# Patient Record
Sex: MALE | Race: WHITE | Employment: OTHER | URBAN - METROPOLITAN AREA
[De-identification: names, ages, dates, MRNs, and addresses within clinical notes are randomized per-mention and may not be internally consistent; named-entity substitution may affect disease eponyms.]

---

## 2019-01-01 ENCOUNTER — HOSPITAL ENCOUNTER (OUTPATIENT)
Dept: PHYSICAL THERAPY | Age: 74
Discharge: HOME | End: 2019-12-17
Attending: PHYSICAL MEDICINE & REHABILITATION
Payer: MEDICARE

## 2019-01-01 ENCOUNTER — APPOINTMENT (OUTPATIENT)
Dept: GENERAL RADIOLOGY | Age: 74
DRG: 057 | End: 2019-01-01
Attending: EMERGENCY MEDICINE
Payer: MEDICARE

## 2019-01-01 ENCOUNTER — HOME CARE VISIT (OUTPATIENT)
Dept: SCHEDULING | Facility: HOME HEALTH | Age: 74
End: 2019-01-01
Payer: MEDICARE

## 2019-01-01 ENCOUNTER — HOME HEALTH ADMISSION (OUTPATIENT)
Dept: HOME HEALTH SERVICES | Facility: HOME HEALTH | Age: 74
End: 2019-01-01
Payer: MEDICARE

## 2019-01-01 ENCOUNTER — APPOINTMENT (OUTPATIENT)
Dept: CT IMAGING | Age: 74
DRG: 057 | End: 2019-01-01
Attending: FAMILY MEDICINE
Payer: MEDICARE

## 2019-01-01 ENCOUNTER — APPOINTMENT (OUTPATIENT)
Dept: GENERAL RADIOLOGY | Age: 74
DRG: 057 | End: 2019-01-01
Attending: FAMILY MEDICINE
Payer: MEDICARE

## 2019-01-01 ENCOUNTER — HOME CARE VISIT (OUTPATIENT)
Dept: HOME HEALTH SERVICES | Facility: HOME HEALTH | Age: 74
End: 2019-01-01
Payer: MEDICARE

## 2019-01-01 ENCOUNTER — HOSPITAL ENCOUNTER (OUTPATIENT)
Dept: PHYSICAL THERAPY | Age: 74
Discharge: HOME | End: 2019-12-06
Attending: PHYSICAL MEDICINE & REHABILITATION
Payer: MEDICARE

## 2019-01-01 ENCOUNTER — HOSPITAL ENCOUNTER (OUTPATIENT)
Dept: PHYSICAL THERAPY | Age: 74
Discharge: HOME | End: 2019-12-12
Attending: PHYSICAL MEDICINE & REHABILITATION
Payer: MEDICARE

## 2019-01-01 ENCOUNTER — HOSPITAL ENCOUNTER (OUTPATIENT)
Dept: LAB | Age: 74
Discharge: HOME | End: 2019-10-24
Payer: MEDICARE

## 2019-01-01 ENCOUNTER — APPOINTMENT (OUTPATIENT)
Dept: CT IMAGING | Age: 74
DRG: 057 | End: 2019-01-01
Attending: EMERGENCY MEDICINE
Payer: MEDICARE

## 2019-01-01 ENCOUNTER — HOSPITAL ENCOUNTER (OUTPATIENT)
Dept: PHYSICAL THERAPY | Age: 74
Discharge: HOME | End: 2019-12-10
Attending: PHYSICAL MEDICINE & REHABILITATION
Payer: MEDICARE

## 2019-01-01 ENCOUNTER — HOSPITAL ENCOUNTER (INPATIENT)
Age: 74
LOS: 2 days | Discharge: NURSING, REHAB OR LONG-TERM CARE | DRG: 057 | End: 2019-09-30
Attending: EMERGENCY MEDICINE | Admitting: FAMILY MEDICINE
Payer: MEDICARE

## 2019-01-01 ENCOUNTER — HOSPITAL ENCOUNTER (OUTPATIENT)
Dept: PHYSICAL THERAPY | Age: 74
Discharge: HOME | End: 2019-12-23
Attending: PHYSICAL MEDICINE & REHABILITATION
Payer: MEDICARE

## 2019-01-01 ENCOUNTER — HOSPITAL ENCOUNTER (OUTPATIENT)
Dept: PHYSICAL THERAPY | Age: 74
Discharge: HOME | End: 2019-12-20
Attending: PHYSICAL MEDICINE & REHABILITATION
Payer: MEDICARE

## 2019-01-01 ENCOUNTER — HOSPITAL ENCOUNTER (INPATIENT)
Age: 74
LOS: 15 days | Discharge: HOME | DRG: 948 | End: 2019-10-15
Attending: PHYSICAL MEDICINE & REHABILITATION | Admitting: PHYSICAL MEDICINE & REHABILITATION
Payer: MEDICARE

## 2019-01-01 ENCOUNTER — APPOINTMENT (OUTPATIENT)
Dept: MRI IMAGING | Age: 74
DRG: 057 | End: 2019-01-01
Attending: FAMILY MEDICINE
Payer: MEDICARE

## 2019-01-01 ENCOUNTER — HOSPITAL ENCOUNTER (OUTPATIENT)
Dept: LAB | Age: 74
Discharge: HOME | End: 2019-11-21
Payer: MEDICARE

## 2019-01-01 VITALS
HEART RATE: 78 BPM | TEMPERATURE: 98.6 F | DIASTOLIC BLOOD PRESSURE: 78 MMHG | RESPIRATION RATE: 16 BRPM | SYSTOLIC BLOOD PRESSURE: 150 MMHG

## 2019-01-01 VITALS
SYSTOLIC BLOOD PRESSURE: 138 MMHG | TEMPERATURE: 98.9 F | RESPIRATION RATE: 18 BRPM | DIASTOLIC BLOOD PRESSURE: 70 MMHG | HEART RATE: 60 BPM | OXYGEN SATURATION: 94 %

## 2019-01-01 VITALS
DIASTOLIC BLOOD PRESSURE: 72 MMHG | RESPIRATION RATE: 18 BRPM | SYSTOLIC BLOOD PRESSURE: 142 MMHG | HEART RATE: 78 BPM | TEMPERATURE: 97.3 F

## 2019-01-01 VITALS
BODY MASS INDEX: 26.3 KG/M2 | RESPIRATION RATE: 18 BRPM | OXYGEN SATURATION: 93 % | HEART RATE: 70 BPM | DIASTOLIC BLOOD PRESSURE: 80 MMHG | HEIGHT: 70 IN | SYSTOLIC BLOOD PRESSURE: 160 MMHG | WEIGHT: 183.7 LBS | TEMPERATURE: 97.7 F

## 2019-01-01 VITALS
RESPIRATION RATE: 14 BRPM | DIASTOLIC BLOOD PRESSURE: 70 MMHG | HEART RATE: 61 BPM | TEMPERATURE: 98.2 F | SYSTOLIC BLOOD PRESSURE: 121 MMHG | OXYGEN SATURATION: 98 %

## 2019-01-01 VITALS
DIASTOLIC BLOOD PRESSURE: 80 MMHG | SYSTOLIC BLOOD PRESSURE: 144 MMHG | RESPIRATION RATE: 17 BRPM | TEMPERATURE: 99 F | HEART RATE: 66 BPM

## 2019-01-01 VITALS
DIASTOLIC BLOOD PRESSURE: 80 MMHG | HEART RATE: 78 BPM | TEMPERATURE: 97.8 F | SYSTOLIC BLOOD PRESSURE: 142 MMHG | RESPIRATION RATE: 18 BRPM

## 2019-01-01 VITALS
DIASTOLIC BLOOD PRESSURE: 85 MMHG | HEART RATE: 76 BPM | SYSTOLIC BLOOD PRESSURE: 140 MMHG | TEMPERATURE: 99 F | OXYGEN SATURATION: 97 %

## 2019-01-01 VITALS
SYSTOLIC BLOOD PRESSURE: 132 MMHG | RESPIRATION RATE: 16 BRPM | TEMPERATURE: 98.3 F | HEART RATE: 78 BPM | DIASTOLIC BLOOD PRESSURE: 74 MMHG

## 2019-01-01 VITALS
TEMPERATURE: 98.5 F | HEART RATE: 60 BPM | RESPIRATION RATE: 16 BRPM | SYSTOLIC BLOOD PRESSURE: 114 MMHG | DIASTOLIC BLOOD PRESSURE: 72 MMHG

## 2019-01-01 VITALS
OXYGEN SATURATION: 96 % | RESPIRATION RATE: 16 BRPM | DIASTOLIC BLOOD PRESSURE: 82 MMHG | TEMPERATURE: 98.4 F | HEART RATE: 82 BPM | SYSTOLIC BLOOD PRESSURE: 113 MMHG

## 2019-01-01 VITALS
SYSTOLIC BLOOD PRESSURE: 146 MMHG | OXYGEN SATURATION: 99 % | RESPIRATION RATE: 20 BRPM | HEART RATE: 74 BPM | DIASTOLIC BLOOD PRESSURE: 64 MMHG

## 2019-01-01 VITALS
RESPIRATION RATE: 16 BRPM | TEMPERATURE: 97.8 F | DIASTOLIC BLOOD PRESSURE: 72 MMHG | SYSTOLIC BLOOD PRESSURE: 128 MMHG | HEART RATE: 78 BPM

## 2019-01-01 VITALS
TEMPERATURE: 98.2 F | HEART RATE: 78 BPM | OXYGEN SATURATION: 96 % | SYSTOLIC BLOOD PRESSURE: 158 MMHG | DIASTOLIC BLOOD PRESSURE: 82 MMHG | RESPIRATION RATE: 18 BRPM

## 2019-01-01 VITALS
HEART RATE: 60 BPM | SYSTOLIC BLOOD PRESSURE: 125 MMHG | TEMPERATURE: 98.6 F | RESPIRATION RATE: 16 BRPM | DIASTOLIC BLOOD PRESSURE: 70 MMHG | OXYGEN SATURATION: 99 %

## 2019-01-01 VITALS
DIASTOLIC BLOOD PRESSURE: 72 MMHG | RESPIRATION RATE: 18 BRPM | HEART RATE: 62 BPM | SYSTOLIC BLOOD PRESSURE: 122 MMHG | TEMPERATURE: 98.2 F

## 2019-01-01 VITALS
HEART RATE: 63 BPM | SYSTOLIC BLOOD PRESSURE: 167 MMHG | RESPIRATION RATE: 16 BRPM | TEMPERATURE: 98.2 F | DIASTOLIC BLOOD PRESSURE: 75 MMHG

## 2019-01-01 VITALS
HEART RATE: 64 BPM | RESPIRATION RATE: 14 BRPM | SYSTOLIC BLOOD PRESSURE: 142 MMHG | TEMPERATURE: 98.1 F | DIASTOLIC BLOOD PRESSURE: 64 MMHG

## 2019-01-01 VITALS
DIASTOLIC BLOOD PRESSURE: 80 MMHG | DIASTOLIC BLOOD PRESSURE: 72 MMHG | TEMPERATURE: 98.2 F | TEMPERATURE: 98.2 F | SYSTOLIC BLOOD PRESSURE: 158 MMHG | RESPIRATION RATE: 18 BRPM | HEART RATE: 78 BPM | RESPIRATION RATE: 18 BRPM | SYSTOLIC BLOOD PRESSURE: 124 MMHG | HEART RATE: 76 BPM

## 2019-01-01 VITALS
TEMPERATURE: 97.8 F | SYSTOLIC BLOOD PRESSURE: 134 MMHG | HEART RATE: 72 BPM | DIASTOLIC BLOOD PRESSURE: 72 MMHG | RESPIRATION RATE: 18 BRPM

## 2019-01-01 VITALS
RESPIRATION RATE: 18 BRPM | HEART RATE: 80 BPM | TEMPERATURE: 97.7 F | DIASTOLIC BLOOD PRESSURE: 80 MMHG | SYSTOLIC BLOOD PRESSURE: 140 MMHG

## 2019-01-01 VITALS
HEART RATE: 77 BPM | SYSTOLIC BLOOD PRESSURE: 110 MMHG | RESPIRATION RATE: 19 BRPM | DIASTOLIC BLOOD PRESSURE: 66 MMHG | TEMPERATURE: 97.9 F

## 2019-01-01 VITALS
TEMPERATURE: 98 F | DIASTOLIC BLOOD PRESSURE: 68 MMHG | SYSTOLIC BLOOD PRESSURE: 156 MMHG | HEART RATE: 64 BPM | RESPIRATION RATE: 16 BRPM

## 2019-01-01 VITALS
HEART RATE: 78 BPM | DIASTOLIC BLOOD PRESSURE: 76 MMHG | RESPIRATION RATE: 18 BRPM | TEMPERATURE: 97.1 F | SYSTOLIC BLOOD PRESSURE: 142 MMHG

## 2019-01-01 VITALS
RESPIRATION RATE: 18 BRPM | DIASTOLIC BLOOD PRESSURE: 72 MMHG | SYSTOLIC BLOOD PRESSURE: 134 MMHG | TEMPERATURE: 97.8 F | HEART RATE: 78 BPM

## 2019-01-01 VITALS
RESPIRATION RATE: 16 BRPM | OXYGEN SATURATION: 98 % | TEMPERATURE: 97.8 F | DIASTOLIC BLOOD PRESSURE: 70 MMHG | SYSTOLIC BLOOD PRESSURE: 150 MMHG | HEART RATE: 62 BPM

## 2019-01-01 VITALS
RESPIRATION RATE: 16 BRPM | TEMPERATURE: 97.9 F | HEART RATE: 80 BPM | DIASTOLIC BLOOD PRESSURE: 84 MMHG | SYSTOLIC BLOOD PRESSURE: 148 MMHG | OXYGEN SATURATION: 98 %

## 2019-01-01 VITALS
HEART RATE: 74 BPM | SYSTOLIC BLOOD PRESSURE: 120 MMHG | RESPIRATION RATE: 18 BRPM | DIASTOLIC BLOOD PRESSURE: 70 MMHG | TEMPERATURE: 98.7 F

## 2019-01-01 VITALS
DIASTOLIC BLOOD PRESSURE: 61 MMHG | HEART RATE: 60 BPM | RESPIRATION RATE: 18 BRPM | TEMPERATURE: 98.6 F | SYSTOLIC BLOOD PRESSURE: 135 MMHG

## 2019-01-01 VITALS
OXYGEN SATURATION: 98 % | SYSTOLIC BLOOD PRESSURE: 150 MMHG | HEART RATE: 64 BPM | TEMPERATURE: 98.7 F | DIASTOLIC BLOOD PRESSURE: 70 MMHG

## 2019-01-01 VITALS
HEART RATE: 62 BPM | TEMPERATURE: 97.8 F | DIASTOLIC BLOOD PRESSURE: 70 MMHG | SYSTOLIC BLOOD PRESSURE: 148 MMHG | RESPIRATION RATE: 16 BRPM

## 2019-01-01 VITALS
RESPIRATION RATE: 18 BRPM | TEMPERATURE: 98.9 F | DIASTOLIC BLOOD PRESSURE: 78 MMHG | HEART RATE: 56 BPM | SYSTOLIC BLOOD PRESSURE: 150 MMHG

## 2019-01-01 VITALS
TEMPERATURE: 97.4 F | RESPIRATION RATE: 18 BRPM | DIASTOLIC BLOOD PRESSURE: 78 MMHG | OXYGEN SATURATION: 98 % | HEART RATE: 78 BPM | SYSTOLIC BLOOD PRESSURE: 138 MMHG

## 2019-01-01 VITALS
RESPIRATION RATE: 18 BRPM | DIASTOLIC BLOOD PRESSURE: 82 MMHG | TEMPERATURE: 98.7 F | HEART RATE: 60 BPM | OXYGEN SATURATION: 98 % | SYSTOLIC BLOOD PRESSURE: 158 MMHG

## 2019-01-01 VITALS
DIASTOLIC BLOOD PRESSURE: 78 MMHG | SYSTOLIC BLOOD PRESSURE: 182 MMHG | TEMPERATURE: 98.2 F | HEART RATE: 78 BPM | RESPIRATION RATE: 18 BRPM

## 2019-01-01 VITALS — TEMPERATURE: 98.2 F | RESPIRATION RATE: 18 BRPM | HEART RATE: 78 BPM

## 2019-01-01 DIAGNOSIS — G93.40 ENCEPHALOPATHY: ICD-10-CM

## 2019-01-01 DIAGNOSIS — R56.9 SEIZURES (HCC): ICD-10-CM

## 2019-01-01 DIAGNOSIS — R53.1 WEAKNESS: Primary | ICD-10-CM

## 2019-01-01 DIAGNOSIS — Z85.46 HISTORY OF PROSTATE CANCER: ICD-10-CM

## 2019-01-01 DIAGNOSIS — R56.9 SEIZURE (HCC): ICD-10-CM

## 2019-01-01 DIAGNOSIS — G45.9 TIA (TRANSIENT ISCHEMIC ATTACK): ICD-10-CM

## 2019-01-01 DIAGNOSIS — I25.10 CORONARY ARTERY DISEASE INVOLVING NATIVE CORONARY ARTERY OF NATIVE HEART, ANGINA PRESENCE UNSPECIFIED: ICD-10-CM

## 2019-01-01 DIAGNOSIS — G40.901 STATUS EPILEPTICUS (HCC): ICD-10-CM

## 2019-01-01 DIAGNOSIS — G40.909 SEIZURE DISORDER (HCC): ICD-10-CM

## 2019-01-01 DIAGNOSIS — Z86.73 HISTORY OF CVA (CEREBROVASCULAR ACCIDENT): ICD-10-CM

## 2019-01-01 DIAGNOSIS — Z90.79 HISTORY OF PROSTATECTOMY: ICD-10-CM

## 2019-01-01 LAB
ALBUMIN SERPL-MCNC: 3.6 G/DL (ref 3.2–4.6)
ALBUMIN/GLOB SERPL: 0.9 {RATIO} (ref 1.2–3.5)
ALP SERPL-CCNC: 126 U/L (ref 50–136)
ALT SERPL-CCNC: 16 U/L (ref 12–65)
ANION GAP SERPL CALC-SCNC: 1 MMOL/L (ref 7–16)
ANION GAP SERPL CALC-SCNC: 4 MMOL/L (ref 7–16)
ANION GAP SERPL CALC-SCNC: 6 MMOL/L (ref 7–16)
ANION GAP SERPL CALC-SCNC: 7 MMOL/L (ref 7–16)
ANION GAP SERPL CALC-SCNC: 8 MMOL/L (ref 7–16)
APPEARANCE UR: ABNORMAL
AST SERPL-CCNC: 19 U/L (ref 15–37)
BACTERIA SPEC CULT: ABNORMAL
BACTERIA SPEC CULT: ABNORMAL
BACTERIA SPEC CULT: NORMAL
BACTERIA SPEC CULT: NORMAL
BACTERIA URNS QL MICRO: 0 /HPF
BASOPHILS # BLD: 0 K/UL (ref 0–0.2)
BASOPHILS # BLD: 0 K/UL (ref 0–0.2)
BASOPHILS NFR BLD: 0 % (ref 0–2)
BASOPHILS NFR BLD: 0 % (ref 0–2)
BILIRUB SERPL-MCNC: 0.5 MG/DL (ref 0.2–1.1)
BILIRUB UR QL: NEGATIVE
BUN SERPL-MCNC: 16 MG/DL (ref 8–23)
BUN SERPL-MCNC: 17 MG/DL (ref 8–23)
BUN SERPL-MCNC: 19 MG/DL (ref 8–23)
BUN SERPL-MCNC: 20 MG/DL (ref 8–23)
BUN SERPL-MCNC: 23 MG/DL (ref 8–23)
CALCIUM SERPL-MCNC: 9 MG/DL (ref 8.3–10.4)
CALCIUM SERPL-MCNC: 9.1 MG/DL (ref 8.3–10.4)
CALCIUM SERPL-MCNC: 9.2 MG/DL (ref 8.3–10.4)
CALCIUM SERPL-MCNC: 9.2 MG/DL (ref 8.3–10.4)
CALCIUM SERPL-MCNC: 9.3 MG/DL (ref 8.3–10.4)
CALCIUM SERPL-MCNC: 9.5 MG/DL (ref 8.3–10.4)
CALCIUM SERPL-MCNC: 9.5 MG/DL (ref 8.3–10.4)
CASTS URNS QL MICRO: 0 /LPF
CHLORIDE SERPL-SCNC: 103 MMOL/L (ref 98–107)
CHLORIDE SERPL-SCNC: 103 MMOL/L (ref 98–107)
CHLORIDE SERPL-SCNC: 104 MMOL/L (ref 98–107)
CHLORIDE SERPL-SCNC: 106 MMOL/L (ref 98–107)
CHLORIDE SERPL-SCNC: 107 MMOL/L (ref 98–107)
CHLORIDE SERPL-SCNC: 108 MMOL/L (ref 98–107)
CHLORIDE SERPL-SCNC: 111 MMOL/L (ref 98–107)
CHOLEST SERPL-MCNC: 152 MG/DL
CO2 SERPL-SCNC: 30 MMOL/L (ref 21–32)
CO2 SERPL-SCNC: 30 MMOL/L (ref 21–32)
CO2 SERPL-SCNC: 31 MMOL/L (ref 21–32)
CO2 SERPL-SCNC: 31 MMOL/L (ref 21–32)
CO2 SERPL-SCNC: 32 MMOL/L (ref 21–32)
CO2 SERPL-SCNC: 33 MMOL/L (ref 21–32)
CO2 SERPL-SCNC: 33 MMOL/L (ref 21–32)
COLOR UR: YELLOW
CREAT SERPL-MCNC: 0.9 MG/DL (ref 0.8–1.5)
CREAT SERPL-MCNC: 0.91 MG/DL (ref 0.8–1.5)
CREAT SERPL-MCNC: 0.92 MG/DL (ref 0.8–1.5)
CREAT SERPL-MCNC: 0.92 MG/DL (ref 0.8–1.5)
CREAT SERPL-MCNC: 0.93 MG/DL (ref 0.8–1.5)
CREAT SERPL-MCNC: 0.99 MG/DL (ref 0.8–1.5)
CREAT SERPL-MCNC: 1.11 MG/DL (ref 0.8–1.5)
DIFFERENTIAL METHOD BLD: ABNORMAL
DIFFERENTIAL METHOD BLD: ABNORMAL
EOSINOPHIL # BLD: 0 K/UL (ref 0–0.8)
EOSINOPHIL # BLD: 0.1 K/UL (ref 0–0.8)
EOSINOPHIL NFR BLD: 0 % (ref 0.5–7.8)
EOSINOPHIL NFR BLD: 1 % (ref 0.5–7.8)
EPI CELLS #/AREA URNS HPF: ABNORMAL /HPF
ERYTHROCYTE [DISTWIDTH] IN BLOOD BY AUTOMATED COUNT: 14.6 % (ref 11.9–14.6)
ERYTHROCYTE [DISTWIDTH] IN BLOOD BY AUTOMATED COUNT: 14.7 % (ref 11.9–14.6)
ERYTHROCYTE [DISTWIDTH] IN BLOOD BY AUTOMATED COUNT: 14.9 % (ref 11.9–14.6)
ERYTHROCYTE [DISTWIDTH] IN BLOOD BY AUTOMATED COUNT: 15.1 % (ref 11.9–14.6)
ERYTHROCYTE [DISTWIDTH] IN BLOOD BY AUTOMATED COUNT: 15.1 % (ref 11.9–14.6)
EST. AVERAGE GLUCOSE BLD GHB EST-MCNC: 111 MG/DL
GLOBULIN SER CALC-MCNC: 4.1 G/DL (ref 2.3–3.5)
GLUCOSE BLD STRIP.AUTO-MCNC: 88 MG/DL (ref 65–100)
GLUCOSE SERPL-MCNC: 101 MG/DL (ref 65–100)
GLUCOSE SERPL-MCNC: 106 MG/DL (ref 65–100)
GLUCOSE SERPL-MCNC: 111 MG/DL (ref 65–100)
GLUCOSE SERPL-MCNC: 90 MG/DL (ref 65–100)
GLUCOSE SERPL-MCNC: 92 MG/DL (ref 65–100)
GLUCOSE SERPL-MCNC: 93 MG/DL (ref 65–100)
GLUCOSE SERPL-MCNC: 95 MG/DL (ref 65–100)
GLUCOSE UR STRIP.AUTO-MCNC: NEGATIVE MG/DL
HBA1C MFR BLD: 5.5 %
HCT VFR BLD AUTO: 37.1 % (ref 41.1–50.3)
HCT VFR BLD AUTO: 37.2 % (ref 41.1–50.3)
HCT VFR BLD AUTO: 37.7 % (ref 41.1–50.3)
HCT VFR BLD AUTO: 38.8 % (ref 41.1–50.3)
HCT VFR BLD AUTO: 39.4 % (ref 41.1–50.3)
HCT VFR BLD AUTO: 40.3 % (ref 41.1–50.3)
HCT VFR BLD AUTO: 40.7 % (ref 41.1–50.3)
HCT VFR BLD AUTO: 45.5 % (ref 41.1–50.3)
HDLC SERPL-MCNC: 44 MG/DL (ref 40–60)
HDLC SERPL: 3.5 {RATIO}
HGB BLD-MCNC: 11.3 G/DL (ref 13.6–17.2)
HGB BLD-MCNC: 11.7 G/DL (ref 13.6–17.2)
HGB BLD-MCNC: 11.8 G/DL (ref 13.6–17.2)
HGB BLD-MCNC: 12.3 G/DL (ref 13.6–17.2)
HGB BLD-MCNC: 12.4 G/DL (ref 13.6–17.2)
HGB BLD-MCNC: 12.6 G/DL (ref 13.6–17.2)
HGB BLD-MCNC: 12.7 G/DL (ref 13.6–17.2)
HGB BLD-MCNC: 14.2 G/DL (ref 13.6–17.2)
HGB UR QL STRIP: NEGATIVE
IMM GRANULOCYTES # BLD AUTO: 0 K/UL (ref 0–0.5)
IMM GRANULOCYTES # BLD AUTO: 0 K/UL (ref 0–0.5)
IMM GRANULOCYTES NFR BLD AUTO: 0 % (ref 0–5)
IMM GRANULOCYTES NFR BLD AUTO: 1 % (ref 0–5)
INR BLD: 1.2 (ref 0.9–1.2)
KETONES UR QL STRIP.AUTO: NEGATIVE MG/DL
LACTATE BLD-SCNC: 0.72 MMOL/L (ref 0.5–1.9)
LDLC SERPL CALC-MCNC: 91.2 MG/DL
LEUKOCYTE ESTERASE UR QL STRIP.AUTO: ABNORMAL
LIPID PROFILE,FLP: NORMAL
LYMPHOCYTES # BLD: 0.5 K/UL (ref 0.5–4.6)
LYMPHOCYTES # BLD: 0.6 K/UL (ref 0.5–4.6)
LYMPHOCYTES NFR BLD: 10 % (ref 13–44)
LYMPHOCYTES NFR BLD: 7 % (ref 13–44)
MAGNESIUM SERPL-MCNC: 2.1 MG/DL (ref 1.8–2.4)
MAGNESIUM SERPL-MCNC: 2.3 MG/DL (ref 1.8–2.4)
MAGNESIUM SERPL-MCNC: 2.4 MG/DL (ref 1.8–2.4)
MCH RBC QN AUTO: 26 PG (ref 26.1–32.9)
MCH RBC QN AUTO: 26.4 PG (ref 26.1–32.9)
MCH RBC QN AUTO: 26.5 PG (ref 26.1–32.9)
MCH RBC QN AUTO: 26.7 PG (ref 26.1–32.9)
MCH RBC QN AUTO: 26.8 PG (ref 26.1–32.9)
MCH RBC QN AUTO: 26.9 PG (ref 26.1–32.9)
MCH RBC QN AUTO: 27.3 PG (ref 26.1–32.9)
MCH RBC QN AUTO: 27.8 PG (ref 26.1–32.9)
MCHC RBC AUTO-ENTMCNC: 30.5 G/DL (ref 31.4–35)
MCHC RBC AUTO-ENTMCNC: 31.2 G/DL (ref 31.4–35)
MCHC RBC AUTO-ENTMCNC: 31.2 G/DL (ref 31.4–35)
MCHC RBC AUTO-ENTMCNC: 31.3 G/DL (ref 31.4–35)
MCHC RBC AUTO-ENTMCNC: 31.3 G/DL (ref 31.4–35)
MCHC RBC AUTO-ENTMCNC: 31.5 G/DL (ref 31.4–35)
MCHC RBC AUTO-ENTMCNC: 31.5 G/DL (ref 31.4–35)
MCHC RBC AUTO-ENTMCNC: 31.7 G/DL (ref 31.4–35)
MCV RBC AUTO: 83.4 FL (ref 79.6–97.8)
MCV RBC AUTO: 83.8 FL (ref 79.6–97.8)
MCV RBC AUTO: 84.7 FL (ref 79.6–97.8)
MCV RBC AUTO: 85.5 FL (ref 79.6–97.8)
MCV RBC AUTO: 85.6 FL (ref 79.6–97.8)
MCV RBC AUTO: 85.9 FL (ref 79.6–97.8)
MCV RBC AUTO: 87.5 FL (ref 79.6–97.8)
MCV RBC AUTO: 89 FL (ref 79.6–97.8)
MM INDURATION POC: 0 MM (ref 0–5)
MONOCYTES # BLD: 0.3 K/UL (ref 0.1–1.3)
MONOCYTES # BLD: 0.5 K/UL (ref 0.1–1.3)
MONOCYTES NFR BLD: 5 % (ref 4–12)
MONOCYTES NFR BLD: 7 % (ref 4–12)
NEUTS SEG # BLD: 5.2 K/UL (ref 1.7–8.2)
NEUTS SEG # BLD: 5.5 K/UL (ref 1.7–8.2)
NEUTS SEG NFR BLD: 81 % (ref 43–78)
NEUTS SEG NFR BLD: 88 % (ref 43–78)
NITRITE UR QL STRIP.AUTO: NEGATIVE
NRBC # BLD: 0 K/UL (ref 0–0.2)
PH UR STRIP: 6 [PH] (ref 5–9)
PHENYTOIN FREE SERPL-MCNC: ABNORMAL UG/ML (ref 1–2)
PHENYTOIN SERPL-MCNC: 5.3 UG/ML (ref 10–20)
PHENYTOIN SERPL-MCNC: 5.9 UG/ML (ref 10–20)
PHENYTOIN SERPL-MCNC: 6.9 UG/ML (ref 10–20)
PLATELET # BLD AUTO: 153 K/UL (ref 150–450)
PLATELET # BLD AUTO: 156 K/UL (ref 150–450)
PLATELET # BLD AUTO: 168 K/UL (ref 150–450)
PLATELET # BLD AUTO: 177 K/UL (ref 150–450)
PLATELET # BLD AUTO: 185 K/UL (ref 150–450)
PLATELET # BLD AUTO: 190 K/UL (ref 150–450)
PLATELET # BLD AUTO: 193 K/UL (ref 150–450)
PLATELET # BLD AUTO: 308 K/UL (ref 150–450)
PMV BLD AUTO: 9 FL (ref 9.4–12.3)
PMV BLD AUTO: 9.3 FL (ref 9.4–12.3)
PMV BLD AUTO: 9.5 FL (ref 9.4–12.3)
POTASSIUM SERPL-SCNC: 3.3 MMOL/L (ref 3.5–5.1)
POTASSIUM SERPL-SCNC: 3.4 MMOL/L (ref 3.5–5.1)
POTASSIUM SERPL-SCNC: 3.6 MMOL/L (ref 3.5–5.1)
POTASSIUM SERPL-SCNC: 3.6 MMOL/L (ref 3.5–5.1)
POTASSIUM SERPL-SCNC: 3.7 MMOL/L (ref 3.5–5.1)
POTASSIUM SERPL-SCNC: 3.8 MMOL/L (ref 3.5–5.1)
POTASSIUM SERPL-SCNC: 4 MMOL/L (ref 3.5–5.1)
PPD POC: NEGATIVE NEGATIVE
PROT SERPL-MCNC: 7.7 G/DL (ref 6.3–8.2)
PROT UR STRIP-MCNC: NEGATIVE MG/DL
PT BLD: 13.9 SECS (ref 9.6–11.6)
RBC # BLD AUTO: 4.34 M/UL (ref 4.23–5.6)
RBC # BLD AUTO: 4.39 M/UL (ref 4.23–5.6)
RBC # BLD AUTO: 4.39 M/UL (ref 4.23–5.6)
RBC # BLD AUTO: 4.65 M/UL (ref 4.23–5.6)
RBC # BLD AUTO: 4.65 M/UL (ref 4.23–5.6)
RBC # BLD AUTO: 4.7 M/UL (ref 4.23–5.6)
RBC # BLD AUTO: 4.71 M/UL (ref 4.23–5.6)
RBC # BLD AUTO: 5.11 M/UL (ref 4.23–5.6)
RBC #/AREA URNS HPF: ABNORMAL /HPF
SERVICE CMNT-IMP: ABNORMAL
SERVICE CMNT-IMP: NORMAL
SERVICE CMNT-IMP: NORMAL
SODIUM SERPL-SCNC: 140 MMOL/L (ref 136–145)
SODIUM SERPL-SCNC: 140 MMOL/L (ref 136–145)
SODIUM SERPL-SCNC: 141 MMOL/L (ref 136–145)
SODIUM SERPL-SCNC: 142 MMOL/L (ref 136–145)
SODIUM SERPL-SCNC: 143 MMOL/L (ref 136–145)
SODIUM SERPL-SCNC: 146 MMOL/L (ref 136–145)
SODIUM SERPL-SCNC: 148 MMOL/L (ref 136–145)
SP GR UR REFRACTOMETRY: 1.01 (ref 1–1.02)
TRIGL SERPL-MCNC: 84 MG/DL (ref 35–150)
TSH SERPL DL<=0.005 MIU/L-ACNC: 0.41 UIU/ML
UROBILINOGEN UR QL STRIP.AUTO: 0.2 EU/DL (ref 0.2–1)
VLDLC SERPL CALC-MCNC: 16.8 MG/DL (ref 6–23)
WBC # BLD AUTO: 4.4 K/UL (ref 4.3–11.1)
WBC # BLD AUTO: 5.2 K/UL (ref 4.3–11.1)
WBC # BLD AUTO: 5.4 K/UL (ref 4.3–11.1)
WBC # BLD AUTO: 5.8 K/UL (ref 4.3–11.1)
WBC # BLD AUTO: 6.2 K/UL (ref 4.3–11.1)
WBC # BLD AUTO: 6.3 K/UL (ref 4.3–11.1)
WBC # BLD AUTO: 6.4 K/UL (ref 4.3–11.1)
WBC # BLD AUTO: 7.2 K/UL (ref 4.3–11.1)
WBC URNS QL MICRO: ABNORMAL /HPF

## 2019-01-01 PROCEDURE — 3331090002 HH PPS REVENUE DEBIT

## 2019-01-01 PROCEDURE — 97535 SELF CARE MNGMENT TRAINING: CPT

## 2019-01-01 PROCEDURE — 3331090001 HH PPS REVENUE CREDIT

## 2019-01-01 PROCEDURE — 92507 TX SP LANG VOICE COMM INDIV: CPT

## 2019-01-01 PROCEDURE — 65310000000 HC RM PRIVATE REHAB

## 2019-01-01 PROCEDURE — 97530 THERAPEUTIC ACTIVITIES: CPT

## 2019-01-01 PROCEDURE — 97116 GAIT TRAINING THERAPY: CPT

## 2019-01-01 PROCEDURE — 80048 BASIC METABOLIC PNL TOTAL CA: CPT

## 2019-01-01 PROCEDURE — G0158 HHC OT ASSISTANT EA 15: HCPCS

## 2019-01-01 PROCEDURE — 74011250637 HC RX REV CODE- 250/637: Performed by: FAMILY MEDICINE

## 2019-01-01 PROCEDURE — 74011000258 HC RX REV CODE- 258: Performed by: FAMILY MEDICINE

## 2019-01-01 PROCEDURE — 97166 OT EVAL MOD COMPLEX 45 MIN: CPT

## 2019-01-01 PROCEDURE — 74011250636 HC RX REV CODE- 250/636: Performed by: PHYSICAL MEDICINE & REHABILITATION

## 2019-01-01 PROCEDURE — 97110 THERAPEUTIC EXERCISES: CPT

## 2019-01-01 PROCEDURE — 74011250637 HC RX REV CODE- 250/637: Performed by: PHYSICAL MEDICINE & REHABILITATION

## 2019-01-01 PROCEDURE — 99232 SBSQ HOSP IP/OBS MODERATE 35: CPT | Performed by: PHYSICAL MEDICINE & REHABILITATION

## 2019-01-01 PROCEDURE — 85610 PROTHROMBIN TIME: CPT

## 2019-01-01 PROCEDURE — G0153 HHCP-SVS OF S/L PATH,EA 15MN: HCPCS

## 2019-01-01 PROCEDURE — 74011250636 HC RX REV CODE- 250/636

## 2019-01-01 PROCEDURE — G0151 HHCP-SERV OF PT,EA 15 MIN: HCPCS

## 2019-01-01 PROCEDURE — 97150 GROUP THERAPEUTIC PROCEDURES: CPT

## 2019-01-01 PROCEDURE — 70450 CT HEAD/BRAIN W/O DYE: CPT

## 2019-01-01 PROCEDURE — 99218 HC RM OBSERVATION: CPT

## 2019-01-01 PROCEDURE — 85025 COMPLETE CBC W/AUTO DIFF WBC: CPT

## 2019-01-01 PROCEDURE — G0299 HHS/HOSPICE OF RN EA 15 MIN: HCPCS

## 2019-01-01 PROCEDURE — 85027 COMPLETE CBC AUTOMATED: CPT

## 2019-01-01 PROCEDURE — G0157 HHC PT ASSISTANT EA 15: HCPCS

## 2019-01-01 PROCEDURE — 87040 BLOOD CULTURE FOR BACTERIA: CPT

## 2019-01-01 PROCEDURE — 80053 COMPREHEN METABOLIC PANEL: CPT

## 2019-01-01 PROCEDURE — 73502 X-RAY EXAM HIP UNI 2-3 VIEWS: CPT

## 2019-01-01 PROCEDURE — 74011250636 HC RX REV CODE- 250/636: Performed by: FAMILY MEDICINE

## 2019-01-01 PROCEDURE — 74011000258 HC RX REV CODE- 258: Performed by: PSYCHIATRY & NEUROLOGY

## 2019-01-01 PROCEDURE — 96152 PR HEAL & BEHAV INTERVENT,EA 15 MIN,INDIV: CPT | Performed by: PSYCHOLOGIST

## 2019-01-01 PROCEDURE — 36415 COLL VENOUS BLD VENIPUNCTURE: CPT

## 2019-01-01 PROCEDURE — 97162 PT EVAL MOD COMPLEX 30 MIN: CPT

## 2019-01-01 PROCEDURE — 82962 GLUCOSE BLOOD TEST: CPT

## 2019-01-01 PROCEDURE — A9270 NON-COVERED ITEM OR SERVICE: HCPCS

## 2019-01-01 PROCEDURE — 83735 ASSAY OF MAGNESIUM: CPT

## 2019-01-01 PROCEDURE — 99285 EMERGENCY DEPT VISIT HI MDM: CPT | Performed by: EMERGENCY MEDICINE

## 2019-01-01 PROCEDURE — 84443 ASSAY THYROID STIM HORMONE: CPT

## 2019-01-01 PROCEDURE — 77030020263 HC SOL INJ SOD CL0.9% LFCR 1000ML

## 2019-01-01 PROCEDURE — 74011636320 HC RX REV CODE- 636/320: Performed by: FAMILY MEDICINE

## 2019-01-01 PROCEDURE — 87186 SC STD MICRODIL/AGAR DIL: CPT

## 2019-01-01 PROCEDURE — 80185 ASSAY OF PHENYTOIN TOTAL: CPT

## 2019-01-01 PROCEDURE — 96374 THER/PROPH/DIAG INJ IV PUSH: CPT | Performed by: EMERGENCY MEDICINE

## 2019-01-01 PROCEDURE — C8929 TTE W OR WO FOL WCON,DOPPLER: HCPCS

## 2019-01-01 PROCEDURE — 99223 1ST HOSP IP/OBS HIGH 75: CPT | Performed by: PHYSICAL MEDICINE & REHABILITATION

## 2019-01-01 PROCEDURE — 99239 HOSP IP/OBS DSCHRG MGMT >30: CPT | Performed by: PHYSICAL MEDICINE & REHABILITATION

## 2019-01-01 PROCEDURE — 86580 TB INTRADERMAL TEST: CPT | Performed by: FAMILY MEDICINE

## 2019-01-01 PROCEDURE — 83605 ASSAY OF LACTIC ACID: CPT

## 2019-01-01 PROCEDURE — 99203 OFFICE O/P NEW LOW 30 MIN: CPT | Performed by: PSYCHIATRY & NEUROLOGY

## 2019-01-01 PROCEDURE — 83036 HEMOGLOBIN GLYCOSYLATED A1C: CPT

## 2019-01-01 PROCEDURE — 74011250637 HC RX REV CODE- 250/637: Performed by: PSYCHIATRY & NEUROLOGY

## 2019-01-01 PROCEDURE — 87086 URINE CULTURE/COLONY COUNT: CPT

## 2019-01-01 PROCEDURE — 74011000258 HC RX REV CODE- 258: Performed by: EMERGENCY MEDICINE

## 2019-01-01 PROCEDURE — 71045 X-RAY EXAM CHEST 1 VIEW: CPT

## 2019-01-01 PROCEDURE — 95812 EEG 41-60 MINUTES: CPT | Performed by: PSYCHIATRY & NEUROLOGY

## 2019-01-01 PROCEDURE — 99215 OFFICE O/P EST HI 40 MIN: CPT | Performed by: PHYSICAL MEDICINE & REHABILITATION

## 2019-01-01 PROCEDURE — 80061 LIPID PANEL: CPT

## 2019-01-01 PROCEDURE — 65270000029 HC RM PRIVATE

## 2019-01-01 PROCEDURE — 92610 EVALUATE SWALLOWING FUNCTION: CPT

## 2019-01-01 PROCEDURE — 70496 CT ANGIOGRAPHY HEAD: CPT

## 2019-01-01 PROCEDURE — 74011000302 HC RX REV CODE- 302: Performed by: FAMILY MEDICINE

## 2019-01-01 PROCEDURE — 94760 N-INVAS EAR/PLS OXIMETRY 1: CPT

## 2019-01-01 PROCEDURE — 97165 OT EVAL LOW COMPLEX 30 MIN: CPT

## 2019-01-01 PROCEDURE — 90837 PSYTX W PT 60 MINUTES: CPT | Performed by: PSYCHOLOGIST

## 2019-01-01 PROCEDURE — 74011250636 HC RX REV CODE- 250/636: Performed by: EMERGENCY MEDICINE

## 2019-01-01 PROCEDURE — 77030019605

## 2019-01-01 PROCEDURE — G0152 HHCP-SERV OF OT,EA 15 MIN: HCPCS

## 2019-01-01 PROCEDURE — E0325 URINAL MALE JUG-TYPE: HCPCS

## 2019-01-01 PROCEDURE — 70551 MRI BRAIN STEM W/O DYE: CPT

## 2019-01-01 PROCEDURE — G0155 HHCP-SVS OF CSW,EA 15 MIN: HCPCS

## 2019-01-01 PROCEDURE — 74011250636 HC RX REV CODE- 250/636: Performed by: PSYCHIATRY & NEUROLOGY

## 2019-01-01 PROCEDURE — 87088 URINE BACTERIA CULTURE: CPT

## 2019-01-01 PROCEDURE — 97161 PT EVAL LOW COMPLEX 20 MIN: CPT

## 2019-01-01 PROCEDURE — 74011000250 HC RX REV CODE- 250: Performed by: FAMILY MEDICINE

## 2019-01-01 PROCEDURE — 81001 URINALYSIS AUTO W/SCOPE: CPT

## 2019-01-01 PROCEDURE — 77010033678 HC OXYGEN DAILY

## 2019-01-01 PROCEDURE — 400013 HH SOC

## 2019-01-01 PROCEDURE — 92523 SPEECH SOUND LANG COMPREHEN: CPT

## 2019-01-01 RX ORDER — PHENYTOIN SODIUM 100 MG/1
200 CAPSULE, EXTENDED RELEASE ORAL 2 TIMES DAILY
Status: DISCONTINUED | OUTPATIENT
Start: 2019-01-01 | End: 2019-01-01 | Stop reason: HOSPADM

## 2019-01-01 RX ORDER — NIFEDIPINE 30 MG/1
60 TABLET, EXTENDED RELEASE ORAL 2 TIMES DAILY
Status: DISCONTINUED | OUTPATIENT
Start: 2019-01-01 | End: 2019-01-01 | Stop reason: HOSPADM

## 2019-01-01 RX ORDER — LEVETIRACETAM 1000 MG/1
1000 TABLET ORAL 2 TIMES DAILY
Qty: 60 TAB | Refills: 2 | Status: SHIPPED | OUTPATIENT
Start: 2019-01-01 | End: 2019-01-01 | Stop reason: DRUGHIGH

## 2019-01-01 RX ORDER — NIFEDIPINE 60 MG/1
60 TABLET, EXTENDED RELEASE ORAL 2 TIMES DAILY
COMMUNITY
End: 2020-01-01 | Stop reason: SDUPTHER

## 2019-01-01 RX ORDER — ATORVASTATIN CALCIUM 40 MG/1
40 TABLET, FILM COATED ORAL
Qty: 30 TAB | Refills: 2 | Status: SHIPPED | OUTPATIENT
Start: 2019-01-01 | End: 2020-01-01 | Stop reason: SDUPTHER

## 2019-01-01 RX ORDER — SODIUM CHLORIDE 0.9 % (FLUSH) 0.9 %
5-40 SYRINGE (ML) INJECTION AS NEEDED
Status: DISCONTINUED | OUTPATIENT
Start: 2019-01-01 | End: 2019-01-01 | Stop reason: HOSPADM

## 2019-01-01 RX ORDER — GUAIFENESIN 100 MG/5ML
81 LIQUID (ML) ORAL DAILY
Status: DISCONTINUED | OUTPATIENT
Start: 2019-01-01 | End: 2019-01-01 | Stop reason: HOSPADM

## 2019-01-01 RX ORDER — LORAZEPAM 0.5 MG/1
0.5 TABLET ORAL
Status: DISCONTINUED | OUTPATIENT
Start: 2019-01-01 | End: 2019-01-01 | Stop reason: HOSPADM

## 2019-01-01 RX ORDER — DONEPEZIL HYDROCHLORIDE 5 MG/1
5 TABLET, FILM COATED ORAL DAILY
Status: DISCONTINUED | OUTPATIENT
Start: 2019-01-01 | End: 2019-01-01 | Stop reason: HOSPADM

## 2019-01-01 RX ORDER — ACETAMINOPHEN 325 MG/1
650 TABLET ORAL
Status: DISCONTINUED | OUTPATIENT
Start: 2019-01-01 | End: 2019-01-01 | Stop reason: HOSPADM

## 2019-01-01 RX ORDER — ATORVASTATIN CALCIUM 40 MG/1
40 TABLET, FILM COATED ORAL
Status: DISCONTINUED | OUTPATIENT
Start: 2019-01-01 | End: 2019-01-01 | Stop reason: HOSPADM

## 2019-01-01 RX ORDER — HYDROCHLOROTHIAZIDE 25 MG/1
25 TABLET ORAL DAILY
Status: DISCONTINUED | OUTPATIENT
Start: 2019-01-01 | End: 2019-01-01 | Stop reason: HOSPADM

## 2019-01-01 RX ORDER — BISACODYL 5 MG
5 TABLET, DELAYED RELEASE (ENTERIC COATED) ORAL DAILY PRN
Status: CANCELLED | OUTPATIENT
Start: 2019-01-01

## 2019-01-01 RX ORDER — ATORVASTATIN CALCIUM 20 MG/1
20 TABLET, FILM COATED ORAL DAILY
COMMUNITY
End: 2019-01-01 | Stop reason: SDUPTHER

## 2019-01-01 RX ORDER — TRAZODONE HYDROCHLORIDE 50 MG/1
25 TABLET ORAL
Status: DISCONTINUED | OUTPATIENT
Start: 2019-01-01 | End: 2019-01-01 | Stop reason: HOSPADM

## 2019-01-01 RX ORDER — LABETALOL HYDROCHLORIDE 5 MG/ML
5 INJECTION, SOLUTION INTRAVENOUS
Status: DISCONTINUED | OUTPATIENT
Start: 2019-01-01 | End: 2019-01-01 | Stop reason: HOSPADM

## 2019-01-01 RX ORDER — LABETALOL HYDROCHLORIDE 5 MG/ML
5 INJECTION, SOLUTION INTRAVENOUS
Status: CANCELLED | OUTPATIENT
Start: 2019-01-01

## 2019-01-01 RX ORDER — PHENYTOIN SODIUM 100 MG/1
200 CAPSULE, EXTENDED RELEASE ORAL 2 TIMES DAILY
Status: CANCELLED | OUTPATIENT
Start: 2019-01-01

## 2019-01-01 RX ORDER — ACETAMINOPHEN 325 MG/1
650 TABLET ORAL
Status: CANCELLED | OUTPATIENT
Start: 2019-01-01

## 2019-01-01 RX ORDER — LEVETIRACETAM 500 MG/1
1000 TABLET ORAL 2 TIMES DAILY
Status: DISCONTINUED | OUTPATIENT
Start: 2019-01-01 | End: 2019-01-01 | Stop reason: HOSPADM

## 2019-01-01 RX ORDER — PAROXETINE HYDROCHLORIDE HEMIHYDRATE 25 MG/1
25 TABLET, FILM COATED, EXTENDED RELEASE ORAL DAILY
COMMUNITY
End: 2019-01-01 | Stop reason: ALTCHOICE

## 2019-01-01 RX ORDER — HEPARIN SODIUM 5000 [USP'U]/ML
5000 INJECTION, SOLUTION INTRAVENOUS; SUBCUTANEOUS EVERY 8 HOURS
Status: CANCELLED | OUTPATIENT
Start: 2019-01-01

## 2019-01-01 RX ORDER — POTASSIUM CHLORIDE 20 MEQ/1
20 TABLET, EXTENDED RELEASE ORAL DAILY
Status: DISCONTINUED | OUTPATIENT
Start: 2019-01-01 | End: 2019-01-01

## 2019-01-01 RX ORDER — ATORVASTATIN CALCIUM 20 MG/1
40 TABLET, FILM COATED ORAL
Status: DISCONTINUED | OUTPATIENT
Start: 2019-01-01 | End: 2019-01-01 | Stop reason: HOSPADM

## 2019-01-01 RX ORDER — POTASSIUM CHLORIDE 20 MEQ/1
40 TABLET, EXTENDED RELEASE ORAL
Status: COMPLETED | OUTPATIENT
Start: 2019-01-01 | End: 2019-01-01

## 2019-01-01 RX ORDER — ESOMEPRAZOLE MAGNESIUM 40 MG/1
40 CAPSULE, DELAYED RELEASE ORAL AS NEEDED
COMMUNITY
End: 2020-01-01

## 2019-01-01 RX ORDER — LANOLIN ALCOHOL/MO/W.PET/CERES
400 CREAM (GRAM) TOPICAL DAILY
Status: DISCONTINUED | OUTPATIENT
Start: 2019-01-01 | End: 2019-01-01 | Stop reason: HOSPADM

## 2019-01-01 RX ORDER — LANOLIN ALCOHOL/MO/W.PET/CERES
400 CREAM (GRAM) TOPICAL 3 TIMES DAILY
COMMUNITY
End: 2019-01-01

## 2019-01-01 RX ORDER — POTASSIUM CHLORIDE 750 MG/1
10 TABLET, FILM COATED, EXTENDED RELEASE ORAL DAILY
Qty: 30 TAB | Refills: 1 | Status: SHIPPED | OUTPATIENT
Start: 2019-01-01 | End: 2020-01-01

## 2019-01-01 RX ORDER — LORAZEPAM 2 MG/ML
1 INJECTION INTRAMUSCULAR AS NEEDED
Status: CANCELLED | OUTPATIENT
Start: 2019-01-01

## 2019-01-01 RX ORDER — PAROXETINE HYDROCHLORIDE HEMIHYDRATE 25 MG/1
25 TABLET, FILM COATED, EXTENDED RELEASE ORAL DAILY
Status: DISCONTINUED | OUTPATIENT
Start: 2019-01-01 | End: 2019-01-01 | Stop reason: HOSPADM

## 2019-01-01 RX ORDER — LORAZEPAM 2 MG/ML
1 INJECTION INTRAMUSCULAR AS NEEDED
Status: DISCONTINUED | OUTPATIENT
Start: 2019-01-01 | End: 2019-01-01

## 2019-01-01 RX ORDER — DONEPEZIL HYDROCHLORIDE 5 MG/1
5 TABLET, FILM COATED ORAL DAILY
Status: CANCELLED | OUTPATIENT
Start: 2019-01-01

## 2019-01-01 RX ORDER — LORAZEPAM 2 MG/ML
INJECTION INTRAMUSCULAR
Status: COMPLETED
Start: 2019-01-01 | End: 2019-01-01

## 2019-01-01 RX ORDER — POTASSIUM CHLORIDE 750 MG/1
20 TABLET, FILM COATED, EXTENDED RELEASE ORAL DAILY
Status: ON HOLD | COMMUNITY
End: 2019-01-01 | Stop reason: SDUPTHER

## 2019-01-01 RX ORDER — DONEPEZIL HYDROCHLORIDE 5 MG/1
5 TABLET, FILM COATED ORAL
Status: DISCONTINUED | OUTPATIENT
Start: 2019-01-01 | End: 2019-01-01 | Stop reason: SDUPTHER

## 2019-01-01 RX ORDER — PHENYTOIN SODIUM 200 MG/1
200 CAPSULE, EXTENDED RELEASE ORAL 2 TIMES DAILY
Qty: 60 CAP | Refills: 2 | Status: SHIPPED | OUTPATIENT
Start: 2019-01-01 | End: 2020-01-01 | Stop reason: SDUPTHER

## 2019-01-01 RX ORDER — GUAIFENESIN 100 MG/5ML
81 LIQUID (ML) ORAL DAILY
Qty: 30 TAB | Refills: 1 | Status: SHIPPED | OUTPATIENT
Start: 2019-01-01 | End: 2020-01-01

## 2019-01-01 RX ORDER — HEPARIN SODIUM 5000 [USP'U]/ML
5000 INJECTION, SOLUTION INTRAVENOUS; SUBCUTANEOUS EVERY 8 HOURS
Status: DISCONTINUED | OUTPATIENT
Start: 2019-01-01 | End: 2019-01-01 | Stop reason: HOSPADM

## 2019-01-01 RX ORDER — SODIUM CHLORIDE 0.9 % (FLUSH) 0.9 %
10 SYRINGE (ML) INJECTION
Status: COMPLETED | OUTPATIENT
Start: 2019-01-01 | End: 2019-01-01

## 2019-01-01 RX ORDER — AMLODIPINE BESYLATE 5 MG/1
2.5 TABLET ORAL DAILY
Status: DISCONTINUED | OUTPATIENT
Start: 2019-01-01 | End: 2019-01-01

## 2019-01-01 RX ORDER — LANOLIN ALCOHOL/MO/W.PET/CERES
400 CREAM (GRAM) TOPICAL 3 TIMES DAILY
Status: DISCONTINUED | OUTPATIENT
Start: 2019-01-01 | End: 2019-01-01

## 2019-01-01 RX ORDER — SODIUM CHLORIDE 0.9 % (FLUSH) 0.9 %
5-40 SYRINGE (ML) INJECTION AS NEEDED
Status: CANCELLED | OUTPATIENT
Start: 2019-01-01

## 2019-01-01 RX ORDER — LORAZEPAM 2 MG/ML
1 INJECTION INTRAMUSCULAR ONCE
Status: COMPLETED | OUTPATIENT
Start: 2019-01-01 | End: 2019-01-01

## 2019-01-01 RX ORDER — GUAIFENESIN 100 MG/5ML
81 LIQUID (ML) ORAL DAILY
Status: CANCELLED | OUTPATIENT
Start: 2019-01-01

## 2019-01-01 RX ORDER — ESOMEPRAZOLE MAGNESIUM 40 MG/1
40 CAPSULE, DELAYED RELEASE ORAL
Status: DISCONTINUED | OUTPATIENT
Start: 2019-01-01 | End: 2019-01-01 | Stop reason: HOSPADM

## 2019-01-01 RX ORDER — LISINOPRIL 20 MG/1
20 TABLET ORAL 2 TIMES DAILY
Status: DISCONTINUED | OUTPATIENT
Start: 2019-01-01 | End: 2019-01-01

## 2019-01-01 RX ORDER — LORAZEPAM 2 MG/ML
1 INJECTION INTRAMUSCULAR AS NEEDED
Status: DISCONTINUED | OUTPATIENT
Start: 2019-01-01 | End: 2019-01-01 | Stop reason: HOSPADM

## 2019-01-01 RX ORDER — NIFEDIPINE 30 MG/1
60 TABLET, EXTENDED RELEASE ORAL 2 TIMES DAILY
Status: DISCONTINUED | OUTPATIENT
Start: 2019-01-01 | End: 2019-01-01

## 2019-01-01 RX ORDER — PANTOPRAZOLE SODIUM 40 MG/1
40 TABLET, DELAYED RELEASE ORAL
Status: DISCONTINUED | OUTPATIENT
Start: 2019-01-01 | End: 2019-01-01 | Stop reason: SDUPTHER

## 2019-01-01 RX ORDER — POTASSIUM CHLORIDE 20 MEQ/1
20 TABLET, EXTENDED RELEASE ORAL DAILY
Status: DISCONTINUED | OUTPATIENT
Start: 2019-01-01 | End: 2019-01-01 | Stop reason: HOSPADM

## 2019-01-01 RX ORDER — PAROXETINE HYDROCHLORIDE 20 MG/1
20 TABLET, FILM COATED ORAL DAILY
Status: DISCONTINUED | OUTPATIENT
Start: 2019-01-01 | End: 2019-01-01 | Stop reason: SDUPTHER

## 2019-01-01 RX ORDER — LABETALOL HYDROCHLORIDE 5 MG/ML
5 INJECTION, SOLUTION INTRAVENOUS
Status: DISCONTINUED | OUTPATIENT
Start: 2019-01-01 | End: 2019-01-01

## 2019-01-01 RX ORDER — HYDROCHLOROTHIAZIDE 25 MG/1
25 TABLET ORAL DAILY
Qty: 30 TAB | Refills: 1 | Status: SHIPPED | OUTPATIENT
Start: 2019-01-01 | End: 2019-01-01 | Stop reason: SDUPTHER

## 2019-01-01 RX ORDER — BISACODYL 5 MG
5 TABLET, DELAYED RELEASE (ENTERIC COATED) ORAL DAILY PRN
Status: DISCONTINUED | OUTPATIENT
Start: 2019-01-01 | End: 2019-01-01 | Stop reason: HOSPADM

## 2019-01-01 RX ORDER — PANTOPRAZOLE SODIUM 40 MG/1
40 TABLET, DELAYED RELEASE ORAL DAILY
Status: DISCONTINUED | OUTPATIENT
Start: 2019-01-01 | End: 2019-01-01 | Stop reason: ALTCHOICE

## 2019-01-01 RX ORDER — DONEPEZIL HYDROCHLORIDE 10 MG/1
10 TABLET, FILM COATED ORAL DAILY
COMMUNITY
End: 2020-01-01

## 2019-01-01 RX ORDER — LANOLIN ALCOHOL/MO/W.PET/CERES
400 CREAM (GRAM) TOPICAL 3 TIMES DAILY
Status: CANCELLED | OUTPATIENT
Start: 2019-01-01

## 2019-01-01 RX ORDER — BISMUTH SUBSALICYLATE 262 MG
1 TABLET,CHEWABLE ORAL DAILY
COMMUNITY
End: 2020-01-01

## 2019-01-01 RX ORDER — PAROXETINE HYDROCHLORIDE 20 MG/1
25 TABLET, FILM COATED ORAL DAILY
Status: ON HOLD | COMMUNITY
End: 2019-01-01

## 2019-01-01 RX ORDER — GUAIFENESIN 100 MG/5ML
100 SOLUTION ORAL
Status: DISCONTINUED | OUTPATIENT
Start: 2019-01-01 | End: 2019-01-01 | Stop reason: HOSPADM

## 2019-01-01 RX ORDER — FOSPHENYTOIN SODIUM 50 MG/ML
10 INJECTION, SOLUTION INTRAMUSCULAR; INTRAVENOUS ONCE
Status: DISCONTINUED | OUTPATIENT
Start: 2019-01-01 | End: 2019-01-01 | Stop reason: SDUPTHER

## 2019-01-01 RX ORDER — NIFEDIPINE 30 MG/1
90 TABLET, EXTENDED RELEASE ORAL 2 TIMES DAILY
Status: DISCONTINUED | OUTPATIENT
Start: 2019-01-01 | End: 2019-01-01 | Stop reason: ALTCHOICE

## 2019-01-01 RX ORDER — LEVETIRACETAM 500 MG/1
1000 TABLET ORAL 2 TIMES DAILY
COMMUNITY
End: 2019-01-01

## 2019-01-01 RX ORDER — LISINOPRIL 20 MG/1
20 TABLET ORAL 2 TIMES DAILY
Status: DISCONTINUED | OUTPATIENT
Start: 2019-01-01 | End: 2019-01-01 | Stop reason: HOSPADM

## 2019-01-01 RX ORDER — NIFEDIPINE 30 MG/1
90 TABLET, EXTENDED RELEASE ORAL 2 TIMES DAILY
Status: DISCONTINUED | OUTPATIENT
Start: 2019-01-01 | End: 2019-01-01

## 2019-01-01 RX ORDER — LISINOPRIL 20 MG/1
40 TABLET ORAL DAILY
Status: DISCONTINUED | OUTPATIENT
Start: 2019-01-01 | End: 2019-01-01 | Stop reason: HOSPADM

## 2019-01-01 RX ORDER — ATORVASTATIN CALCIUM 40 MG/1
40 TABLET, FILM COATED ORAL
Status: CANCELLED | OUTPATIENT
Start: 2019-01-01

## 2019-01-01 RX ORDER — LANOLIN ALCOHOL/MO/W.PET/CERES
400 CREAM (GRAM) TOPICAL DAILY
Qty: 30 TAB | Refills: 1 | Status: SHIPPED | OUTPATIENT
Start: 2019-01-01 | End: 2020-01-01

## 2019-01-01 RX ORDER — SODIUM CHLORIDE 0.9 % (FLUSH) 0.9 %
5-40 SYRINGE (ML) INJECTION EVERY 8 HOURS
Status: CANCELLED | OUTPATIENT
Start: 2019-01-01

## 2019-01-01 RX ORDER — LISINOPRIL 20 MG/1
20 TABLET ORAL 2 TIMES DAILY
Status: CANCELLED | OUTPATIENT
Start: 2019-01-01

## 2019-01-01 RX ORDER — POTASSIUM CHLORIDE 20 MEQ/1
20 TABLET, EXTENDED RELEASE ORAL DAILY
Status: CANCELLED | OUTPATIENT
Start: 2019-01-01

## 2019-01-01 RX ORDER — NIFEDIPINE 30 MG/1
60 TABLET, EXTENDED RELEASE ORAL 2 TIMES DAILY
Status: CANCELLED | OUTPATIENT
Start: 2019-01-01

## 2019-01-01 RX ORDER — SODIUM CHLORIDE 0.9 % (FLUSH) 0.9 %
5-40 SYRINGE (ML) INJECTION EVERY 8 HOURS
Status: DISCONTINUED | OUTPATIENT
Start: 2019-01-01 | End: 2019-01-01 | Stop reason: HOSPADM

## 2019-01-01 RX ORDER — LISINOPRIL 20 MG/1
20 TABLET ORAL 2 TIMES DAILY
COMMUNITY
End: 2020-01-01 | Stop reason: SDUPTHER

## 2019-01-01 RX ORDER — PAROXETINE HYDROCHLORIDE HEMIHYDRATE 25 MG/1
25 TABLET, FILM COATED, EXTENDED RELEASE ORAL DAILY
Status: CANCELLED | OUTPATIENT
Start: 2019-01-01

## 2019-01-01 RX ORDER — LISINOPRIL 20 MG/1
40 TABLET ORAL 2 TIMES DAILY
Status: DISCONTINUED | OUTPATIENT
Start: 2019-01-01 | End: 2019-01-01

## 2019-01-01 RX ORDER — CIPROFLOXACIN 500 MG/1
250 TABLET ORAL EVERY 12 HOURS
Status: COMPLETED | OUTPATIENT
Start: 2019-01-01 | End: 2019-01-01

## 2019-01-01 RX ORDER — DONEPEZIL HYDROCHLORIDE 10 MG/1
10 TABLET, FILM COATED ORAL DAILY
Status: DISCONTINUED | OUTPATIENT
Start: 2019-01-01 | End: 2019-01-01

## 2019-01-01 RX ORDER — LORAZEPAM 0.5 MG/1
0.5 TABLET ORAL
Qty: 25 TAB | Refills: 0 | Status: SHIPPED | OUTPATIENT
Start: 2019-01-01 | End: 2019-01-01 | Stop reason: SDUPTHER

## 2019-01-01 RX ORDER — LANOLIN ALCOHOL/MO/W.PET/CERES
400 CREAM (GRAM) TOPICAL 3 TIMES DAILY
Status: DISCONTINUED | OUTPATIENT
Start: 2019-01-01 | End: 2019-01-01 | Stop reason: HOSPADM

## 2019-01-01 RX ORDER — ATORVASTATIN CALCIUM 40 MG/1
40 TABLET, FILM COATED ORAL
Status: DISCONTINUED | OUTPATIENT
Start: 2019-01-01 | End: 2019-01-01 | Stop reason: SDUPTHER

## 2019-01-01 RX ORDER — NIFEDIPINE 30 MG/1
60 TABLET, EXTENDED RELEASE ORAL DAILY
Status: DISCONTINUED | OUTPATIENT
Start: 2019-01-01 | End: 2019-01-01

## 2019-01-01 RX ORDER — SODIUM CHLORIDE 0.9 % (FLUSH) 0.9 %
5-40 SYRINGE (ML) INJECTION EVERY 8 HOURS
Status: DISCONTINUED | OUTPATIENT
Start: 2019-01-01 | End: 2019-01-01

## 2019-01-01 RX ORDER — LEVETIRACETAM 500 MG/1
1000 TABLET ORAL 2 TIMES DAILY
Status: CANCELLED | OUTPATIENT
Start: 2019-01-01

## 2019-01-01 RX ADMIN — MAGNESIUM GLUCONATE 500 MG ORAL TABLET 400 MG: 500 TABLET ORAL at 08:31

## 2019-01-01 RX ADMIN — HYDROCHLOROTHIAZIDE 25 MG: 25 TABLET ORAL at 08:27

## 2019-01-01 RX ADMIN — ASPIRIN 81 MG 81 MG: 81 TABLET ORAL at 08:30

## 2019-01-01 RX ADMIN — MAGNESIUM GLUCONATE 500 MG ORAL TABLET 400 MG: 500 TABLET ORAL at 15:53

## 2019-01-01 RX ADMIN — LISINOPRIL 20 MG: 20 TABLET ORAL at 17:13

## 2019-01-01 RX ADMIN — PHENYTOIN SODIUM 200 MG: 100 CAPSULE ORAL at 08:04

## 2019-01-01 RX ADMIN — ESOMEPRAZOLE MAGNESIUM 40 MG: 40 CAPSULE, DELAYED RELEASE ORAL at 10:36

## 2019-01-01 RX ADMIN — NIFEDIPINE 60 MG: 30 TABLET, EXTENDED RELEASE ORAL at 23:53

## 2019-01-01 RX ADMIN — ATORVASTATIN CALCIUM 40 MG: 20 TABLET, FILM COATED ORAL at 23:53

## 2019-01-01 RX ADMIN — LISINOPRIL 20 MG: 20 TABLET ORAL at 08:16

## 2019-01-01 RX ADMIN — HEPARIN SODIUM 5000 UNITS: 5000 INJECTION INTRAVENOUS; SUBCUTANEOUS at 21:58

## 2019-01-01 RX ADMIN — MAGNESIUM GLUCONATE 500 MG ORAL TABLET 400 MG: 500 TABLET ORAL at 17:43

## 2019-01-01 RX ADMIN — GUAIFENESIN 100 MG: 100 SOLUTION ORAL at 21:53

## 2019-01-01 RX ADMIN — PHENYTOIN SODIUM 200 MG: 100 CAPSULE ORAL at 17:38

## 2019-01-01 RX ADMIN — Medication 400 MG: at 16:54

## 2019-01-01 RX ADMIN — HEPARIN SODIUM 5000 UNITS: 5000 INJECTION INTRAVENOUS; SUBCUTANEOUS at 13:59

## 2019-01-01 RX ADMIN — TUBERCULIN PURIFIED PROTEIN DERIVATIVE 5 UNITS: 5 INJECTION, SOLUTION INTRADERMAL at 17:18

## 2019-01-01 RX ADMIN — LEVETIRACETAM 1000 MG: 500 TABLET, FILM COATED ORAL at 06:30

## 2019-01-01 RX ADMIN — PHENYTOIN SODIUM 200 MG: 100 CAPSULE ORAL at 17:37

## 2019-01-01 RX ADMIN — GUAIFENESIN 100 MG: 100 SOLUTION ORAL at 22:43

## 2019-01-01 RX ADMIN — ATORVASTATIN CALCIUM 40 MG: 40 TABLET, FILM COATED ORAL at 20:41

## 2019-01-01 RX ADMIN — HEPARIN SODIUM 5000 UNITS: 5000 INJECTION INTRAVENOUS; SUBCUTANEOUS at 21:24

## 2019-01-01 RX ADMIN — MAGNESIUM GLUCONATE 500 MG ORAL TABLET 400 MG: 500 TABLET ORAL at 09:00

## 2019-01-01 RX ADMIN — MAGNESIUM GLUCONATE 500 MG ORAL TABLET 400 MG: 500 TABLET ORAL at 17:12

## 2019-01-01 RX ADMIN — MAGNESIUM GLUCONATE 500 MG ORAL TABLET 400 MG: 500 TABLET ORAL at 21:43

## 2019-01-01 RX ADMIN — MAGNESIUM GLUCONATE 500 MG ORAL TABLET 400 MG: 500 TABLET ORAL at 08:12

## 2019-01-01 RX ADMIN — POTASSIUM CHLORIDE 40 MEQ: 20 TABLET, EXTENDED RELEASE ORAL at 17:14

## 2019-01-01 RX ADMIN — HEPARIN SODIUM 5000 UNITS: 5000 INJECTION INTRAVENOUS; SUBCUTANEOUS at 05:30

## 2019-01-01 RX ADMIN — LEVETIRACETAM 1000 MG: 500 TABLET, FILM COATED ORAL at 17:13

## 2019-01-01 RX ADMIN — ATORVASTATIN CALCIUM 40 MG: 20 TABLET, FILM COATED ORAL at 21:41

## 2019-01-01 RX ADMIN — LEVETIRACETAM 1000 MG: 500 TABLET, FILM COATED ORAL at 05:41

## 2019-01-01 RX ADMIN — MAGNESIUM GLUCONATE 500 MG ORAL TABLET 400 MG: 500 TABLET ORAL at 08:57

## 2019-01-01 RX ADMIN — LISINOPRIL 20 MG: 20 TABLET ORAL at 11:00

## 2019-01-01 RX ADMIN — Medication 10 ML: at 21:34

## 2019-01-01 RX ADMIN — NIFEDIPINE 60 MG: 30 TABLET, FILM COATED, EXTENDED RELEASE ORAL at 08:16

## 2019-01-01 RX ADMIN — NIFEDIPINE 60 MG: 30 TABLET, FILM COATED, EXTENDED RELEASE ORAL at 18:16

## 2019-01-01 RX ADMIN — NIFEDIPINE 60 MG: 30 TABLET, FILM COATED, EXTENDED RELEASE ORAL at 08:32

## 2019-01-01 RX ADMIN — Medication 10 ML: at 14:57

## 2019-01-01 RX ADMIN — CIPROFLOXACIN HYDROCHLORIDE 250 MG: 500 TABLET, FILM COATED ORAL at 21:22

## 2019-01-01 RX ADMIN — LISINOPRIL 20 MG: 20 TABLET ORAL at 16:53

## 2019-01-01 RX ADMIN — TRAZODONE HYDROCHLORIDE 25 MG: 50 TABLET ORAL at 21:24

## 2019-01-01 RX ADMIN — LEVETIRACETAM 1000 MG: 500 TABLET, FILM COATED ORAL at 06:08

## 2019-01-01 RX ADMIN — PAROXETINE HYDROCHLORIDE HEMIHYDRATE 25 MG: 25 TABLET, FILM COATED, EXTENDED RELEASE ORAL at 08:10

## 2019-01-01 RX ADMIN — SODIUM CHLORIDE 850 MG PE: 900 INJECTION, SOLUTION INTRAVENOUS at 10:42

## 2019-01-01 RX ADMIN — LEVETIRACETAM 1000 MG: 500 TABLET, FILM COATED ORAL at 17:42

## 2019-01-01 RX ADMIN — HEPARIN SODIUM 5000 UNITS: 5000 INJECTION INTRAVENOUS; SUBCUTANEOUS at 00:46

## 2019-01-01 RX ADMIN — NIFEDIPINE 60 MG: 30 TABLET, EXTENDED RELEASE ORAL at 10:35

## 2019-01-01 RX ADMIN — HEPARIN SODIUM 5000 UNITS: 5000 INJECTION INTRAVENOUS; SUBCUTANEOUS at 06:00

## 2019-01-01 RX ADMIN — HEPARIN SODIUM 5000 UNITS: 5000 INJECTION INTRAVENOUS; SUBCUTANEOUS at 05:46

## 2019-01-01 RX ADMIN — NIFEDIPINE 90 MG: 30 TABLET, FILM COATED, EXTENDED RELEASE ORAL at 21:21

## 2019-01-01 RX ADMIN — ASPIRIN 81 MG 81 MG: 81 TABLET ORAL at 09:19

## 2019-01-01 RX ADMIN — ASPIRIN 81 MG 81 MG: 81 TABLET ORAL at 08:12

## 2019-01-01 RX ADMIN — Medication 400 MG: at 21:43

## 2019-01-01 RX ADMIN — PHENYTOIN SODIUM 200 MG: 100 CAPSULE ORAL at 08:38

## 2019-01-01 RX ADMIN — PHENYTOIN SODIUM 200 MG: 100 CAPSULE ORAL at 21:41

## 2019-01-01 RX ADMIN — LISINOPRIL 40 MG: 20 TABLET ORAL at 18:34

## 2019-01-01 RX ADMIN — LEVETIRACETAM 1000 MG: 500 TABLET, FILM COATED ORAL at 17:43

## 2019-01-01 RX ADMIN — PHENYTOIN SODIUM 200 MG: 100 CAPSULE ORAL at 08:57

## 2019-01-01 RX ADMIN — PHENYTOIN SODIUM 200 MG: 100 CAPSULE ORAL at 18:34

## 2019-01-01 RX ADMIN — Medication 5 ML: at 06:15

## 2019-01-01 RX ADMIN — LEVETIRACETAM 1000 MG: 500 TABLET, FILM COATED ORAL at 03:28

## 2019-01-01 RX ADMIN — TRAZODONE HYDROCHLORIDE 25 MG: 50 TABLET ORAL at 21:21

## 2019-01-01 RX ADMIN — MAGNESIUM GLUCONATE 500 MG ORAL TABLET 400 MG: 500 TABLET ORAL at 21:33

## 2019-01-01 RX ADMIN — Medication 5 ML: at 21:42

## 2019-01-01 RX ADMIN — ACETAMINOPHEN 650 MG: 325 TABLET, FILM COATED ORAL at 15:09

## 2019-01-01 RX ADMIN — NIFEDIPINE 60 MG: 30 TABLET, FILM COATED, EXTENDED RELEASE ORAL at 17:29

## 2019-01-01 RX ADMIN — MAGNESIUM GLUCONATE 500 MG ORAL TABLET 400 MG: 500 TABLET ORAL at 22:01

## 2019-01-01 RX ADMIN — PAROXETINE HYDROCHLORIDE HEMIHYDRATE 25 MG: 25 TABLET, FILM COATED, EXTENDED RELEASE ORAL at 09:09

## 2019-01-01 RX ADMIN — Medication 5 ML: at 21:23

## 2019-01-01 RX ADMIN — LISINOPRIL 20 MG: 20 TABLET ORAL at 08:10

## 2019-01-01 RX ADMIN — PHENYTOIN SODIUM 200 MG: 100 CAPSULE ORAL at 17:31

## 2019-01-01 RX ADMIN — Medication 10 ML: at 14:29

## 2019-01-01 RX ADMIN — Medication 10 ML: at 05:46

## 2019-01-01 RX ADMIN — LISINOPRIL 20 MG: 20 TABLET ORAL at 08:04

## 2019-01-01 RX ADMIN — HEPARIN SODIUM 5000 UNITS: 5000 INJECTION INTRAVENOUS; SUBCUTANEOUS at 06:24

## 2019-01-01 RX ADMIN — DONEPEZIL HYDROCHLORIDE 5 MG: 5 TABLET, FILM COATED ORAL at 08:11

## 2019-01-01 RX ADMIN — ATORVASTATIN CALCIUM 40 MG: 40 TABLET, FILM COATED ORAL at 21:38

## 2019-01-01 RX ADMIN — NIFEDIPINE 90 MG: 30 TABLET, FILM COATED, EXTENDED RELEASE ORAL at 21:18

## 2019-01-01 RX ADMIN — LISINOPRIL 20 MG: 20 TABLET ORAL at 09:28

## 2019-01-01 RX ADMIN — DONEPEZIL HYDROCHLORIDE 5 MG: 5 TABLET, FILM COATED ORAL at 09:24

## 2019-01-01 RX ADMIN — NIFEDIPINE 60 MG: 30 TABLET, FILM COATED, EXTENDED RELEASE ORAL at 08:30

## 2019-01-01 RX ADMIN — HEPARIN SODIUM 5000 UNITS: 5000 INJECTION INTRAVENOUS; SUBCUTANEOUS at 00:41

## 2019-01-01 RX ADMIN — NIFEDIPINE 60 MG: 30 TABLET, FILM COATED, EXTENDED RELEASE ORAL at 08:26

## 2019-01-01 RX ADMIN — PAROXETINE HYDROCHLORIDE HEMIHYDRATE 25 MG: 25 TABLET, FILM COATED, EXTENDED RELEASE ORAL at 08:36

## 2019-01-01 RX ADMIN — TRAZODONE HYDROCHLORIDE 25 MG: 50 TABLET ORAL at 19:38

## 2019-01-01 RX ADMIN — Medication 5 ML: at 06:13

## 2019-01-01 RX ADMIN — HEPARIN SODIUM 5000 UNITS: 5000 INJECTION INTRAVENOUS; SUBCUTANEOUS at 22:05

## 2019-01-01 RX ADMIN — HEPARIN SODIUM 5000 UNITS: 5000 INJECTION INTRAVENOUS; SUBCUTANEOUS at 06:15

## 2019-01-01 RX ADMIN — HEPARIN SODIUM 5000 UNITS: 5000 INJECTION INTRAVENOUS; SUBCUTANEOUS at 21:10

## 2019-01-01 RX ADMIN — LISINOPRIL 40 MG: 20 TABLET ORAL at 08:12

## 2019-01-01 RX ADMIN — MAGNESIUM GLUCONATE 500 MG ORAL TABLET 400 MG: 500 TABLET ORAL at 21:59

## 2019-01-01 RX ADMIN — LISINOPRIL 40 MG: 20 TABLET ORAL at 17:15

## 2019-01-01 RX ADMIN — PAROXETINE HYDROCHLORIDE HEMIHYDRATE 25 MG: 25 TABLET, FILM COATED, EXTENDED RELEASE ORAL at 08:17

## 2019-01-01 RX ADMIN — PHENYTOIN SODIUM 200 MG: 100 CAPSULE ORAL at 08:33

## 2019-01-01 RX ADMIN — ACETAMINOPHEN 650 MG: 325 TABLET, FILM COATED ORAL at 21:21

## 2019-01-01 RX ADMIN — MAGNESIUM GLUCONATE 500 MG ORAL TABLET 400 MG: 500 TABLET ORAL at 08:27

## 2019-01-01 RX ADMIN — NIFEDIPINE 60 MG: 30 TABLET, FILM COATED, EXTENDED RELEASE ORAL at 09:19

## 2019-01-01 RX ADMIN — HEPARIN SODIUM 5000 UNITS: 5000 INJECTION INTRAVENOUS; SUBCUTANEOUS at 22:00

## 2019-01-01 RX ADMIN — GUAIFENESIN 100 MG: 100 SOLUTION ORAL at 21:18

## 2019-01-01 RX ADMIN — Medication 400 MG: at 10:34

## 2019-01-01 RX ADMIN — LEVETIRACETAM 1000 MG: 500 TABLET, FILM COATED ORAL at 16:56

## 2019-01-01 RX ADMIN — ATORVASTATIN CALCIUM 40 MG: 40 TABLET, FILM COATED ORAL at 21:21

## 2019-01-01 RX ADMIN — LISINOPRIL 40 MG: 20 TABLET ORAL at 17:41

## 2019-01-01 RX ADMIN — Medication 10 ML: at 05:26

## 2019-01-01 RX ADMIN — Medication 400 MG: at 09:00

## 2019-01-01 RX ADMIN — MAGNESIUM GLUCONATE 500 MG ORAL TABLET 400 MG: 500 TABLET ORAL at 21:57

## 2019-01-01 RX ADMIN — MAGNESIUM GLUCONATE 500 MG ORAL TABLET 400 MG: 500 TABLET ORAL at 21:23

## 2019-01-01 RX ADMIN — PHENYTOIN SODIUM 200 MG: 100 CAPSULE ORAL at 08:31

## 2019-01-01 RX ADMIN — LEVETIRACETAM 1000 MG: 500 TABLET ORAL at 16:53

## 2019-01-01 RX ADMIN — PHENYTOIN SODIUM 200 MG: 100 CAPSULE ORAL at 08:52

## 2019-01-01 RX ADMIN — LEVETIRACETAM 1000 MG: 500 TABLET ORAL at 17:14

## 2019-01-01 RX ADMIN — POTASSIUM CHLORIDE 20 MEQ: 20 TABLET, EXTENDED RELEASE ORAL at 08:57

## 2019-01-01 RX ADMIN — ATORVASTATIN CALCIUM 40 MG: 40 TABLET, FILM COATED ORAL at 22:01

## 2019-01-01 RX ADMIN — LISINOPRIL 40 MG: 20 TABLET ORAL at 09:19

## 2019-01-01 RX ADMIN — LEVETIRACETAM 1000 MG: 500 TABLET ORAL at 03:44

## 2019-01-01 RX ADMIN — GUAIFENESIN 100 MG: 100 SOLUTION ORAL at 21:11

## 2019-01-01 RX ADMIN — HEPARIN SODIUM 5000 UNITS: 5000 INJECTION INTRAVENOUS; SUBCUTANEOUS at 21:46

## 2019-01-01 RX ADMIN — ATORVASTATIN CALCIUM 40 MG: 20 TABLET, FILM COATED ORAL at 21:44

## 2019-01-01 RX ADMIN — HEPARIN SODIUM 5000 UNITS: 5000 INJECTION INTRAVENOUS; SUBCUTANEOUS at 06:09

## 2019-01-01 RX ADMIN — NIFEDIPINE 60 MG: 30 TABLET, FILM COATED, EXTENDED RELEASE ORAL at 12:01

## 2019-01-01 RX ADMIN — NIFEDIPINE 60 MG: 30 TABLET, FILM COATED, EXTENDED RELEASE ORAL at 18:34

## 2019-01-01 RX ADMIN — ASPIRIN 81 MG 81 MG: 81 TABLET ORAL at 08:04

## 2019-01-01 RX ADMIN — LEVETIRACETAM 1000 MG: 500 TABLET ORAL at 05:14

## 2019-01-01 RX ADMIN — HEPARIN SODIUM 5000 UNITS: 5000 INJECTION INTRAVENOUS; SUBCUTANEOUS at 19:40

## 2019-01-01 RX ADMIN — NIFEDIPINE 60 MG: 30 TABLET, EXTENDED RELEASE ORAL at 09:27

## 2019-01-01 RX ADMIN — POTASSIUM CHLORIDE 20 MEQ: 20 TABLET, EXTENDED RELEASE ORAL at 10:34

## 2019-01-01 RX ADMIN — PAROXETINE HYDROCHLORIDE HEMIHYDRATE 25 MG: 25 TABLET, FILM COATED, EXTENDED RELEASE ORAL at 13:01

## 2019-01-01 RX ADMIN — PHENYTOIN SODIUM 200 MG: 100 CAPSULE ORAL at 09:06

## 2019-01-01 RX ADMIN — HEPARIN SODIUM 5000 UNITS: 5000 INJECTION INTRAVENOUS; SUBCUTANEOUS at 10:31

## 2019-01-01 RX ADMIN — NIFEDIPINE 60 MG: 30 TABLET, FILM COATED, EXTENDED RELEASE ORAL at 08:31

## 2019-01-01 RX ADMIN — LEVETIRACETAM 1000 MG: 500 TABLET, FILM COATED ORAL at 17:15

## 2019-01-01 RX ADMIN — LISINOPRIL 40 MG: 20 TABLET ORAL at 08:33

## 2019-01-01 RX ADMIN — HYDROCHLOROTHIAZIDE 25 MG: 25 TABLET ORAL at 08:12

## 2019-01-01 RX ADMIN — LISINOPRIL 40 MG: 20 TABLET ORAL at 18:15

## 2019-01-01 RX ADMIN — HEPARIN SODIUM 5000 UNITS: 5000 INJECTION INTRAVENOUS; SUBCUTANEOUS at 15:37

## 2019-01-01 RX ADMIN — DONEPEZIL HYDROCHLORIDE 5 MG: 5 TABLET, FILM COATED ORAL at 08:16

## 2019-01-01 RX ADMIN — PHENYTOIN SODIUM 200 MG: 100 CAPSULE ORAL at 17:00

## 2019-01-01 RX ADMIN — CIPROFLOXACIN HYDROCHLORIDE 250 MG: 500 TABLET, FILM COATED ORAL at 10:26

## 2019-01-01 RX ADMIN — HEPARIN SODIUM 5000 UNITS: 5000 INJECTION INTRAVENOUS; SUBCUTANEOUS at 14:05

## 2019-01-01 RX ADMIN — GUAIFENESIN 100 MG: 100 SOLUTION ORAL at 00:10

## 2019-01-01 RX ADMIN — NIFEDIPINE 60 MG: 30 TABLET, FILM COATED, EXTENDED RELEASE ORAL at 17:15

## 2019-01-01 RX ADMIN — GUAIFENESIN 100 MG: 100 SOLUTION ORAL at 22:40

## 2019-01-01 RX ADMIN — MAGNESIUM GLUCONATE 500 MG ORAL TABLET 400 MG: 500 TABLET ORAL at 16:43

## 2019-01-01 RX ADMIN — PHENYTOIN SODIUM 200 MG: 100 CAPSULE ORAL at 08:16

## 2019-01-01 RX ADMIN — NIFEDIPINE 60 MG: 30 TABLET, FILM COATED, EXTENDED RELEASE ORAL at 21:45

## 2019-01-01 RX ADMIN — NIFEDIPINE 60 MG: 30 TABLET, EXTENDED RELEASE ORAL at 21:41

## 2019-01-01 RX ADMIN — MAGNESIUM GLUCONATE 500 MG ORAL TABLET 400 MG: 500 TABLET ORAL at 08:11

## 2019-01-01 RX ADMIN — Medication 10 ML: at 21:48

## 2019-01-01 RX ADMIN — Medication 10 ML: at 13:50

## 2019-01-01 RX ADMIN — Medication 10 ML: at 14:52

## 2019-01-01 RX ADMIN — HEPARIN SODIUM 5000 UNITS: 5000 INJECTION INTRAVENOUS; SUBCUTANEOUS at 14:24

## 2019-01-01 RX ADMIN — LISINOPRIL 20 MG: 20 TABLET ORAL at 17:41

## 2019-01-01 RX ADMIN — CIPROFLOXACIN HYDROCHLORIDE 250 MG: 500 TABLET, FILM COATED ORAL at 21:57

## 2019-01-01 RX ADMIN — TRAZODONE HYDROCHLORIDE 25 MG: 50 TABLET ORAL at 21:17

## 2019-01-01 RX ADMIN — LEVETIRACETAM 1000 MG: 500 TABLET, FILM COATED ORAL at 06:13

## 2019-01-01 RX ADMIN — NIFEDIPINE 60 MG: 30 TABLET, FILM COATED, EXTENDED RELEASE ORAL at 08:33

## 2019-01-01 RX ADMIN — NIFEDIPINE 60 MG: 30 TABLET, FILM COATED, EXTENDED RELEASE ORAL at 08:11

## 2019-01-01 RX ADMIN — Medication 400 MG: at 21:41

## 2019-01-01 RX ADMIN — ESOMEPRAZOLE MAGNESIUM 40 MG: 40 CAPSULE, DELAYED RELEASE ORAL at 12:59

## 2019-01-01 RX ADMIN — HYDROCHLOROTHIAZIDE 25 MG: 25 TABLET ORAL at 09:00

## 2019-01-01 RX ADMIN — HEPARIN SODIUM 5000 UNITS: 5000 INJECTION INTRAVENOUS; SUBCUTANEOUS at 21:33

## 2019-01-01 RX ADMIN — MAGNESIUM GLUCONATE 500 MG ORAL TABLET 400 MG: 500 TABLET ORAL at 19:39

## 2019-01-01 RX ADMIN — HEPARIN SODIUM 5000 UNITS: 5000 INJECTION INTRAVENOUS; SUBCUTANEOUS at 06:30

## 2019-01-01 RX ADMIN — ATORVASTATIN CALCIUM 40 MG: 40 TABLET, FILM COATED ORAL at 21:33

## 2019-01-01 RX ADMIN — MAGNESIUM GLUCONATE 500 MG ORAL TABLET 400 MG: 500 TABLET ORAL at 08:16

## 2019-01-01 RX ADMIN — NIFEDIPINE 60 MG: 30 TABLET, FILM COATED, EXTENDED RELEASE ORAL at 22:04

## 2019-01-01 RX ADMIN — ATORVASTATIN CALCIUM 40 MG: 40 TABLET, FILM COATED ORAL at 21:42

## 2019-01-01 RX ADMIN — PHENYTOIN SODIUM 200 MG: 100 CAPSULE ORAL at 08:27

## 2019-01-01 RX ADMIN — PAROXETINE HYDROCHLORIDE HEMIHYDRATE 25 MG: 25 TABLET, FILM COATED, EXTENDED RELEASE ORAL at 08:19

## 2019-01-01 RX ADMIN — HEPARIN SODIUM 5000 UNITS: 5000 INJECTION INTRAVENOUS; SUBCUTANEOUS at 09:49

## 2019-01-01 RX ADMIN — MAGNESIUM GLUCONATE 500 MG ORAL TABLET 400 MG: 500 TABLET ORAL at 17:37

## 2019-01-01 RX ADMIN — Medication 5 ML: at 06:10

## 2019-01-01 RX ADMIN — ASPIRIN 81 MG 81 MG: 81 TABLET ORAL at 08:58

## 2019-01-01 RX ADMIN — Medication 10 ML: at 14:00

## 2019-01-01 RX ADMIN — MAGNESIUM GLUCONATE 500 MG ORAL TABLET 400 MG: 500 TABLET ORAL at 21:21

## 2019-01-01 RX ADMIN — MAGNESIUM GLUCONATE 500 MG ORAL TABLET 400 MG: 500 TABLET ORAL at 16:55

## 2019-01-01 RX ADMIN — DONEPEZIL HYDROCHLORIDE 5 MG: 5 TABLET, FILM COATED ORAL at 21:58

## 2019-01-01 RX ADMIN — CIPROFLOXACIN HYDROCHLORIDE 250 MG: 500 TABLET, FILM COATED ORAL at 08:03

## 2019-01-01 RX ADMIN — NIFEDIPINE 60 MG: 30 TABLET, FILM COATED, EXTENDED RELEASE ORAL at 17:37

## 2019-01-01 RX ADMIN — BISACODYL 5 MG: 5 TABLET, COATED ORAL at 09:07

## 2019-01-01 RX ADMIN — ATORVASTATIN CALCIUM 40 MG: 40 TABLET, FILM COATED ORAL at 21:18

## 2019-01-01 RX ADMIN — HEPARIN SODIUM 5000 UNITS: 5000 INJECTION INTRAVENOUS; SUBCUTANEOUS at 06:13

## 2019-01-01 RX ADMIN — HEPARIN SODIUM 5000 UNITS: 5000 INJECTION INTRAVENOUS; SUBCUTANEOUS at 14:00

## 2019-01-01 RX ADMIN — HEPARIN SODIUM 5000 UNITS: 5000 INJECTION INTRAVENOUS; SUBCUTANEOUS at 15:10

## 2019-01-01 RX ADMIN — PAROXETINE HYDROCHLORIDE HEMIHYDRATE 25 MG: 25 TABLET, FILM COATED, EXTENDED RELEASE ORAL at 09:00

## 2019-01-01 RX ADMIN — PHENYTOIN SODIUM 200 MG: 100 CAPSULE ORAL at 08:11

## 2019-01-01 RX ADMIN — NIFEDIPINE 60 MG: 30 TABLET, EXTENDED RELEASE ORAL at 21:44

## 2019-01-01 RX ADMIN — CIPROFLOXACIN HYDROCHLORIDE 250 MG: 500 TABLET, FILM COATED ORAL at 08:57

## 2019-01-01 RX ADMIN — PHENYTOIN SODIUM 200 MG: 100 CAPSULE ORAL at 09:00

## 2019-01-01 RX ADMIN — GUAIFENESIN 100 MG: 100 SOLUTION ORAL at 03:29

## 2019-01-01 RX ADMIN — Medication 10 ML: at 22:11

## 2019-01-01 RX ADMIN — HEPARIN SODIUM 5000 UNITS: 5000 INJECTION INTRAVENOUS; SUBCUTANEOUS at 00:11

## 2019-01-01 RX ADMIN — MAGNESIUM GLUCONATE 500 MG ORAL TABLET 400 MG: 500 TABLET ORAL at 21:12

## 2019-01-01 RX ADMIN — LEVETIRACETAM 1000 MG: 500 TABLET, FILM COATED ORAL at 17:00

## 2019-01-01 RX ADMIN — DONEPEZIL HYDROCHLORIDE 5 MG: 5 TABLET, FILM COATED ORAL at 09:06

## 2019-01-01 RX ADMIN — NIFEDIPINE 90 MG: 30 TABLET, FILM COATED, EXTENDED RELEASE ORAL at 08:03

## 2019-01-01 RX ADMIN — LORAZEPAM 1 MG: 2 INJECTION INTRAMUSCULAR; INTRAVENOUS at 20:21

## 2019-01-01 RX ADMIN — PAROXETINE HYDROCHLORIDE HEMIHYDRATE 25 MG: 25 TABLET, FILM COATED, EXTENDED RELEASE ORAL at 08:32

## 2019-01-01 RX ADMIN — CIPROFLOXACIN HYDROCHLORIDE 250 MG: 500 TABLET, FILM COATED ORAL at 20:49

## 2019-01-01 RX ADMIN — ATORVASTATIN CALCIUM 40 MG: 40 TABLET, FILM COATED ORAL at 23:18

## 2019-01-01 RX ADMIN — LEVETIRACETAM 1000 MG: 500 TABLET ORAL at 04:49

## 2019-01-01 RX ADMIN — PHENYTOIN SODIUM 200 MG: 100 CAPSULE ORAL at 18:15

## 2019-01-01 RX ADMIN — Medication 10 ML: at 23:56

## 2019-01-01 RX ADMIN — PHENYTOIN SODIUM 200 MG: 100 CAPSULE ORAL at 09:24

## 2019-01-01 RX ADMIN — Medication 10 ML: at 17:54

## 2019-01-01 RX ADMIN — Medication 5 ML: at 21:47

## 2019-01-01 RX ADMIN — DONEPEZIL HYDROCHLORIDE 10 MG: 10 TABLET, FILM COATED ORAL at 10:33

## 2019-01-01 RX ADMIN — LEVETIRACETAM 1000 MG: 500 TABLET, FILM COATED ORAL at 18:34

## 2019-01-01 RX ADMIN — HYDROCHLOROTHIAZIDE 25 MG: 25 TABLET ORAL at 08:32

## 2019-01-01 RX ADMIN — HEPARIN SODIUM 5000 UNITS: 5000 INJECTION INTRAVENOUS; SUBCUTANEOUS at 14:21

## 2019-01-01 RX ADMIN — SODIUM CHLORIDE 100 ML: 900 INJECTION, SOLUTION INTRAVENOUS at 17:55

## 2019-01-01 RX ADMIN — NIFEDIPINE 60 MG: 30 TABLET, FILM COATED, EXTENDED RELEASE ORAL at 09:06

## 2019-01-01 RX ADMIN — Medication 400 MG: at 17:15

## 2019-01-01 RX ADMIN — Medication 5 ML: at 05:42

## 2019-01-01 RX ADMIN — ASPIRIN 81 MG 81 MG: 81 TABLET ORAL at 08:32

## 2019-01-01 RX ADMIN — HEPARIN SODIUM 5000 UNITS: 5000 INJECTION INTRAVENOUS; SUBCUTANEOUS at 05:21

## 2019-01-01 RX ADMIN — LEVETIRACETAM 1000 MG: 500 TABLET, FILM COATED ORAL at 05:43

## 2019-01-01 RX ADMIN — ASPIRIN 81 MG 81 MG: 81 TABLET ORAL at 08:16

## 2019-01-01 RX ADMIN — NIFEDIPINE 60 MG: 30 TABLET, FILM COATED, EXTENDED RELEASE ORAL at 17:41

## 2019-01-01 RX ADMIN — MAGNESIUM GLUCONATE 500 MG ORAL TABLET 400 MG: 500 TABLET ORAL at 17:41

## 2019-01-01 RX ADMIN — Medication 5 ML: at 05:16

## 2019-01-01 RX ADMIN — HEPARIN SODIUM 5000 UNITS: 5000 INJECTION INTRAVENOUS; SUBCUTANEOUS at 13:19

## 2019-01-01 RX ADMIN — MAGNESIUM GLUCONATE 500 MG ORAL TABLET 400 MG: 500 TABLET ORAL at 17:15

## 2019-01-01 RX ADMIN — LISINOPRIL 20 MG: 20 TABLET ORAL at 08:40

## 2019-01-01 RX ADMIN — HEPARIN SODIUM 5000 UNITS: 5000 INJECTION INTRAVENOUS; SUBCUTANEOUS at 21:18

## 2019-01-01 RX ADMIN — Medication 10 ML: at 05:56

## 2019-01-01 RX ADMIN — ASPIRIN 81 MG 81 MG: 81 TABLET ORAL at 08:02

## 2019-01-01 RX ADMIN — HEPARIN SODIUM 5000 UNITS: 5000 INJECTION INTRAVENOUS; SUBCUTANEOUS at 06:08

## 2019-01-01 RX ADMIN — DONEPEZIL HYDROCHLORIDE 5 MG: 5 TABLET, FILM COATED ORAL at 08:31

## 2019-01-01 RX ADMIN — LORAZEPAM 1 MG: 2 INJECTION INTRAMUSCULAR; INTRAVENOUS at 00:04

## 2019-01-01 RX ADMIN — MAGNESIUM GLUCONATE 500 MG ORAL TABLET 400 MG: 500 TABLET ORAL at 21:19

## 2019-01-01 RX ADMIN — GUAIFENESIN 100 MG: 100 SOLUTION ORAL at 21:37

## 2019-01-01 RX ADMIN — MAGNESIUM GLUCONATE 500 MG ORAL TABLET 400 MG: 500 TABLET ORAL at 09:06

## 2019-01-01 RX ADMIN — MAGNESIUM GLUCONATE 500 MG ORAL TABLET 400 MG: 500 TABLET ORAL at 17:31

## 2019-01-01 RX ADMIN — LISINOPRIL 20 MG: 20 TABLET ORAL at 08:02

## 2019-01-01 RX ADMIN — Medication 10 ML: at 22:01

## 2019-01-01 RX ADMIN — ESOMEPRAZOLE MAGNESIUM 40 MG: 40 CAPSULE, DELAYED RELEASE ORAL at 09:27

## 2019-01-01 RX ADMIN — PHENYTOIN SODIUM 200 MG: 100 CAPSULE ORAL at 17:28

## 2019-01-01 RX ADMIN — LEVETIRACETAM 1000 MG: 500 TABLET, FILM COATED ORAL at 17:37

## 2019-01-01 RX ADMIN — HEPARIN SODIUM 5000 UNITS: 5000 INJECTION INTRAVENOUS; SUBCUTANEOUS at 22:01

## 2019-01-01 RX ADMIN — LEVETIRACETAM 1000 MG: 500 TABLET, FILM COATED ORAL at 17:31

## 2019-01-01 RX ADMIN — MAGNESIUM GLUCONATE 500 MG ORAL TABLET 400 MG: 500 TABLET ORAL at 22:04

## 2019-01-01 RX ADMIN — LEVETIRACETAM 1000 MG: 500 TABLET, FILM COATED ORAL at 17:29

## 2019-01-01 RX ADMIN — DONEPEZIL HYDROCHLORIDE 5 MG: 5 TABLET, FILM COATED ORAL at 09:19

## 2019-01-01 RX ADMIN — PERFLUTREN 1 ML: 6.52 INJECTION, SUSPENSION INTRAVENOUS at 09:20

## 2019-01-01 RX ADMIN — Medication 5 ML: at 05:26

## 2019-01-01 RX ADMIN — LEVETIRACETAM 1000 MG: 500 TABLET, FILM COATED ORAL at 06:14

## 2019-01-01 RX ADMIN — LISINOPRIL 40 MG: 20 TABLET ORAL at 17:37

## 2019-01-01 RX ADMIN — LORAZEPAM 1 MG: 2 INJECTION INTRAMUSCULAR; INTRAVENOUS at 20:56

## 2019-01-01 RX ADMIN — LEVETIRACETAM 1000 MG: 500 TABLET, FILM COATED ORAL at 17:14

## 2019-01-01 RX ADMIN — NIFEDIPINE 60 MG: 30 TABLET, FILM COATED, EXTENDED RELEASE ORAL at 17:00

## 2019-01-01 RX ADMIN — ACETAMINOPHEN 650 MG: 325 TABLET, FILM COATED ORAL at 21:47

## 2019-01-01 RX ADMIN — DONEPEZIL HYDROCHLORIDE 5 MG: 5 TABLET, FILM COATED ORAL at 08:02

## 2019-01-01 RX ADMIN — LEVETIRACETAM 1000 MG: 500 TABLET ORAL at 04:02

## 2019-01-01 RX ADMIN — IOPAMIDOL 70 ML: 755 INJECTION, SOLUTION INTRAVENOUS at 17:55

## 2019-01-01 RX ADMIN — PAROXETINE HYDROCHLORIDE HEMIHYDRATE 25 MG: 25 TABLET, FILM COATED, EXTENDED RELEASE ORAL at 09:18

## 2019-01-01 RX ADMIN — HEPARIN SODIUM 5000 UNITS: 5000 INJECTION INTRAVENOUS; SUBCUTANEOUS at 21:12

## 2019-01-01 RX ADMIN — HEPARIN SODIUM 5000 UNITS: 5000 INJECTION INTRAVENOUS; SUBCUTANEOUS at 13:33

## 2019-01-01 RX ADMIN — Medication 5 ML: at 21:28

## 2019-01-01 RX ADMIN — HYDROCHLOROTHIAZIDE 25 MG: 25 TABLET ORAL at 12:01

## 2019-01-01 RX ADMIN — HEPARIN SODIUM 5000 UNITS: 5000 INJECTION INTRAVENOUS; SUBCUTANEOUS at 16:54

## 2019-01-01 RX ADMIN — LEVETIRACETAM 1000 MG: 500 TABLET, FILM COATED ORAL at 05:22

## 2019-01-01 RX ADMIN — LISINOPRIL 20 MG: 20 TABLET ORAL at 17:12

## 2019-01-01 RX ADMIN — MAGNESIUM GLUCONATE 500 MG ORAL TABLET 400 MG: 500 TABLET ORAL at 18:16

## 2019-01-01 RX ADMIN — HEPARIN SODIUM 5000 UNITS: 5000 INJECTION INTRAVENOUS; SUBCUTANEOUS at 16:18

## 2019-01-01 RX ADMIN — Medication 5 ML: at 14:00

## 2019-01-01 RX ADMIN — CIPROFLOXACIN HYDROCHLORIDE 250 MG: 500 TABLET, FILM COATED ORAL at 08:38

## 2019-01-01 RX ADMIN — Medication 400 MG: at 13:01

## 2019-01-01 RX ADMIN — NIFEDIPINE 90 MG: 30 TABLET, FILM COATED, EXTENDED RELEASE ORAL at 08:37

## 2019-01-01 RX ADMIN — ASPIRIN 81 MG 81 MG: 81 TABLET ORAL at 09:06

## 2019-01-01 RX ADMIN — Medication 10 ML: at 15:18

## 2019-01-01 RX ADMIN — LISINOPRIL 20 MG: 20 TABLET ORAL at 09:01

## 2019-01-01 RX ADMIN — TRAZODONE HYDROCHLORIDE 25 MG: 50 TABLET ORAL at 21:43

## 2019-01-01 RX ADMIN — LISINOPRIL 40 MG: 20 TABLET ORAL at 17:00

## 2019-01-01 RX ADMIN — LEVETIRACETAM 1000 MG: 500 TABLET, FILM COATED ORAL at 06:00

## 2019-01-01 RX ADMIN — LISINOPRIL 40 MG: 20 TABLET ORAL at 08:27

## 2019-01-01 RX ADMIN — PAROXETINE HYDROCHLORIDE HEMIHYDRATE 25 MG: 25 TABLET, FILM COATED, EXTENDED RELEASE ORAL at 09:05

## 2019-01-01 RX ADMIN — MAGNESIUM GLUCONATE 500 MG ORAL TABLET 400 MG: 500 TABLET ORAL at 15:32

## 2019-01-01 RX ADMIN — HEPARIN SODIUM 5000 UNITS: 5000 INJECTION INTRAVENOUS; SUBCUTANEOUS at 21:37

## 2019-01-01 RX ADMIN — HYDROCHLOROTHIAZIDE 25 MG: 25 TABLET ORAL at 08:30

## 2019-01-01 RX ADMIN — ATORVASTATIN CALCIUM 40 MG: 40 TABLET, FILM COATED ORAL at 21:24

## 2019-01-01 RX ADMIN — LEVETIRACETAM 1000 MG: 500 TABLET, FILM COATED ORAL at 18:16

## 2019-01-01 RX ADMIN — CIPROFLOXACIN HYDROCHLORIDE 250 MG: 500 TABLET, FILM COATED ORAL at 21:46

## 2019-01-01 RX ADMIN — HYDROCHLOROTHIAZIDE 25 MG: 25 TABLET ORAL at 09:19

## 2019-01-01 RX ADMIN — NIFEDIPINE 90 MG: 30 TABLET, FILM COATED, EXTENDED RELEASE ORAL at 08:04

## 2019-01-01 RX ADMIN — HEPARIN SODIUM 5000 UNITS: 5000 INJECTION INTRAVENOUS; SUBCUTANEOUS at 23:54

## 2019-01-01 RX ADMIN — ASPIRIN 81 MG 81 MG: 81 TABLET ORAL at 09:24

## 2019-01-01 RX ADMIN — ASPIRIN 81 MG 81 MG: 81 TABLET ORAL at 09:49

## 2019-01-01 RX ADMIN — HEPARIN SODIUM 5000 UNITS: 5000 INJECTION INTRAVENOUS; SUBCUTANEOUS at 05:27

## 2019-01-01 RX ADMIN — Medication 5 ML: at 21:45

## 2019-01-01 RX ADMIN — PHENYTOIN SODIUM 200 MG: 100 CAPSULE ORAL at 17:15

## 2019-01-01 RX ADMIN — GUAIFENESIN 100 MG: 100 SOLUTION ORAL at 21:20

## 2019-01-01 RX ADMIN — PHENYTOIN SODIUM 200 MG: 100 CAPSULE ORAL at 17:14

## 2019-01-01 RX ADMIN — PAROXETINE HYDROCHLORIDE HEMIHYDRATE 25 MG: 25 TABLET, FILM COATED, EXTENDED RELEASE ORAL at 09:02

## 2019-01-01 RX ADMIN — PAROXETINE HYDROCHLORIDE HEMIHYDRATE 25 MG: 25 TABLET, FILM COATED, EXTENDED RELEASE ORAL at 08:30

## 2019-01-01 RX ADMIN — HYDROCHLOROTHIAZIDE 25 MG: 25 TABLET ORAL at 08:33

## 2019-01-01 RX ADMIN — LISINOPRIL 20 MG: 20 TABLET ORAL at 08:58

## 2019-01-01 RX ADMIN — MAGNESIUM GLUCONATE 500 MG ORAL TABLET 400 MG: 500 TABLET ORAL at 08:02

## 2019-01-01 RX ADMIN — ACETAMINOPHEN 650 MG: 325 TABLET, FILM COATED ORAL at 20:01

## 2019-01-01 RX ADMIN — DONEPEZIL HYDROCHLORIDE 5 MG: 5 TABLET, FILM COATED ORAL at 08:12

## 2019-01-01 RX ADMIN — GUAIFENESIN 100 MG: 100 SOLUTION ORAL at 21:23

## 2019-01-01 RX ADMIN — TRAZODONE HYDROCHLORIDE 25 MG: 50 TABLET ORAL at 21:10

## 2019-01-01 RX ADMIN — Medication 400 MG: at 17:21

## 2019-01-01 RX ADMIN — NIFEDIPINE 90 MG: 30 TABLET, FILM COATED, EXTENDED RELEASE ORAL at 08:58

## 2019-01-01 RX ADMIN — PAROXETINE HYDROCHLORIDE HEMIHYDRATE 25 MG: 25 TABLET, FILM COATED, EXTENDED RELEASE ORAL at 08:45

## 2019-01-01 RX ADMIN — PHENYTOIN SODIUM 200 MG: 100 CAPSULE ORAL at 17:20

## 2019-01-01 RX ADMIN — NIFEDIPINE 60 MG: 30 TABLET, FILM COATED, EXTENDED RELEASE ORAL at 12:00

## 2019-01-01 RX ADMIN — LEVETIRACETAM 1000 MG: 500 TABLET, FILM COATED ORAL at 05:21

## 2019-01-01 RX ADMIN — PAROXETINE HYDROCHLORIDE HEMIHYDRATE 25 MG: 25 TABLET, FILM COATED, EXTENDED RELEASE ORAL at 08:58

## 2019-01-01 RX ADMIN — DONEPEZIL HYDROCHLORIDE 5 MG: 5 TABLET, FILM COATED ORAL at 08:57

## 2019-01-01 RX ADMIN — LEVETIRACETAM 1000 MG: 500 TABLET ORAL at 17:20

## 2019-01-01 RX ADMIN — LISINOPRIL 20 MG: 20 TABLET ORAL at 17:20

## 2019-01-01 RX ADMIN — PAROXETINE HYDROCHLORIDE HEMIHYDRATE 25 MG: 25 TABLET, FILM COATED, EXTENDED RELEASE ORAL at 08:02

## 2019-01-01 RX ADMIN — LORAZEPAM 0.5 MG: 0.5 TABLET ORAL at 21:10

## 2019-01-01 RX ADMIN — ATORVASTATIN CALCIUM 40 MG: 40 TABLET, FILM COATED ORAL at 21:59

## 2019-01-01 RX ADMIN — ATORVASTATIN CALCIUM 40 MG: 40 TABLET, FILM COATED ORAL at 21:10

## 2019-01-01 RX ADMIN — LISINOPRIL 40 MG: 20 TABLET ORAL at 08:31

## 2019-01-01 RX ADMIN — PAROXETINE HYDROCHLORIDE HEMIHYDRATE 25 MG: 25 TABLET, FILM COATED, EXTENDED RELEASE ORAL at 08:29

## 2019-01-01 RX ADMIN — TRAZODONE HYDROCHLORIDE 25 MG: 50 TABLET ORAL at 21:46

## 2019-01-01 RX ADMIN — ASPIRIN 81 MG 81 MG: 81 TABLET ORAL at 08:11

## 2019-01-01 RX ADMIN — ASPIRIN 81 MG 81 MG: 81 TABLET ORAL at 10:32

## 2019-01-01 RX ADMIN — LEVETIRACETAM 1000 MG: 500 TABLET, FILM COATED ORAL at 17:02

## 2019-01-01 RX ADMIN — LEVETIRACETAM 1000 MG: 500 TABLET, FILM COATED ORAL at 06:09

## 2019-01-01 RX ADMIN — ASPIRIN 81 MG 81 MG: 81 TABLET ORAL at 08:52

## 2019-01-01 RX ADMIN — MAGNESIUM GLUCONATE 500 MG ORAL TABLET 400 MG: 500 TABLET ORAL at 09:19

## 2019-01-01 RX ADMIN — Medication 10 ML: at 14:31

## 2019-01-01 RX ADMIN — LEVETIRACETAM 1000 MG: 500 TABLET, FILM COATED ORAL at 05:25

## 2019-01-01 RX ADMIN — PHENYTOIN SODIUM 200 MG: 100 CAPSULE ORAL at 17:41

## 2019-01-01 RX ADMIN — LEVETIRACETAM 1000 MG: 100 INJECTION, SOLUTION INTRAVENOUS at 15:17

## 2019-01-01 RX ADMIN — NIFEDIPINE 90 MG: 30 TABLET, FILM COATED, EXTENDED RELEASE ORAL at 20:49

## 2019-01-01 RX ADMIN — POTASSIUM CHLORIDE 20 MEQ: 20 TABLET, EXTENDED RELEASE ORAL at 13:03

## 2019-01-01 RX ADMIN — PHENYTOIN SODIUM 200 MG: 100 CAPSULE ORAL at 17:02

## 2019-01-01 RX ADMIN — LORAZEPAM 1 MG: 2 INJECTION INTRAMUSCULAR; INTRAVENOUS at 09:40

## 2019-01-01 RX ADMIN — HEPARIN SODIUM 5000 UNITS: 5000 INJECTION INTRAVENOUS; SUBCUTANEOUS at 17:19

## 2019-01-01 RX ADMIN — Medication 400 MG: at 23:53

## 2019-01-01 RX ADMIN — DONEPEZIL HYDROCHLORIDE 5 MG: 5 TABLET, FILM COATED ORAL at 08:27

## 2019-01-01 RX ADMIN — NIFEDIPINE 90 MG: 30 TABLET, FILM COATED, EXTENDED RELEASE ORAL at 21:54

## 2019-01-01 RX ADMIN — ASPIRIN 81 MG 81 MG: 81 TABLET ORAL at 09:00

## 2019-01-01 RX ADMIN — GUAIFENESIN 100 MG: 100 SOLUTION ORAL at 21:43

## 2019-01-01 RX ADMIN — MAGNESIUM GLUCONATE 500 MG ORAL TABLET 400 MG: 500 TABLET ORAL at 08:04

## 2019-01-01 RX ADMIN — ACETAMINOPHEN 650 MG: 325 TABLET, FILM COATED ORAL at 09:40

## 2019-01-01 RX ADMIN — DONEPEZIL HYDROCHLORIDE 5 MG: 5 TABLET, FILM COATED ORAL at 09:00

## 2019-01-01 RX ADMIN — TRAZODONE HYDROCHLORIDE 25 MG: 50 TABLET ORAL at 20:41

## 2019-01-01 RX ADMIN — HEPARIN SODIUM 5000 UNITS: 5000 INJECTION INTRAVENOUS; SUBCUTANEOUS at 21:21

## 2019-01-01 RX ADMIN — MAGNESIUM GLUCONATE 500 MG ORAL TABLET 400 MG: 500 TABLET ORAL at 08:34

## 2019-01-01 RX ADMIN — HEPARIN SODIUM 5000 UNITS: 5000 INJECTION INTRAVENOUS; SUBCUTANEOUS at 05:42

## 2019-01-01 RX ADMIN — Medication 10 ML: at 14:02

## 2019-01-01 RX ADMIN — LISINOPRIL 20 MG: 20 TABLET ORAL at 17:31

## 2019-01-01 RX ADMIN — HEPARIN SODIUM 5000 UNITS: 5000 INJECTION INTRAVENOUS; SUBCUTANEOUS at 14:03

## 2019-01-01 RX ADMIN — TRAZODONE HYDROCHLORIDE 25 MG: 50 TABLET ORAL at 21:54

## 2019-01-01 RX ADMIN — PAROXETINE HYDROCHLORIDE HEMIHYDRATE 25 MG: 25 TABLET, FILM COATED, EXTENDED RELEASE ORAL at 08:04

## 2019-01-01 RX ADMIN — HYDROCHLOROTHIAZIDE 25 MG: 25 TABLET ORAL at 08:31

## 2019-01-01 RX ADMIN — POTASSIUM CHLORIDE 20 MEQ: 20 TABLET, EXTENDED RELEASE ORAL at 09:26

## 2019-01-01 RX ADMIN — ASPIRIN 81 MG 81 MG: 81 TABLET ORAL at 17:00

## 2019-01-01 RX ADMIN — Medication 5 ML: at 21:58

## 2019-01-01 RX ADMIN — LISINOPRIL 40 MG: 20 TABLET ORAL at 09:00

## 2019-01-01 RX ADMIN — ASPIRIN 81 MG 81 MG: 81 TABLET ORAL at 08:39

## 2019-01-01 RX ADMIN — Medication 10 ML: at 05:15

## 2019-01-01 RX ADMIN — ATORVASTATIN CALCIUM 40 MG: 40 TABLET, FILM COATED ORAL at 21:57

## 2019-01-01 RX ADMIN — DONEPEZIL HYDROCHLORIDE 5 MG: 5 TABLET, FILM COATED ORAL at 08:38

## 2019-01-01 RX ADMIN — PAROXETINE HYDROCHLORIDE HEMIHYDRATE 25 MG: 25 TABLET, FILM COATED, EXTENDED RELEASE ORAL at 10:35

## 2019-01-01 RX ADMIN — NIFEDIPINE 60 MG: 30 TABLET, EXTENDED RELEASE ORAL at 08:59

## 2019-01-01 RX ADMIN — PHENYTOIN SODIUM 200 MG: 100 CAPSULE ORAL at 08:30

## 2019-01-01 RX ADMIN — PHENYTOIN SODIUM 200 MG: 100 CAPSULE ORAL at 17:42

## 2019-01-01 RX ADMIN — HEPARIN SODIUM 5000 UNITS: 5000 INJECTION INTRAVENOUS; SUBCUTANEOUS at 13:48

## 2019-01-01 RX ADMIN — MAGNESIUM GLUCONATE 500 MG ORAL TABLET 400 MG: 500 TABLET ORAL at 16:56

## 2019-01-01 RX ADMIN — DONEPEZIL HYDROCHLORIDE 5 MG: 5 TABLET, FILM COATED ORAL at 08:32

## 2019-01-01 RX ADMIN — Medication 5 ML: at 22:08

## 2019-01-01 RX ADMIN — LISINOPRIL 20 MG: 20 TABLET ORAL at 17:00

## 2019-01-01 RX ADMIN — ACETAMINOPHEN 650 MG: 325 TABLET, FILM COATED ORAL at 19:50

## 2019-01-01 RX ADMIN — ASPIRIN 81 MG 81 MG: 81 TABLET ORAL at 08:33

## 2019-01-01 RX ADMIN — ESOMEPRAZOLE MAGNESIUM 40 MG: 40 CAPSULE, DELAYED RELEASE ORAL at 09:01

## 2019-01-01 RX ADMIN — ATORVASTATIN CALCIUM 40 MG: 40 TABLET, FILM COATED ORAL at 21:12

## 2019-01-01 RX ADMIN — DONEPEZIL HYDROCHLORIDE 5 MG: 5 TABLET, FILM COATED ORAL at 08:52

## 2019-01-01 RX ADMIN — HEPARIN SODIUM 5000 UNITS: 5000 INJECTION INTRAVENOUS; SUBCUTANEOUS at 09:03

## 2019-01-01 RX ADMIN — NIFEDIPINE 60 MG: 30 TABLET, FILM COATED, EXTENDED RELEASE ORAL at 08:12

## 2019-01-01 RX ADMIN — GUAIFENESIN 100 MG: 100 SOLUTION ORAL at 16:50

## 2019-01-01 RX ADMIN — HEPARIN SODIUM 5000 UNITS: 5000 INJECTION INTRAVENOUS; SUBCUTANEOUS at 05:57

## 2019-01-01 RX ADMIN — MAGNESIUM GLUCONATE 500 MG ORAL TABLET 400 MG: 500 TABLET ORAL at 21:37

## 2019-01-01 RX ADMIN — LISINOPRIL 20 MG: 20 TABLET ORAL at 10:32

## 2019-01-01 RX ADMIN — HEPARIN SODIUM 5000 UNITS: 5000 INJECTION INTRAVENOUS; SUBCUTANEOUS at 09:24

## 2019-01-01 RX ADMIN — HYDROCHLOROTHIAZIDE 25 MG: 25 TABLET ORAL at 09:06

## 2019-01-01 RX ADMIN — MAGNESIUM GLUCONATE 500 MG ORAL TABLET 400 MG: 500 TABLET ORAL at 16:57

## 2019-01-01 RX ADMIN — DONEPEZIL HYDROCHLORIDE 5 MG: 5 TABLET, FILM COATED ORAL at 08:30

## 2019-01-01 RX ADMIN — DONEPEZIL HYDROCHLORIDE 5 MG: 5 TABLET, FILM COATED ORAL at 08:04

## 2019-01-01 RX ADMIN — PHENYTOIN SODIUM 200 MG: 100 CAPSULE ORAL at 08:03

## 2019-01-01 RX ADMIN — ATORVASTATIN CALCIUM 40 MG: 40 TABLET, FILM COATED ORAL at 19:39

## 2019-01-01 RX ADMIN — MAGNESIUM GLUCONATE 500 MG ORAL TABLET 400 MG: 500 TABLET ORAL at 21:46

## 2019-01-01 RX ADMIN — PHENYTOIN SODIUM 200 MG: 100 CAPSULE ORAL at 17:13

## 2019-01-01 RX ADMIN — LISINOPRIL 20 MG: 20 TABLET ORAL at 17:02

## 2019-01-01 RX ADMIN — PHENYTOIN SODIUM 200 MG: 100 CAPSULE ORAL at 09:18

## 2019-01-01 RX ADMIN — MAGNESIUM GLUCONATE 500 MG ORAL TABLET 400 MG: 500 TABLET ORAL at 20:41

## 2019-01-01 RX ADMIN — NIFEDIPINE 60 MG: 30 TABLET, FILM COATED, EXTENDED RELEASE ORAL at 09:00

## 2019-01-01 RX ADMIN — TRAZODONE HYDROCHLORIDE 25 MG: 50 TABLET ORAL at 21:59

## 2019-01-01 RX ADMIN — CIPROFLOXACIN HYDROCHLORIDE 250 MG: 500 TABLET, FILM COATED ORAL at 21:18

## 2019-01-01 RX ADMIN — Medication 400 MG: at 09:26

## 2019-01-01 RX ADMIN — ATORVASTATIN CALCIUM 40 MG: 40 TABLET, FILM COATED ORAL at 21:46

## 2019-01-01 RX ADMIN — TRAZODONE HYDROCHLORIDE 25 MG: 50 TABLET ORAL at 21:18

## 2019-01-01 RX ADMIN — HEPARIN SODIUM 5000 UNITS: 5000 INJECTION INTRAVENOUS; SUBCUTANEOUS at 18:40

## 2019-01-01 RX ADMIN — LORAZEPAM 0.5 MG: 0.5 TABLET ORAL at 18:51

## 2019-01-01 RX ADMIN — LEVETIRACETAM 1000 MG: 500 TABLET, FILM COATED ORAL at 06:24

## 2019-01-01 RX ADMIN — LISINOPRIL 40 MG: 20 TABLET ORAL at 09:06

## 2019-01-01 RX ADMIN — DONEPEZIL HYDROCHLORIDE 5 MG: 5 TABLET, FILM COATED ORAL at 08:34

## 2019-01-01 RX ADMIN — MAGNESIUM GLUCONATE 500 MG ORAL TABLET 400 MG: 500 TABLET ORAL at 08:38

## 2019-01-01 RX ADMIN — HEPARIN SODIUM 5000 UNITS: 5000 INJECTION INTRAVENOUS; SUBCUTANEOUS at 20:41

## 2019-01-01 RX ADMIN — Medication 5 ML: at 06:18

## 2019-01-01 RX ADMIN — NIFEDIPINE 60 MG: 30 TABLET, FILM COATED, EXTENDED RELEASE ORAL at 17:14

## 2019-01-01 RX ADMIN — TRAZODONE HYDROCHLORIDE 25 MG: 50 TABLET ORAL at 21:38

## 2019-01-01 RX ADMIN — LEVETIRACETAM 1000 MG: 500 TABLET, FILM COATED ORAL at 05:28

## 2019-01-01 RX ADMIN — ASPIRIN 81 MG 81 MG: 81 TABLET ORAL at 08:27

## 2019-01-01 RX ADMIN — PAROXETINE HYDROCHLORIDE HEMIHYDRATE 25 MG: 25 TABLET, FILM COATED, EXTENDED RELEASE ORAL at 09:29

## 2019-01-01 RX ADMIN — LISINOPRIL 40 MG: 20 TABLET ORAL at 17:17

## 2019-01-01 RX ADMIN — MAGNESIUM GLUCONATE 500 MG ORAL TABLET 400 MG: 500 TABLET ORAL at 17:29

## 2019-09-26 PROBLEM — I25.10 CAD (CORONARY ARTERY DISEASE): Status: ACTIVE | Noted: 2019-01-01

## 2019-09-26 PROBLEM — R56.9 SEIZURES (HCC): Status: ACTIVE | Noted: 2019-01-01

## 2019-09-26 PROBLEM — G45.9 TIA (TRANSIENT ISCHEMIC ATTACK): Status: ACTIVE | Noted: 2019-01-01

## 2019-09-26 PROBLEM — I10 HTN (HYPERTENSION): Status: ACTIVE | Noted: 2019-01-01

## 2019-09-26 PROBLEM — Z85.46 HISTORY OF PROSTATE CANCER: Status: ACTIVE | Noted: 2019-01-01

## 2019-09-26 PROBLEM — Z86.73 HISTORY OF CVA (CEREBROVASCULAR ACCIDENT): Status: ACTIVE | Noted: 2019-01-01

## 2019-09-26 PROBLEM — E78.5 HLD (HYPERLIPIDEMIA): Status: ACTIVE | Noted: 2019-01-01

## 2019-09-26 PROBLEM — Z90.79 HISTORY OF PROSTATECTOMY: Status: ACTIVE | Noted: 2019-01-01

## 2019-09-26 NOTE — PROGRESS NOTES
Location of Assessment: ER RM8 Socioevironmental: 
SW met with patient who referred to his wife to answer most questions. Patient's wife states that they moved to [place] from [place] a few months ago. The couple have an adult son who lives locally. Patient currently on O2 in his room, states that he does not use at home. Ambulation/ADLs: 
Patient's wife states that he uses a cane to walk when he's feeling weaker or going far distances. The patient fell twice yesterday and is usually independent at his baseline without falls. Primary Care: 
Patient does not have a PCP locally, but is receptive to SW sending a referral to [facility]. Needs:  
Patient anticipated to admit for TIA/CVA workup. CASSI/CM dept to continue to follow for needs following additional workup. ZACAHRY Escobedo  [address] [email or web address]

## 2019-09-26 NOTE — PROGRESS NOTES
TRANSFER - IN REPORT: 
 
Verbal report received from Alice Morris RN (name) on Fernandez Jm  being received from ER (unit) for routine progression of care Report consisted of patients Situation, Background, Assessment and  
Recommendations(SBAR). Information from the following report(s) SBAR, Kardex, ED Summary, Intake/Output, MAR and Recent Results was reviewed with the receiving nurse. Opportunity for questions and clarification was provided. Assessment completed upon patients arrival to unit and care assumed.

## 2019-09-26 NOTE — PROGRESS NOTES
Patient has a new patient appointment with Dr. Dorys Lyon [facility] Thursday, 10/3 at 3:00 PM 
 
Norma Guillory, [address] [facility]  [address] [email or web address]

## 2019-09-26 NOTE — ED NOTES
TRANSFER - OUT REPORT: 
 
Verbal report given to AURORA Morales.  on Mary Anne Davidson  being transferred to [id number] for routine progression of care Report consisted of patients Situation, Background, Assessment and  
Recommendations(SBAR). Information from the following report(s) SBAR, ED Summary, Intake/Output, MAR, Recent Results and Cardiac Rhythm NSR was reviewed with the receiving nurse. Lines:  
Peripheral IV 09/26/19 Right Antecubital (Active) Site Assessment Clean, dry, & intact 9/26/2019 12:59 PM  
Phlebitis Assessment 0 9/26/2019 12:59 PM  
Infiltration Assessment 0 9/26/2019 12:59 PM  
Dressing Status Clean, dry, & intact 9/26/2019 12:59 PM  
Dressing Type Tape;Transparent 9/26/2019 12:59 PM  
Hub Color/Line Status Pink 9/26/2019 12:59 PM  
Action Taken Blood drawn 9/26/2019 12:59 PM  
  
 
Opportunity for questions and clarification was provided. Patient transported with: 
 [employer]

## 2019-09-26 NOTE — H&P
HOSPITALIST H&P/CONSULT 
NAME:  Mary Anne Davidson Age:  66 y.o. 
:   1945 MRN:   [id number] PCP: None Consulting MD: Treatment Team: Attending Provider: Julieth Little MD; : Michael Wagner 
HPI:  
Patient is a 72yo M with hx R frontal ICH, seizures, and HTN who presents with worsening left leg weakness and confusion for one day. Pt reports feeling not right yesterday afternoon, followed by leg weakness and difficulty walking. From prior stroke, has some very mild deficits in leg coordination but wife reports strength normal at baseline. Communicates fluently at baseline. Has seizure disorder since ICH and takes keppra. Also with hx HTN, HLD. On atorvastatin and antihypertensives. Stopped taking ASA due to  bleeding after prostatectomy and radiation for prostate CA. At worst this morning, left leg was nearly flacid, now improving in strength since presentation. Has also had improvement in mental status but still responds inappropriately to some questions. Complete ROS done and is as stated in HPI or otherwise negative Past Medical History:  
Diagnosis Date  Arthritis  CAD (coronary artery disease)  Hypertension  Seizures ([address]  Stroke [facility])   
 prostate cancer Duodenal ulcer Past Surgical History:  
Procedure Laterality Date  CARDIAC SURG PROCEDURE UNLIST    
 one stent  HX ORTHOPAEDIC    
 knee surgeries  
 urologic procedure None No Known Allergies Social History Tobacco Use  Smoking status: Never Smoker  Smokeless tobacco: Never Used Substance Use Topics  Alcohol use: Not Currently History reviewed. No pertinent family history. +heart disease Objective:  
 
Visit Vitals /89 (BP 1 Location: Left arm, BP Patient Position: At rest) Pulse 66 Temp (!) 100.5 °F (38.1 °C) Resp 20 Ht 5' 10\" (1.778 m) Wt 83.3 kg (183 lb 11.2 oz) SpO2 100% BMI 26.36 kg/m² Temp (24hrs), Av °F (37.8 °C), Min:99.1 °F (37.3 °C), Max:100.5 °F (38.1 °C) Oxygen Therapy O2 Sat (%): 100 % (19) O2 Device: Nasal cannula (19) O2 Flow Rate (L/min): 2 l/min (19) Physical Exam: 
General:    Alert, cooperative, no distress, appears stated age. Head:   Normocephalic, without obvious abnormality, atraumatic. Nose:  Nares normal. No drainage or sinus tenderness. Lungs:   Clear to auscultation bilaterally. No Wheezing or Rhonchi. No rales. Heart:   Regular rate and rhythm,  no murmur, rub or gallop. Abdomen:   Soft, non-tender. Not distended. Bowel sounds normal.  
Extremities: No cyanosis. No edema. No clubbing Skin:     Texture, turgor normal. No rashes or lesions. Not Jaundiced Neurologic: Alert, oriented x 3, strength 4/5 in LLE, full elsewhere. Follows commands but occasionally not responding appropriately Data Review:  
Recent Results (from the past 24 hour(s)) CBC WITH AUTOMATED DIFF Collection Time: 19  1:07 PM  
Result Value Ref Range WBC 6.3 4.3 - 11.1 K/uL  
 RBC 4.71 4.23 - 5.6 M/uL  
 HGB 12.6 (L) 13.6 - 17.2 g/dL HCT 40.3 (L) 41.1 - 50.3 % MCV 85.6 79.6 - 97.8 FL  
 MCH 26.8 26.1 - 32.9 PG  
 MCHC 31.3 (L) 31.4 - 35.0 g/dL  
 RDW 14.9 (H) 11.9 - 14.6 % PLATELET 455 040 - 871 K/uL MPV 9.3 (L) 9.4 - 12.3 FL ABSOLUTE NRBC 0.00 0.0 - 0.2 K/uL  
 DF AUTOMATED NEUTROPHILS 88 (H) 43 - 78 % LYMPHOCYTES 7 (L) 13 - 44 % MONOCYTES 5 4.0 - 12.0 % EOSINOPHILS 0 (L) 0.5 - 7.8 % BASOPHILS 0 0.0 - 2.0 % IMMATURE GRANULOCYTES 0 0.0 - 5.0 %  
 ABS. NEUTROPHILS 5.5 1.7 - 8.2 K/UL  
 ABS. LYMPHOCYTES 0.5 0.5 - 4.6 K/UL  
 ABS. MONOCYTES 0.3 0.1 - 1.3 K/UL  
 ABS. EOSINOPHILS 0.0 0.0 - 0.8 K/UL  
 ABS. BASOPHILS 0.0 0.0 - 0.2 K/UL  
 ABS. IMM. GRANS. 0.0 0.0 - 0.5 K/UL METABOLIC PANEL, COMPREHENSIVE Collection Time: 19  1:07 PM  
Result Value Ref Range  Sodium 142 136 - 145 mmol/L  
 Potassium 3.3 (L) 3.5 - 5.1 mmol/L Chloride 103 98 - 107 mmol/L  
 CO2 33 (H) 21 - 32 mmol/L Anion gap 6 (L) 7 - 16 mmol/L Glucose 90 65 - 100 mg/dL BUN 17 8 - 23 MG/DL Creatinine 1.11 0.8 - 1.5 MG/DL  
 GFR est AA >60 >60 ml/min/1.73m2 GFR est non-AA >60 >60 ml/min/1.73m2 Calcium 9.2 8.3 - 10.4 MG/DL Bilirubin, total 0.5 0.2 - 1.1 MG/DL  
 ALT (SGPT) 16 12 - 65 U/L  
 AST (SGOT) 19 15 - 37 U/L Alk. phosphatase 126 50 - 136 U/L Protein, total 7.7 6.3 - 8.2 g/dL Albumin 3.6 3.2 - 4.6 g/dL Globulin 4.1 (H) 2.3 - 3.5 g/dL A-G Ratio 0.9 (L) 1.2 - 3.5 GLUCOSE, POC Collection Time: 09/26/19  1:08 PM  
Result Value Ref Range Glucose (POC) 88 65 - 100 mg/dL POC PT/INR Collection Time: 09/26/19  1:10 PM  
Result Value Ref Range Prothrombin time (POC) 13.9 (H) 9.6 - 11.6 SECS  
 INR (POC) 1.2 0.9 - 1.2 POC LACTIC ACID Collection Time: 09/26/19  1:12 PM  
Result Value Ref Range Lactic Acid (POC) 0.72 0.5 - 1.9 mmol/L Imaging [facility] Porfirio Rossi XR CHEST SNGL V Final Result IMPRESSION: Unremarkable portable chest x-ray. No acute abnormality evident. CT CODE NEURO HEAD WO CONTRAST Final Result IMPRESSION:  
1. No acute intracranial abnormality. Note, acute/subacute CVA cannot be  
excluded given the severity of the underlying white matter disease. Consider  
further evaluation with brain MRI to better evaluate for acute CVA. 2. Severe chronic white matter microangiopathic disease and cerebral atrophy. 3. Encephalomalacia in the right frontal lobe most in keeping with prior CVA. Assessment and Plan: Active Hospital Problems Diagnosis Date Noted  TIA (transient ischemic attack) 09/26/2019  Seizures ([address] 09/26/2019  
 History of CVA (cerebrovascular accident) 09/26/2019  
 HTN (hypertension) 09/26/2019  HLD (hyperlipidemia) 09/26/2019  CAD (coronary artery disease) 09/26/2019  History of prostatectomy 09/26/2019  
 History of prostate cancer 09/26/2019 PLAN: 
Leg weakness, AMS: CVA vs seizure activity MRI, CTA pending S/p teleneuro consult: loaded with keppra Continue home keppra 1000 bid Consider EEG if continues ams 
continue home lipitor, restart ASA (prior discontinued due to bladder bleeding related to radiation) Pt/ot/ppd Hypokalemia: replace, monitor HTN: hold home nifedipine and lisinopril, DVT PPx: HSQ Code Status: full Anticipated discharge: less than two midnights Signed By: Heather Garcia MD   
 September 26, 2019

## 2019-09-26 NOTE — ED PROVIDER NOTES
Arleen Melvin is a 66 y.o. male who presents to the ED with a chief complaint of generalized weakness and falls. His symptoms started yesterday at about 430. He does have a history of a hemorrhagic stroke in the past and minimal left-sided weakness at baseline. Within the last day he is fallen twice in his relatives reported that he was weak on both sides when they were getting out of the car today they felt that he was more weak on the left leg then usual.  He also has had a history of seizures following the hemorrhagic stroke and does take Keppra 500 twice daily. Patient states that when he fell he was unable to get up due to the weakness. Past Medical History:  
Diagnosis Date  Arthritis  CAD (coronary artery disease)  Hypertension  Seizures ([address]  [address] [address] Past Surgical History:  
Procedure Laterality Date  CARDIAC SURG PROCEDURE UNLIST    
 one stent  HX ORTHOPAEDIC    
 knee surgeries History reviewed. No pertinent family history. Social History Socioeconomic History  Marital status: Not on file Spouse name: Not on file  Number of children: Not on file  Years of education: Not on file  Highest education level: Not on file Occupational History  Not on file Social Needs  Financial resource strain: Not on file  Food insecurity:  
  Worry: Not on file Inability: Not on file  Transportation needs:  
  Medical: Not on file Non-medical: Not on file Tobacco Use  Smoking status: Never Smoker  Smokeless tobacco: Never Used Substance and Sexual Activity  Alcohol use: Not Currently  Drug use: Never  Sexual activity: Not on file Lifestyle  Physical activity:  
  Days per week: Not on file Minutes per session: Not on file  Stress: Not on file Relationships  Social connections:  
  Talks on phone: Not on file Gets together: Not on file Attends Taoist service: Not on file Active member of club or organization: Not on file Attends meetings of clubs or organizations: Not on file Relationship status: Not on file  Intimate partner violence:  
  Fear of current or ex partner: Not on file Emotionally abused: Not on file Physically abused: Not on file Forced sexual activity: Not on file Other Topics Concern  Not on file Social History Narrative  Not on file ALLERGIES: Patient has no known allergies. Review of Systems Constitutional: Positive for fatigue. Negative for chills and fever. Respiratory: Negative for cough, chest tightness, shortness of breath, wheezing and stridor. Cardiovascular: Negative for chest pain and palpitations. Gastrointestinal: Negative for abdominal pain, diarrhea, nausea and vomiting. Musculoskeletal: Negative for arthralgias, neck pain and neck stiffness. Skin: Negative. Negative for color change, pallor, rash and wound. Neurological: Positive for weakness. Negative for light-headedness, numbness and headaches. All other systems reviewed and are negative. Vitals:  
 09/26/19 1249 BP: 172/64 Pulse: 75 Resp: 20 Temp: 99.1 °F (37.3 °C) SpO2: 92% Weight: 83.3 kg (183 lb 11.2 oz) Height: 5' 10\" (1.778 m) Physical Exam  
Constitutional: He appears well-developed and well-nourished. Non-toxic appearance. He does not appear ill. No distress. HENT:  
Head: Normocephalic and atraumatic. Mouth/Throat: Oropharynx is clear and moist.  
Eyes: Pupils are equal, round, and reactive to light. Conjunctivae and EOM are normal. No scleral icterus. Right pupil is round and reactive. Left pupil is round and reactive. Neck: No tracheal deviation present. Cardiovascular: Normal rate, regular rhythm and normal heart sounds. Pulmonary/Chest: Effort normal. No respiratory distress. Abdominal: Soft. There is no tenderness. Neurological: He is alert. GCS eye subscore is 4. GCS verbal subscore is 5. GCS motor subscore is 6. Skin: Skin is warm. Capillary refill takes less than 2 seconds. No rash noted. He is not diaphoretic. No cyanosis or erythema. Psychiatric: He has a normal mood and affect. His behavior is normal.  
Nursing note and vitals reviewed. MDM Number of Diagnoses or Management Options Diagnosis management comments: I spoke with neurology who evaluated patient. No indications for TPA as patient is outside the window. Neurology believes he could be having seizure and postictal state, stroke, infection, or metabolic etiology. They do recommend bolusing patient with 1000 of Keppra and admitting for stroke evaluation. Theodora Delgadillo MD; 9/26/2019 @1:48 PM Voice dictation software was used during the making of this note. This software is not perfect and grammatical and other typographical errors may be present. This note has not been proofread for errors. 
=================================================================== Amount and/or Complexity of Data Reviewed Clinical lab tests: ordered and reviewed Tests in the radiology section of CPT®: ordered and reviewed Discuss the patient with other providers: yes Independent visualization of images, tracings, or specimens: yes Procedures

## 2019-09-26 NOTE — ED TRIAGE NOTES
Pt hx of hemorrhagic stroke in right frontal lobe. Pt had recovered from this stroke with residual weakness on left side. He has fallen twice in the last 24 hours. He was c/o extreme back pain yesterday. He is incontinent and wears a brief. Pt has been weak and does not appear to be himself. There is question of new left sided weakness.

## 2019-09-26 NOTE — PROGRESS NOTES
Nurse aware patient will not be monitored during MRI exam. Patient stable per nurse for 30 minute scan.

## 2019-09-26 NOTE — PROGRESS NOTES
09/26/19 1552 Dual Skin Pressure Injury Assessment Dual Skin Pressure Injury Assessment WDL Second Care Provider (Based on Facility Policy) DESTINI Cochran Skin Integumentary Skin Integumentary (WDL) WDL Pressure  Injury Documentation No Pressure Injury Noted-Pressure Ulcer Prevention Initiated

## 2019-09-26 NOTE — PROGRESS NOTES
Admission assessment completed. Pt is alert and oriented x 3 is very hard of hearing even though he has bilateral hearing aides. Verbalizes needs well. Normally uses a cane due to previous weakness related to an old stroke. Did NIH with ER Nurse, Skin warm and dry. . Wife at bedside. Fall precautions are in place.

## 2019-09-27 NOTE — PROGRESS NOTES
Patient with a MEWS of 3. Patient resting in bed with eyes closed, no s/s pain or distress noted. Primary RN will continue to monitor.

## 2019-09-27 NOTE — PROGRESS NOTES
Care Management Interventions Mode of Transport at Discharge: Other (see comment) Transition of Care Consult (CM Consult): Other Current Support Network: Lives with Spouse Confirm Follow Up Transport: Family Plan discussed with Pt/Family/Caregiver: Yes Freedom of Choice Offered: Yes Discharge Location Discharge Placement: Unable to determine at this time. Visited with pt regarding plans for discharge, pt lives at home with spouse, recently moved form [place], has a son close by, inde at home with a cane \"if needed\". Has a new PCP appt, PT/OT/PPD orders placed. Will continue to follow. Consulted DR. Stiven Olmstead Physician about changing pt to \"inpatient status\" and pt does not qualify at the moment. Saw pt in interdisciplinarily rounds with the following disciplines: Physician, Case Management, Nursing, Dietary,Therapy and Pharmacy. The plan of care was discussed along with discharge date/ location.  Neuro consult placed per Hospitalist.

## 2019-09-27 NOTE — PROGRESS NOTES
Family at bedside. Patient resting in bed. Respirations present, non-labored. 2 LPM oxygen via NC. Left leg weakness. PIV intact and patent. Safety measures in place. Will continue to monitor.

## 2019-09-27 NOTE — PROGRESS NOTES
Problem: Patient Education: Go to Patient Education Activity Goal: Patient/Family Education Outcome: Progressing Towards Goal 
  
Problem: TIA/CVA Stroke: 0-24 hours Goal: Off Pathway (Use only if patient is Off Pathway) Outcome: Progressing Towards Goal 
Goal: Activity/Safety Outcome: Progressing Towards Goal 
Goal: Consults, if ordered Outcome: Progressing Towards Goal 
Goal: Diagnostic Test/Procedures Outcome: Progressing Towards Goal 
Goal: Nutrition/Diet Outcome: Progressing Towards Goal 
Goal: Discharge Planning Outcome: Progressing Towards Goal 
Goal: Medications Outcome: Progressing Towards Goal 
Goal: Respiratory Outcome: Progressing Towards Goal 
Goal: Treatments/Interventions/Procedures Outcome: Progressing Towards Goal 
Goal: Minimize risk of bleeding post-thrombolytic infusion Outcome: Progressing Towards Goal 
Goal: Monitor for complications post-thrombolytic infusion Outcome: Progressing Towards Goal 
Goal: Psychosocial 
Outcome: Progressing Towards Goal 
Goal: *Hemodynamically stable Outcome: Progressing Towards Goal 
Goal: *Neurologically stable Description Absence of additional neurological deficits Outcome: Progressing Towards Goal 
Goal: *Verbalizes anxiety and depression are reduced or absent Outcome: Progressing Towards Goal 
Goal: *Absence of Signs of Aspiration on Current Diet Outcome: Progressing Towards Goal 
Goal: *Absence of deep venous thrombosis signs and symptoms(Stroke Metric) Outcome: Progressing Towards Goal 
Goal: *Ability to perform ADLs and demonstrates progressive mobility and function Outcome: Progressing Towards Goal 
Goal: *Stroke education started(Stroke Metric) Outcome: Progressing Towards Goal 
Goal: *Dysphagia screen performed(Stroke Metric) Outcome: Progressing Towards Goal 
Goal: *Rehab consulted(Stroke Metric) Outcome: Progressing Towards Goal 
  
Problem: TIA/CVA Stroke: Day 2 Until Discharge Goal: Off Pathway (Use only if patient is Off Pathway) Outcome: Progressing Towards Goal 
Goal: Activity/Safety Outcome: Progressing Towards Goal 
Goal: Diagnostic Test/Procedures Outcome: Progressing Towards Goal 
Goal: Nutrition/Diet Outcome: Progressing Towards Goal 
Goal: Discharge Planning Outcome: Progressing Towards Goal 
Goal: Medications Outcome: Progressing Towards Goal 
Goal: Respiratory Outcome: Progressing Towards Goal 
Goal: Treatments/Interventions/Procedures Outcome: Progressing Towards Goal 
Goal: Psychosocial 
Outcome: Progressing Towards Goal 
Goal: *Verbalizes anxiety and depression are reduced or absent Outcome: Progressing Towards Goal 
Goal: *Absence of aspiration Outcome: Progressing Towards Goal 
Goal: *Absence of deep venous thrombosis signs and symptoms(Stroke Metric) Outcome: Progressing Towards Goal 
Goal: *Optimal pain control at patient's stated goal 
Outcome: Progressing Towards Goal 
Goal: *Tolerating diet Outcome: Progressing Towards Goal 
Goal: *Ability to perform ADLs and demonstrates progressive mobility and function Outcome: Progressing Towards Goal 
Goal: *Stroke education continued(Stroke Metric) Outcome: Progressing Towards Goal

## 2019-09-27 NOTE — PROGRESS NOTES
Hospitalist Progress Note 2019 Admit Date: 2019  1:05 PM  
NAME: Arleen Melvin :  1945 MRN:  [id number] Attending: Antonio Koenig MD 
PCP:  Matt Ray MD 
 
SUBJECTIVE:  
Patient is a 72yo M with hx HTN and R frontal ICH with subsequent seizure disorder who presented with worsening L leg weakness and confusion for one day. Has mild leg weakness and cognitive deficits after prior stroke. Takes keppra for seizure disorder. Not on asa at home due to bleeding from bladder after radiation/prostatectomy for prostate CA. 
 
: mentally much more clear, at baseline per son. Did have an episode of grimacing and leg pain last night which improved with ativan. Stroke workup so far negative for acute change. Echo pending still. Review of Systems negative with exception of pertinent positives noted above PHYSICAL EXAM  
 
Visit Vitals /83 (BP 1 Location: Right arm, BP Patient Position: At rest) Comment: nurse notified Pulse 64 Temp 100.1 °F (37.8 °C) Resp 16 Ht 5' 10\" (1.778 m) Wt 83.3 kg (183 lb 11.2 oz) SpO2 100% BMI 26.36 kg/m² Temp (24hrs), Av.9 °F (37.7 °C), Min:99 °F (37.2 °C), Max:100.5 °F (38.1 °C) Oxygen Therapy O2 Sat (%): 100 % (19 0740) O2 Device: Nasal cannula (19) O2 Flow Rate (L/min): 2 l/min (19) No intake or output data in the 24 hours ending 19 1043 General: No acute distress Lungs:  CTA Bilaterally. Heart:  Regular rate and rhythm,  No murmur, rub, or gallop Abdomen: Soft, Non distended, Non tender, Positive bowel sounds Extremities: No cyanosis, clubbing or edema Neurologic:  Alert, hard of hearing, answers questions appropriately, fully oriented. Slight weakness in left leg at baseline per patient. XR HIP LT W OR WO PELV 2-3 VWS Final Result IMPRESSION: Negative for acute fracture. MRI BRAIN WO CONT Final Result IMPRESSION: Remote right frontal lobe infarct with encephalomalacia and  
hemosiderin staining. Extensive white matter changes, likely chronic small  
vessel disease. No acute infarction. CTA HEAD NECK W WO CONT Final Result Impression: 1. No evidence of intracranial large vessel occlusion. 2. No significant ICA stenosis however there is some mildly irregular soft  
plaque in the proximal right ICA. [place] [facility] XR CHEST SNGL V Final Result IMPRESSION: Unremarkable portable chest x-ray. No acute abnormality evident. CT CODE NEURO HEAD WO CONTRAST Final Result IMPRESSION:  
1. No acute intracranial abnormality. Note, acute/subacute CVA cannot be  
excluded given the severity of the underlying white matter disease. Consider  
further evaluation with brain MRI to better evaluate for acute CVA. 2. Severe chronic white matter microangiopathic disease and cerebral atrophy. 3. Encephalomalacia in the right frontal lobe most in keeping with prior CVA. ASSESSMENT Active Hospital Problems Diagnosis Date Noted  TIA (transient ischemic attack) 09/26/2019  Seizures ([facility] [address] 09/26/2019  
 History of CVA (cerebrovascular accident) 09/26/2019  
 HTN (hypertension) 09/26/2019  HLD (hyperlipidemia) 09/26/2019  CAD (coronary artery disease) 09/26/2019  
 History of prostatectomy 09/26/2019  
 History of prostate cancer 09/26/2019 Plan: 
 
Leg weakness, AMS: CVA vs seizure activity MRI, CTA without acute findings S/p teleneuro consult: loaded with keppra Continue home keppra 1000 bid, consider increase, but will defer to neurology 
continue home lipitor, restart ASA (prior discontinued due to bladder bleeding related to radiation) Symptoms seem much improved but pt still not out of bed and concerned about ability to ambulate. Will work with PT. Could be breakthrough seizure activity now resolved. 
  
Hypokalemia: replace, monitor HTN: restart home nifedipine and lisinopril,  
  
 
DVT Prophylaxis: HSQ Dispo: pending pt and neuro evals Signed By: David Laureano MD   
 September 27, 2019

## 2019-09-27 NOTE — PROGRESS NOTES
Nutrition Reason for assessment: BPA - Malnutrition Screening Tool positive for unintended weight loss. Recently Lost Weight Without Trying: Yes If Yes, How Much Weight Loss: >33 lbs Eating Poorly Due to Decreased Appetite: Yes Assessment:  
Food/Nutrition Patient History:  Pt admitted with AMS: CVA vs seizure activity, hypokalemia, HTN. PMH remarkable for  Prostate ca, duodenal ulcer, CAD, HTN, CVA, seizures. Patient is oriented to person at RD visit, place \"hospital.\"  He is slow to respond to questions and easily distracted by the television. He presents with limited recall of reported weight loss, po history. He is able to tell me he usually only eats dinner and that he prefers strawberry supplements. Discussed with wife Diamond via phone secondary to no records available in EMR for review. She reports they recently relocated here. She recalls history of weight loss starting after stroke in May, 2018. She indicates since this time he has limited cues for hunger and will say he doesn't want anything if asked about eating. She indicates when food is placed in front of him he \"crams it in like a child who hasn't been fed\" referring to dinner meal.  She reports if he is given items during the day he generally will not eat them unless they are of the sweet variety. He drinks an ensure or boost daily during the day. When they go out to eat he always \"goes straight to the dessert items. \"   
 
DIET CARDIAC Regular DIET NUTRITIONAL SUPPLEMENTS All Meals; Ensure [employer] Anthropometrics:Height: 5' 10\" (177.8 cm),  Weight: 83.3 kg (183 lb 11.2 oz), Weight Source: Standing scale (comment), Body mass index is 26.36 kg/m². BMI class of overweight. UBW per wife 220# prior to CVA in May 2018. Pt presents with 17% weight loss over 16 months. Weight loss was reported to be continual for ~ one year and more recently stable between 180-185#. Pt presents with 17% weight loss over 16 months. Macronutrient needs: 83 kg standing weight 9/26/19 EER:  2168-5648 kcal /day (20-25 kcal/kg) EPR:   grams protein/day (1-1.2 grams/kg) Intake/Comparative Standards: Recorded meal(s): none. Wife reports he ate dessert and portion of protein food last night. Current tray with bites of foods consumed from all items and 100% of ensure enlive consumed. Insuffcient data to currently access. Recall per wife of ranges from % of needs. Nutrition Diagnosis: Predicted inadequate oral intake related to altered mentation, hx of compromised intake as evidenced by historical wt loss of 17% with recent stabilization, limited awareness of hunger cues reported at baseline. Intervention: 
Meals and snacks: Continue current diet. Nutrition supplement therapy: Continue Ensure Enlive TID - clarify strawberry flavor. Coordination of nutrition care: Discussed with Cassi Mcmanus, Admission RN, PT/OT, IDT rounds. Discharge Nutrition Plan: Continue Oral Nutrition Supplement (ONS) at discharge. Recommend Ensure Enlive or a comparable/similar product Twice daily for 30 days unless otherwise directed by your Primary Care Physician. [place], [address] [place] [address], [place] [address]

## 2019-09-27 NOTE — DISCHARGE INSTRUCTIONS

## 2019-09-27 NOTE — CONSULTS
[facility] [address] [place], [address] [facility] Chief Complaint Patient presents with  Altered mental status Betty Meeks is a 66 y.o. 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 He did however have seizures following the event and was placed on Keppra no further ictal events that occurred until yesterday The patient and his wife have recently relocated from [facility] he was last seen by neurologist in [facility] for 5 months ago at which time evaluation it included an EEG which indicated that he was on the verge of having seizures. The patient's wife was also told by the neurologist at that time that he was at maximum benefit likely from his hemorrhagic stroke and they did not anticipate that further improvement was likely to occur. Yesterday the patient's wife describes him developing a somewhat bemused look and he developed a jerking and she is a little unclear whether this was generalized or motor or localized. Subsequent to that he had rather profound weakness and when the patient's son came over he was more or less dead weight. Past Medical History:  
Diagnosis Date  Arthritis  CAD (coronary artery disease)  Hypertension  Seizures ([address]  Stroke ([facility] [address] Past Surgical History:  
Procedure Laterality Date  CARDIAC SURG PROCEDURE UNLIST    
 one stent  HX ORTHOPAEDIC    
 knee surgeries History reviewed. No pertinent family history. Social History Socioeconomic History  Marital status:  Spouse name: Not on file  Number of children: Not on file  Years of education: Not on file  Highest education level: Not on file Tobacco Use  Smoking status: Never Smoker  Smokeless tobacco: Never Used Substance and Sexual Activity  Alcohol use: Not Currently  Drug use: Never Current Facility-Administered Medications:  
  lisinopril (PRINIVIL, ZESTRIL) tablet 20 mg  (Patient Supplied), 20 mg, Oral, BID, Keli Sheikh MD, 20 mg at 09/27/19 1100 
  atorvastatin (LIPITOR) tablet 40 mg  (Patient Supplied), 40 mg, Oral, QHS, Keli Sheikh MD 
  [START ON 9/28/2019] donepezil (ARICEPT) tablet 10 mg  (Patient Supplied), 10 mg, Oral, DAILY, Keli Sheikh MD 
  NIFEdipine ER (PROCARDIA XL) tablet 60 mg  (Patient Supplied), 60 mg, Oral, BID, Keli Sheikh MD 
  sodium chloride (NS) flush 5-40 mL, 5-40 mL, IntraVENous, Q8H, Keli Sheikh MD, 5 mL at 09/27/19 2777   sodium chloride (NS) flush 5-40 mL, 5-40 mL, IntraVENous, PRN, Keli Sheikh MD 
  aspirin chewable tablet 81 mg, 81 mg, Oral, DAILY, Keli Sheikh MD, 81 mg at 09/27/19 3016   acetaminophen (TYLENOL) tablet 650 mg, 650 mg, Oral, Q4H PRN, Keli Sheikh MD, 650 mg at 09/26/19 1950   labetalol (NORMODYNE;TRANDATE) injection 5 mg, 5 mg, IntraVENous, Q10MIN PRN, Keli Sheikh MD 
  bisacodyl (DULCOLAX) tablet 5 mg, 5 mg, Oral, DAILY PRN, Keli Sheikh MD 
  heparin (porcine) injection 5,000 Units, 5,000 Units, SubCUTAneous, Q8H, Keli Sheikh MD, 5,000 Units at 09/27/19 1989   levETIRAcetam (KEPPRA) tablet 1,000 mg  (Patient Supplied), 1,000 mg, Oral, BID, Keli Sheikh MD, 1,000 mg at 09/27/19 1114   tuberculin injection 5 Units, 5 Units, IntraDERMal, ONCE, Keli Sheikh MD, 5 Units at 09/26/19 1718   potassium chloride (K-DUR, KLOR-CON) SR tablet 20 mEq  (Patient Supplied), 20 mEq, Oral, DAILY, Neli HOLLAND MD, 20 mEq at 09/27/19 1303   magnesium oxide (MAG-OX) tablet 400 mg  (Patient Supplied), 400 mg, Oral, TID, Neli HOLLAND MD, 400 mg at 09/27/19 1301   LORazepam (ATIVAN) injection 1 mg, 1 mg, IntraVENous, PRN, Jean Pierreendmolly Handsome, Amish Jimenes MD, 1 mg at 09/26/19 2021   esomeprazole (NEXIUM) capsule 40 mg   (Patient Supplied), 40 mg, Oral, ACB, Antwon HOLLAND MD, 40 mg at 09/27/19 1259   PARoxetine CR (PAXIL-CR) tablet 25 mg  (Patient Supplied), 25 mg, Oral, DAILY, Linda Torres MD, 25 mg at 09/27/19 1301 No Known Allergies Review of Systems as the patient is largely unresponsive but this is not basically feasible. I did discuss the situation with his wife She denies that he has been having episodes of diaphoresis. He has not been short of breath. Sleep pattern has not been significantly changed. He has not had new onset localized swelling. There has up to now not been specific change in terms of motor ability in terms of his balance while walking. No change in visual acuity. He has been noted to have become somewhat anorexic and have difficulty with eating more than one meal daily. Patient's wife indicates he craves sweets. From a GI standpoint he has had some stool incontinence while in the hospital this is unusual at home he has basically a normal bowel habit. From a cognitive standpoint the patient's wife indicates that she thought Aricept was initially helping him but at the present time his memory is poor. No changes occurred since the time of his stroke The patient is very hard of hearing Visit Vitals /72 (BP 1 Location: Right arm, BP Patient Position: At rest) Pulse 68 Temp 99.6 °F (37.6 °C) Resp 16 Ht 5' 10\" (1.778 m) Wt 183 lb 11.2 oz (83.3 kg) SpO2 95% BMI 26.36 kg/m² Neurologic Exam\ Performed via telemedicine with help with the patient's wife He is very hard of hearing and as such communication via the robot was essentially impossible Was able to ascertain that he does not have any evidence of a focal drift he is able to do finger-to-nose relatively accurately.   He does respond to his wife well in terms of gaze and there is no evidence of a gaze paralysis. There is a right facial droop noted on facial grimace. Motor examination in the lower extremities reveals that he does not have a focal drift currently and strength in the lower extremities is viewed to be substantially better than it was yesterday Most recent MRI Results from [facility] Encounter encounter on 09/26/19 MRI BRAIN WO CONT Narrative MRI BRAIN WITHOUT CONTRAST. HISTORY: Acute CVA. Left hand weakness. COMPARISON:  None. Study performed within 24 hours of admission. TECHNIQUE:  Sagittal T1, axial T1, T2, FLAIR, gradient echo, diffusion with ADC 
map and coronal FLAIR. FINDINGS:  Diffusion images do not demonstrate any areas of restricted diffusion 
to suggest acute infarction. No midline shift, mass or mass effect. Old right 
frontal lobe infarct with encephalomalacia. Gradient echo images demonstrate 
susceptibility artifact in the right frontal lobe, probably remote hemorrhage 
from the remote infarct in the area of encephalomalacia. Extensive nonspecific 
periventricular and centrum semiovale FLAIR and T2 white matter hyperintensities 
are present. No evidence of acute hemorrhage. The lateral ventricles are are 
prominent but there is symmetric volume loss as well. .  The pituitary, 
parasellar and midline structures are unremarkable on the sagittal T1 images. There are normal T2 flow-voids in the major vessels. Posterior fossa 
structures are unremarkable. The basal ganglia appear symmetric. Orbits are 
grossly normal.  Paranasal sinuses are clear. Impression IMPRESSION: Remote right frontal lobe infarct with encephalomalacia and 
hemosiderin staining. Extensive white matter changes, likely chronic small 
vessel disease. No acute infarction. Most recent MRA Results from [facility] Encounter encounter on 09/26/19 MRI BRAIN WO CONT Narrative MRI BRAIN WITHOUT CONTRAST. HISTORY: Acute CVA. Left hand weakness. COMPARISON:  None. Study performed within 24 hours of admission. TECHNIQUE:  Sagittal T1, axial T1, T2, FLAIR, gradient echo, diffusion with ADC 
map and coronal FLAIR. FINDINGS:  Diffusion images do not demonstrate any areas of restricted diffusion 
to suggest acute infarction. No midline shift, mass or mass effect. Old right 
frontal lobe infarct with encephalomalacia. Gradient echo images demonstrate 
susceptibility artifact in the right frontal lobe, probably remote hemorrhage 
from the remote infarct in the area of encephalomalacia. Extensive nonspecific 
periventricular and centrum semiovale FLAIR and T2 white matter hyperintensities 
are present. No evidence of acute hemorrhage. The lateral ventricles are are 
prominent but there is symmetric volume loss as well. .  The pituitary, 
parasellar and midline structures are unremarkable on the sagittal T1 images. There are normal T2 flow-voids in the major vessels. Posterior fossa 
structures are unremarkable. The basal ganglia appear symmetric. Orbits are 
grossly normal.  Paranasal sinuses are clear. Impression IMPRESSION: Remote right frontal lobe infarct with encephalomalacia and 
hemosiderin staining. Extensive white matter changes, likely chronic small 
vessel disease. No acute infarction. Most recent CTA Results from [facility] Encounter encounter on 09/26/19 CTA HEAD NECK W WO CONT Narrative Title:  CT arteriogram of the neck and head. Indication: History of right frontal intracranial hemorrhage. Worsening left leg 
weakness and confusion. Rizwana Monge Technique: Axial images of the neck and head were obtained after the uneventful  
administration of intravenous iodinated contrast media. Contrast was used to 
best identify the arterial structures.   Images were reviewed on a separate, free 
standing, three-dimensional workstation as per the referring physicians request. 
  
 
 All stenosis percentages derived by comparing the narrowest segment with the 
distal Internal Carotid Artery luminal diameter, as described in the [place] American Symptomatic Carotid Endarterectomy Trial (NASCET) criteria. All CT scans at this facility are performed using dose reduction/dose modulation 
techniques, as appropriate the performed exam, including the following: Automated Exposure Control; Adjustment of the mA and/or kV according to patient 
size (this includes techniques or standardized protocols for targeted exams 
where dose is matched to indication/reason for exam); and Use of Iterative Reconstruction Technique. Comparison: None. Findings:  
 
Lungs: Normal 
Soft Tissues: Normal 
Cervical Spine: Degenerative changes Aorta: Conventional 3 vessel arch with mild atherosclerotic changes. Great Vessels: Patent Changes of encephalomalacia in the right frontal region. Right ICA: Mild atherosclerotic changes in the cavernous sinus. There is also 
some somewhat irregular soft plaque in the proximal ICA causing no significant 
stenosis 
% Stenosis: Less than 25 Right MCA: Patent Right DONOVAN: Patent Left ICA: Mixed hard and soft plaque in the bulb. 
% Stenosis: Less than 25 Left MCA: Patent Left DONOVAN: Patent Right Vertebral: Patent Left Vertebral: Patent Dominance: Left Basilar: Patent Right PCA: Patent Left PCA: Patent Other Vascular: Negative Impression Impression: 1. No evidence of intracranial large vessel occlusion. 2. No significant ICA stenosis however there is some mildly irregular soft 
plaque in the proximal right ICA. Megha Salgado Most recent Echo Results for orders placed or performed during the hospital encounter of 09/26/19  
2D ECHO COMPLETE ADULT (TTE) W OR WO CONTR Narrative [facility] [address] [place], [address] [facility] 
[phone] Transthoracic Echocardiogram 
2D, M-mode, Doppler, and Color Doppler Patient: Jenny Martinez 
MR #: [id number] : 1945 Age: 66 years Gender: Male Study date: 27-Sep-2019 Account #: [de-identified] Height: 70 in Weight: 182.6 lb 
BSA: 2.01 mï¾² Status:Routine Location:  359 BP: 190/ 83 Allergies: NO KNOWN ALLERGENS Sonographer:  MINDA Banda Group:  [facility] Referring Physician:  Fairy Olszewski, MD 
Reading Physician:  Jovanny Shetty MD 
 
INDICATIONS: CVA PROCEDURE: This was a routine study. A transthoracic echocardiogram was 
performed. The study included complete 2D imaging, M-mode, complete spectral 
Doppler, and color Doppler. Intravenous contrast (Definity) was administered. Intravenous contrast (agitated saline) was administered. Image quality was 
adequate. LEFT VENTRICLE: Size was normal. Systolic function was hyperdynamic. Ejection 
fraction was estimated in the range of 65 % to 70 %. There were no regional 
wall motion abnormalities. There was mild concentric hypertrophy. Left 
ventricular diastolic function parameters were normal. Avg E/e': 7.6. RIGHT VENTRICLE: The size was normal. Systolic function was normal. The 
tricuspid jet envelope definition was inadequate for estimation of RV  
systolic 
pressure. LEFT ATRIUM: The atrium was mildly dilated. ATRIAL SEPTUM: Agitated saline contrast injection (bubble study) was  
performed. There was no right-to-left shunt, at rest or induced by the Valsalva  
maneuver. RIGHT ATRIUM: Size was normal. 
 
SYSTEMIC VEINS: IVC: The inferior vena cava was normal in size and course. AORTIC VALVE: The valve was trileaflet. Leaflets exhibited mild  
calcification. The aortic valve area by the continuity equation was 1.65 cm2. The peak 
velocity was 3.1 m/s. The mean pressure gradient was 18 mmHg. The peak  
pressure 
gradient was 39 mmHg. Di: 0.48, SVi: 51.2. There was mild stenosis. There was 
mild regurgitation. MITRAL VALVE: Valve structure was normal. There was no evidence for stenosis. There was trivial regurgitation. TRICUSPID VALVE: The valve structure was normal. There was no evidence for 
stenosis. There was mild regurgitation. PULMONIC VALVE: Not well visualized. There was no evidence for stenosis. There 
was no insufficiency. PERICARDIUM: There was no pericardial effusion. AORTA: The root exhibited normal size. SUMMARY: 
 
-  Left ventricle: Systolic function was hyperdynamic. Ejection fraction was 
estimated in the range of 65 % to 70 %. There were no regional wall motion 
abnormalities. There was mild concentric hypertrophy. -  Left atrium: The atrium was mildly dilated. -  Atrial septum: Agitated saline contrast injection (bubble study) was 
performed. There was no right-to-left shunt, at rest or induced by the  
Valsalva 
maneuver. -  Aortic valve: There was mild stenosis. There was mild regurgitation. SYSTEM MEASUREMENT TABLES 
 
2D mode AoR Diam (2D): 3.3 cm 
LA Dimension (2D): 4 cm Left Atrium Systolic Volume Index; Method of Disks, Biplane; 2D mode;: 43.8 
ml/m2 IVS/LVPW (2D): 0.9 IVSd (2D): 1.2 cm LVIDd (2D): 4.8 cm LVIDs (2D): 2.8 cm 
LVOT Area (2D): 3.5 cm2 LVPWd (2D): 1.4 cm RVIDd (2D): 3.3 cm Tissue Doppler Imaging LV Peak Early [place] Tissue Lawrence; Lateral MA (TDI): 11.7 cm/s LV Peak Early [place] Tissue Lawrence; Medial MA (TDI): 9.8 cm/s Unspecified Scan Mode Peak Grad; Mean; Antegrade Flow: 37 mm[Hg] Vmax; Antegrade Flow: 304 cm/s LVOT Diam: 2.1 cm 
MV Peak Lawrence/LV Peak Tissue Lawrence E-Wave; Lateral MA: 6.9 MV Peak Lawrence/LV Peak Tissue Lawrence E-Wave; Medial MA: 8.3 Prepared and signed by Jovanny Shetty MD 
Signed 27-Sep-2019 11:08:31 Most recent lipid panels Lab Results Component Value Date/Time  Cholesterol, total 152 09/27/2019 04:57 AM  
 HDL Cholesterol 44 09/27/2019 04:57 AM  
 LDL, calculated 91.2 09/27/2019 04:57 AM  
 VLDL, calculated 16.8 09/27/2019 04:57 AM  
 Triglyceride 84 09/27/2019 04:57 AM  
 CHOL/HDL Ratio 3.5 09/27/2019 04:57 AM  
 
 
Most recent Hgb A1C Lab Results Component Value Date/Time Hemoglobin A1c 5.5 09/27/2019 04:57 AM  
 
 
Laboratory review otherwise unremarkable with reference to evidence of the UTI or other major metabolic disturbance Diagnoses and all orders for this visit: 
 
1. Weakness Other orders -     INITIATE: SPECIFY; Standing 
-     INSERT PERIPHERAL IV; Standing 
-     CBC WITH AUTOMATED DIFF; Standing -     METABOLIC PANEL, COMPREHENSIVE; Standing -     MEASURE RECTAL TEMPERATURE; Standing 
-     STRAIGHT CATHETER (NURSING); Standing 
-     POC LACTIC ACID; Standing 
-     CULTURE, BLOOD; Standing 
-     CULTURE, BLOOD; Standing 
-     CT CODE NEURO HEAD WO CONTRAST; Standing 
-     POC PT/INR; Standing 
-     POC LACTIC ACID; Standing -     GLUCOSE, POC; Standing -     levETIRAcetam (KEPPRA) 1,000 mg in 0.9% sodium chloride 100 mL IVPB 
-     XR CHEST SNGL V; Standing 
-     POC URINE DIPSTICK; Standing -     INITIAL PHYSICIAN ORDER: OBSERVATION/OUTPATIENT IN A BED; Standing -     CARDIAC MONITORING; Standing 
-     NURSING-MISCELLANEOUS:; Standing 
-     VITAL SIGNS PER UNIT ROUTINE; Standing 
-     NOTIFY PROVIDER: VITAL SIGNS CHANGES; Standing 
-     NEURO/VASCULAR CHECKS; Standing 
-     INTAKE AND OUTPUT; Standing -     MEASURE HEIGHT; Standing -     WEIGH PATIENT; Standing 
-     FALL PRECAUTIONS; Standing 
-     NURSING-MISCELLANEOUS:; Standing 
-     NURSING-MISCELLANEOUS:; Standing 
-     sodium chloride (NS) flush 5-40 mL 
-     sodium chloride (NS) flush 5-40 mL 
-     LIPID PANEL; Standing 
-     HEMOGLOBIN A1C WITH EAG; Standing -     IP CONSULT TO STROKE COORDINATOR; Standing -     IP CONSULT TO CASE MANAGEMENT; Standing 
-     PT--EVAL, DEVISE PLAN OF CARE AND TREAT; Standing 
-     OT--EVAL, DEVISE PLAN OF CARE AND TREAT; Standing -     FULL CODE; Standing 
-     DIET CARDIAC; Standing 
-     aspirin chewable tablet 81 mg 
-     acetaminophen (TYLENOL) tablet 650 mg 
-     labetalol (NORMODYNE;TRANDATE) injection 5 mg 
-     bisacodyl (DULCOLAX) tablet 5 mg 
-     heparin (porcine) injection 5,000 Units 
-     CBC W/O DIFF; Standing 
-     TSH 3RD GENERATION; Standing -     MAGNESIUM; Standing 
-     2D ECHO COMPLETE ADULT (TTE) W OR WO CONTR; Standing -     IP CONSULT TO PHYSIATRIST(REHAB MEDICINE); Standing -     MRI BRAIN WO CONT; Standing 
-     URINALYSIS W/ RFLX MICROSCOPIC; Standing 
-     CULTURE, URINE; Standing -     levETIRAcetam (KEPPRA) tablet 1,000 mg  (Patient Supplied) -     PLEASE READ & DOCUMENT PPD TEST IN 24 HRS; Standing -     PLEASE READ & DOCUMENT PPD TEST IN 48 HRS; Standing -     PLEASE READ & DOCUMENT PPD TEST IN 72 HRS; Standing 
-     tuberculin injection 5 Units -     potassium chloride (K-DUR, KLOR-CON) SR tablet 40 mEq -     CTA HEAD NECK W WO CONT; Standing 
-     sodium chloride 0.9 % bolus infusion 100 mL 
-     saline peripheral flush soln 10 mL 
-     iopamidol (ISOVUE-370) 76 % injection 80 mL 
-     NURSING-MISCELLANEOUS:; Standing -     potassium chloride (K-DUR, KLOR-CON) SR tablet 20 mEq  (Patient Supplied) 
-     magnesium oxide (MAG-OX) tablet 400 mg  (Patient Supplied) -     LORazepam (ATIVAN) injection 1 mg 
-     XR HIP LT W OR WO PELV 2-3 VWS; Standing -     LORazepam (ATIVAN) 2 mg/mL injection 
-     esomeprazole (NEXIUM) capsule 40 mg   (Patient Supplied) -     PARoxetine CR (PAXIL-CR) tablet 25 mg  (Patient Supplied) -     perflutren lipid microspheres (DEFINITY) in NS bolus IV 
-     IP CONSULT TO NEUROLOGY; Standing 
-     lisinopril (PRINIVIL, ZESTRIL) tablet 20 mg  (Patient Supplied) -     METABOLIC PANEL, BASIC; Standing 
-     CBC W/O DIFF; Standing 
-     DIET NUTRITIONAL SUPPLEMENTS; Standing 
-     atorvastatin (LIPITOR) tablet 40 mg  (Patient Supplied) -     donepezil (ARICEPT) tablet 10 mg  (Patient Supplied) 
-     NIFEdipine ER (PROCARDIA XL) tablet 60 mg  (Patient Supplied) Impression status post hemorrhagic stroke with large amount of leuko-area ptosis. I would concur with the diagnosis from the [place] neurologist and at this time would wonder about the patient having had a seizure He has according to his wife perhaps missed a couple of doses of medication in the past week which may be a factor Patient's wife is indicated that there is been a great deal of increased irritability since his stroke and there may be several issues behind this 1 of which is Keppra. I might tend to suggest that he be switched over to zonisamide 100 mg twice daily. I would keep him on Keppra as bridge therapy until that occurs I would additionally suggest that his Aricept dosage be tapered down to 5 mg because of the anorexia.  
 
 
 
Sugar Renee MD

## 2019-09-27 NOTE — CONSULTS
Physical Medicine & Rehabilitation Note-consult Patient: Fei Max MRN: [id number]  SSN: [id number] YOB: 1945  Age: 66 y.o. Sex: male Admit Date: 9/26/2019 Admitting Physician: Leelee Chi MD 
 
Medical Decision Making/Plan/Recommend: Functional deficit, mobility, gait impairment. Continue acute PT, OT and ST as tolerated. Acute therapy to focus on gait training, transfer training, balance activities. LLE weakness reported to be improving, however still required min assist of 2 for transfers and gait. Anticipate continued functional improvement over the weekend. Anticipate safe community discharge eventually. If unable to return to home safely, short sub acute rehab course at SNF may be appropriate. 
   
Thank you for the opportunity to participate in the care of this patient. Chief Complaint : Gait dysfunction secondary to below. Admit Diagnosis: TIA (transient ischemic attack) [G45.9] TIA (transient ischemic attack) (9/26/2019) Seizures ([facility] [address] (9/26/2019) History of CVA (cerebrovascular accident) (9/26/2019) HTN (hypertension) (9/26/2019) HLD (hyperlipidemia) (9/26/2019) CAD (coronary artery disease) (9/26/2019) History of prostatectomy (9/26/2019) History of prostate cancer (9/26/2019) 
left LE weakness 
weakness Pain DVT risk Acute Rehab Dx: LLE weakness. Mobility and ambulation deficits Self Care/ADL deficits Medical Dx: 
Past Medical History:  
Diagnosis Date  Arthritis  CAD (coronary artery disease)  Hypertension  Seizures ([facility] [address]  Stroke ([facility] [address] Subjective:  
 
Date of Evaluation:  September 27, 2019 HPI: Fei Max is a 66 y.o. male patient at [address] [place] [address] who was admitted on 9/26/2019  by Leelee Chi MD with below mentioned medical history ,is being seen for Physical Medicine and Rehabilitation consult. Fei Max Patient was admitted with diagnosis of acute CVA and started on medical management for acute stroke. MRI showed remote right frontal lobe infarct with encephalomalacia and hemosiderin staining and extensive white matter changes, likely chronic small vessel disease. No acute infarction was reported. However patient continues to demonstrate left leg weakness that is worse than baseline. Patient is evaluated by neurology, who feel this may be secondary to seizures. EEG is pending. Patient continued on Keppra, monitored closely. Patient noted to have severely elevated, fluctuating BP, managed closely. Patient has started to participate in therapies with acute PT, OT and ST. Patient shows significant left leg weakness, limiting his mobility, ambulation and ADLs. Per PT/OT he is reported to bee leaning heavily to the left at times, reports his left leg feels much weaker than normal. He is reported to be incontinent of bowel and bladder. Patient is managing his transfers and gait with minimal assist of 2. iWlmer Kristofer is seen and examined today. Medical Records reviewed. At his baseline patient ambulated without assistive device and independently. Current Level of Function:   bed mobility - min A, transfers - min A x2, decreased balance , ambulation -short distance with RW and min A x2. Prior Level of Function/Work/Activity: Was walking without assistive device and independently prior to admit. History reviewed. No pertinent family history. Social History Tobacco Use  Smoking status: Never Smoker  Smokeless tobacco: Never Used Substance Use Topics  Alcohol use: Not Currently Past Surgical History:  
Procedure Laterality Date  CARDIAC SURG PROCEDURE UNLIST    
 one stent  HX ORTHOPAEDIC    
 knee surgeries Prior to Admission medications Medication Sig Start Date End Date Taking? Authorizing Provider  
esomeprazole (NEXIUM) 40 mg capsule Take 40 mg by mouth daily.    Yes Provider, Historical  
 L-Mfolate-B6 Phos-Methyl-B12 (METANX) 3-35-2 mg tab tab Take 1 Tab by mouth two (2) times a day. Yes Provider, Historical  
PARoxetine CR (PAXIL CR) 25 mg tablet Take 25 mg by mouth daily. Yes Provider, Historical  
levETIRAcetam (KEPPRA) 500 mg tablet Take 1,000 mg by mouth two (2) times a day. Yes Provider, Historical  
NIFEdipine ER (ADALAT CC) 60 mg ER tablet Take 60 mg by mouth two (2) times a day. Yes Provider, Historical  
lisinopril (PRINIVIL, ZESTRIL) 20 mg tablet Take 20 mg by mouth two (2) times a day. Yes Provider, Historical  
atorvastatin (LIPITOR) 20 mg tablet Take 20 mg by mouth daily. Yes Provider, Historical  
donepezil (ARICEPT) 10 mg tablet Take 10 mg by mouth daily. Yes Provider, Historical  
multivitamin (ONE A DAY) tablet Take 1 Tab by mouth daily. Yes Provider, Historical  
potassium chloride SR (KLOR-CON 10) 10 mEq tablet Take 20 mEq by mouth daily. Yes Provider, Historical  
magnesium oxide (MAG-OX) 400 mg tablet Take 400 mg by mouth three (3) times daily. Yes Provider, Historical  
 
No Known Allergies Review of Systems: Denies chest pain, shortness of breath, cough, headache, visual problems, abdominal pain, dysurea, calf pain. Pertinent positives are as noted in the medical records and unremarkable otherwise. Objective:  
 
Vitals: 
Blood pressure 186/67, pulse 66, temperature 100 °F (37.8 °C), resp. rate 16, height 5' 10\" (1.778 m), weight 183 lb 11.2 oz (83.3 kg), SpO2 99 %. Temp (24hrs), Av.8 °F (37.7 °C), Min:99 °F (37.2 °C), Max:100.3 °F (37.9 °C) BMI (calculated): 26.4 (19 1249) Intake and Output: 
No intake/output data recorded. Physical Exam:  
General: Alert and age appropriately oriented. No acute cardio respiratory distress. HEENT: Normocephalic,no scleral icterus Oral mucosa moist without cyanosis, No bruit, No JVD. Lungs: Clear to auscultation  bilaterally. Respiration even and unlabored Heart: Regular rate and rhythm, S1, S2 No  murmurs, clicks, rub or gallops Abdomen: Soft, non-tender, nondistended. Bowel sounds present. No organomegaly. Genitourinary: Benign . Neuromuscular: PERRL, EOMI Follows simple commands consistently. Able to identify, recall repeat. Mood appropriate. Insight, judgement intact. LUE     Shoulder abduction   /5 Elbow flexion:   /5 Wrist extension:   /5 Finger flexion;   /5 
 ADMQ:   /5 
RUE    Shoulder abduction:  /5   
            Elbow flexion:   /5 Wrist extension:  /5 Finger flexion:   /5 
 ADMQ:   /5 
LLE     Hip flexion:   /5 
            Knee extension:   /5 Ankle dorsiflexion:   /5 EHL:   /5 Ankle plantarflexion:   /5       
RLE     Hip flexion:   /5 
 Knee extension:   /5 Ankle dorsiflexion:   /5 EHL:   /5 Ankle plantarflexion:   /5 
Sensory - intact No cerebellar signs. No atrophy, no fasciculations, no tremors. Skin/extremity: No rashes, no erythema. Calf non tender BLE. Labs/Studies: 
Recent Results (from the past 72 hour(s)) CBC WITH AUTOMATED DIFF Collection Time: 09/26/19  1:07 PM  
Result Value Ref Range WBC 6.3 4.3 - 11.1 K/uL  
 RBC 4.71 4.23 - 5.6 M/uL  
 HGB 12.6 (L) 13.6 - 17.2 g/dL HCT 40.3 (L) 41.1 - 50.3 % MCV 85.6 79.6 - 97.8 FL  
 MCH 26.8 26.1 - 32.9 PG  
 MCHC 31.3 (L) 31.4 - 35.0 g/dL  
 RDW 14.9 (H) 11.9 - 14.6 % PLATELET 976 031 - 289 K/uL MPV 9.3 (L) 9.4 - 12.3 FL ABSOLUTE NRBC 0.00 0.0 - 0.2 K/uL  
 DF AUTOMATED NEUTROPHILS 88 (H) 43 - 78 % LYMPHOCYTES 7 (L) 13 - 44 % MONOCYTES 5 4.0 - 12.0 % EOSINOPHILS 0 (L) 0.5 - 7.8 % BASOPHILS 0 0.0 - 2.0 % IMMATURE GRANULOCYTES 0 0.0 - 5.0 %  
 ABS. NEUTROPHILS 5.5 1.7 - 8.2 K/UL  
 ABS. LYMPHOCYTES 0.5 0.5 - 4.6 K/UL  
 ABS. MONOCYTES 0.3 0.1 - 1.3 K/UL  
 ABS. EOSINOPHILS 0.0 0.0 - 0.8 K/UL ABS. BASOPHILS 0.0 0.0 - 0.2 K/UL  
 ABS. IMM. GRANS. 0.0 0.0 - 0.5 K/UL METABOLIC PANEL, COMPREHENSIVE Collection Time: 09/26/19  1:07 PM  
Result Value Ref Range Sodium 142 136 - 145 mmol/L Potassium 3.3 (L) 3.5 - 5.1 mmol/L Chloride 103 98 - 107 mmol/L  
 CO2 33 (H) 21 - 32 mmol/L Anion gap 6 (L) 7 - 16 mmol/L Glucose 90 65 - 100 mg/dL BUN 17 8 - 23 MG/DL Creatinine 1.11 0.8 - 1.5 MG/DL  
 GFR est AA >60 >60 ml/min/1.73m2 GFR est non-AA >60 >60 ml/min/1.73m2 Calcium 9.2 8.3 - 10.4 MG/DL Bilirubin, total 0.5 0.2 - 1.1 MG/DL  
 ALT (SGPT) 16 12 - 65 U/L  
 AST (SGOT) 19 15 - 37 U/L Alk. phosphatase 126 50 - 136 U/L Protein, total 7.7 6.3 - 8.2 g/dL Albumin 3.6 3.2 - 4.6 g/dL Globulin 4.1 (H) 2.3 - 3.5 g/dL A-G Ratio 0.9 (L) 1.2 - 3.5 CULTURE, BLOOD Collection Time: 09/26/19  1:07 PM  
Result Value Ref Range Special Requests: RIGHT Antecubital 
    
 Culture result: NO GROWTH AFTER 17 HOURS    
GLUCOSE, POC Collection Time: 09/26/19  1:08 PM  
Result Value Ref Range Glucose (POC) 88 65 - 100 mg/dL POC PT/INR Collection Time: 09/26/19  1:10 PM  
Result Value Ref Range Prothrombin time (POC) 13.9 (H) 9.6 - 11.6 SECS  
 INR (POC) 1.2 0.9 - 1.2 POC LACTIC ACID Collection Time: 09/26/19  1:12 PM  
Result Value Ref Range Lactic Acid (POC) 0.72 0.5 - 1.9 mmol/L  
CULTURE, BLOOD Collection Time: 09/26/19  3:31 PM  
Result Value Ref Range Special Requests: LEFT Antecubital 
    
 Culture result: NO GROWTH AFTER 15 HOURS    
LIPID PANEL Collection Time: 09/27/19  4:57 AM  
Result Value Ref Range LIPID PROFILE Cholesterol, total 152 MG/DL Triglyceride 84 35 - 150 MG/DL  
 HDL Cholesterol 44 40 - 60 MG/DL  
 LDL, calculated 91.2 <100 MG/DL VLDL, calculated 16.8 6.0 - 23.0 MG/DL  
 CHOL/HDL Ratio 3.5 <200 HEMOGLOBIN A1C WITH EAG Collection Time: 09/27/19  4:57 AM  
Result Value Ref Range Hemoglobin A1c 5.5 % Est. average glucose 111 mg/dL CBC W/O DIFF Collection Time: 09/27/19  4:57 AM  
Result Value Ref Range WBC 4.4 4.3 - 11.1 K/uL  
 RBC 4.34 4.23 - 5.6 M/uL  
 HGB 11.3 (L) 13.6 - 17.2 g/dL HCT 37.1 (L) 41.1 - 50.3 % MCV 85.5 79.6 - 97.8 FL  
 MCH 26.0 (L) 26.1 - 32.9 PG  
 MCHC 30.5 (L) 31.4 - 35.0 g/dL  
 RDW 14.7 (H) 11.9 - 14.6 % PLATELET 504 704 - 314 K/uL MPV 9.3 (L) 9.4 - 12.3 FL ABSOLUTE NRBC 0.00 0.0 - 0.2 K/uL  
TSH 3RD GENERATION Collection Time: 09/27/19  4:57 AM  
Result Value Ref Range TSH 0.413 uIU/mL MAGNESIUM Collection Time: 09/27/19  4:57 AM  
Result Value Ref Range Magnesium 2.1 1.8 - 2.4 mg/dL PLEASE READ & DOCUMENT PPD TEST IN 24 HRS Collection Time: 09/27/19  5:17 PM  
Result Value Ref Range PPD Negative Negative  
 mm Induration 0 0 - 5 mm Functional Assessment: 
Reviewed participation and progress in therapies Bed Mobility Supine to Sit: Minimum assistance (09/27/19 1400) Scooting: Minimum assistance (09/27/19 1400) Balance Sitting: Impaired; With support(leaning to the left) (09/27/19 1400) Sitting - Static: Poor (constant support) (09/27/19 1400) Sitting - Dynamic: Poor (constant support) (09/27/19 1400) Standing: Pull to stand; With support; Impaired (09/27/19 1400) Standing - Static: Fair (09/27/19 1400) Standing - Dynamic : Poor (09/27/19 1400) Toileting Toileting Assistance: Total assistance(dependent) (09/27/19 1020) Bladder Hygiene: Total assistance (dependent) (09/27/19 1020) Bowel Hygiene: Total assistance (dependent) (09/27/19 1020) Clothing Management: Total assistance (dependent) (09/27/19 1020) Adaptive Equipment: Walker (09/27/19 1020) Bed/Mat Mobility Supine to Sit: Minimum assistance (09/27/19 1400) Sit to Stand: Minimum assistance;Assist x2 (09/27/19 1400) Stand to Sit: Minimum assistance;Assist x2 (09/27/19 1400) Bed to Chair: Minimum assistance;Assist x2 (09/27/19 1400) Scooting: Minimum assistance (09/27/19 1400) Ambulation: 
Gait Step Length: Right shortened;Left shortened (09/27/19 1400) Gait Abnormalities: Decreased step clearance;Trunk sway increased; Path deviations (09/27/19 1400) Ambulation - Level of Assistance: Minimal assistance;Assist x2 (09/27/19 1400) Distance (ft): (bed to chair) (09/27/19 1400) Assistive Device: Walker, rolling (09/27/19 1400) Interventions: Safety awareness training;Verbal cues (09/27/19 1400) Impression/Plan:  
 
Principal Problem: 
  TIA (transient ischemic attack) (9/26/2019) Active Problems: 
  Seizures ([address] (9/26/2019) History of CVA (cerebrovascular accident) (9/26/2019) HTN (hypertension) (9/26/2019) HLD (hyperlipidemia) (9/26/2019) CAD (coronary artery disease) (9/26/2019) History of prostatectomy (9/26/2019) History of prostate cancer (9/26/2019) Current Facility-Administered Medications Medication Dose Route Frequency Provider Last Rate Last Dose  lisinopril (PRINIVIL, ZESTRIL) tablet 20 mg  (Patient Supplied)  20 mg Oral BID Marbin Fierro MD   20 mg at 09/27/19 1712  atorvastatin (LIPITOR) tablet 40 mg  (Patient Supplied)  40 mg Oral QHS Marbin Fierro MD      
 [START ON 9/28/2019] donepezil (ARICEPT) tablet 10 mg  (Patient Supplied)  10 mg Oral DAILY Marbin Feirro MD      
 NIFEdipine ER (PROCARDIA XL) tablet 60 mg  (Patient Supplied)  60 mg Oral BID Marbin Fierro MD      
 sodium chloride (NS) flush 5-40 mL  5-40 mL IntraVENous Q8H Marbin Fierro MD   5 mL at 09/27/19 7472  sodium chloride (NS) flush 5-40 mL  5-40 mL IntraVENous PRN Marbin Fierro MD      
 aspirin chewable tablet 81 mg  81 mg Oral DAILY Marbin Fierro MD   81 mg at 09/27/19 7954  acetaminophen (TYLENOL) tablet 650 mg  650 mg Oral Q4H PRN Marbin Fierro MD   650 mg at 09/26/19 1950  labetalol (NORMODYNE;TRANDATE) injection 5 mg  5 mg IntraVENous Q10MIN PRN Emilia Rey MD      
 bisacodyl (DULCOLAX) tablet 5 mg  5 mg Oral DAILY PRN Emilia Rey MD      
 heparin (porcine) injection 5,000 Units  5,000 Units SubCUTAneous Q8H Emilia Rey MD   5,000 Units at 09/27/19 3768  levETIRAcetam (KEPPRA) tablet 1,000 mg  (Patient Supplied)  1,000 mg Oral BID Emilia Rey MD   1,000 mg at 09/27/19 1714  potassium chloride (K-DUR, KLOR-CON) SR tablet 20 mEq  (Patient Supplied)  20 mEq Oral DAILY Tash Yanez MD   20 mEq at 09/27/19 1303  magnesium oxide (MAG-OX) tablet 400 mg  (Patient Supplied)  400 mg Oral TID Tash Yanez MD   400 mg at 09/27/19 1715  LORazepam (ATIVAN) injection 1 mg  1 mg IntraVENous PRN Tash Yanez MD   1 mg at 09/26/19 2021  esomeprazole (NEXIUM) capsule 40 mg   (Patient Supplied)  40 mg Oral ACB Tash Yanez MD   40 mg at 09/27/19 1259  PARoxetine CR (PAXIL-CR) tablet 25 mg  (Patient Supplied)  25 mg Oral DAILY Emilia Rey MD   25 mg at 09/27/19 1301 Signed By: Joseph Irizarry MD   
 September 27, 2019

## 2019-09-27 NOTE — PROGRESS NOTES
Problem: Self Care Deficits Care Plan (Adult) Goal: *Acute Goals and Plan of Care (Insert Text) Description 1. Patient will perform grooming with supervision. 2. Patient will perform upper body dressing with supervision. 3. Patient will perform lower body dressing with min assist. 
4. Patient will perform bathing with min assist. 
5. Patient will perform toileting and brief management with min assist. 
6. Patient will perform ADL functional mobility and tranfers in room with CGA. Goals to be achieved in 7 days. Outcome: Progressing Towards Goal 
  
 
 
OCCUPATIONAL THERAPY: Initial Assessment, Daily Note and AM 9/27/2019 OBSERVATION: 
SEIZURE PRECAUTIONS [place] / [place] / Product Type: Medicare /  
  
NAME/AGE/GENDER: Geneva Tinoco is a 66 y.o. male PRIMARY DIAGNOSIS:  TIA (transient ischemic attack) [G45.9] TIA (transient ischemic attack) TIA (transient ischemic attack) ICD-10: Treatment Diagnosis:  
 · Generalized Muscle Weakness (M62.81) · Other lack of cordination (R27.8) · Difficulty in walking, Not elsewhere classified (R26.2) · Repeated Falls (R29.6) · History of falling (Z91.81) Precautions/Allergies: 
   Patient has no known allergies. ASSESSMENT:  
SEIZURE PRECAUTIONS Mr. Wojciech Card presents supine in bed, he is very hard of hearing. He transferred to EOB with min assist and needed assistance with static sitting balance. He was able to don socks with therapist supporting fig 4 technique of legs. He stood with min assist x 2. He was incontinent 3 times during session of urine and bowel. He was unaware of at all three times of the incontinence. He was total assist for toileting and hygiene. Patient mod to max with ADLS. He has a mild tremor in R UE when using it to hold cup. His son was present. Mr. Wojciech Card was independent/mod I with ADLS  prior to admit.  Doing stairs and walking outside and in the community. He would benefit from skilled OT services. Will follow. He is sitting up in recliner, tab alert in place, son present. This section established at most recent assessment PROBLEM LIST (Impairments causing functional limitations): 1. Decreased Strength 2. Decreased ADL/Functional Activities 3. Decreased Transfer Abilities 4. Decreased Ambulation Ability/Technique 5. Decreased Balance 6. Decreased Cognition INTERVENTIONS PLANNED: (Benefits and precautions of occupational therapy have been discussed with the patient.) 1. Activities of daily living training 2. Adaptive equipment training 3. Balance training 4. Clothing management 5. Cognitive training 6. Donning&doffing training 7. Neuromuscular re-eduation 8. Therapeutic activity 9. Therapeutic exercise TREATMENT PLAN: Frequency/Duration: Follow patient 3 times/ week to address above goals. Rehabilitation Potential For Stated Goals: Good REHAB RECOMMENDATIONS (at time of discharge pending progress):   
Placement: It is my opinion, based on this patient's performance to date, that Mr. Bentley Phillip may benefit from intensive therapy at a [address] [place] [address] after discharge due to the functional deficits listed above that are likely to improve with skilled rehabilitation Equipment:  
? None at this time OCCUPATIONAL PROFILE AND HISTORY:  
History of Present Injury/Illness (Reason for Referral): 
See H and P Past Medical History/Comorbidities:  
Mr. Bentley Phillip  has a past medical history of Arthritis, CAD (coronary artery disease), Hypertension, Seizures ([address] and Stroke ([address] Mr. Bentley Phillip  has a past surgical history that includes hx orthopaedic and pr cardiac surg procedure unlist. 
Social History/Living Environment:  
Home Environment: Private residence One/Two Story Residence: Two story Living Alone: No 
Support Systems: Child(spencer), Family member(s) Patient Expects to be Discharged to[de-identified] Private residence Current DME Used/Available at Home: Eleazar Ferrara, [employer], maryam, [employer] Prior Level of Function/Work/Activity: Mod I with all ADLs, walking and going out in the community per son Number of Personal Factors/Comorbidities that affect the Plan of Care: Expanded review of therapy/medical records (1-2):  MODERATE COMPLEXITY ASSESSMENT OF OCCUPATIONAL PERFORMANCE[de-identified]  
Activities of Daily Living:  
Basic ADLs (From Assessment) Complex ADLs (From Assessment) Feeding: Stand-by assistance(holding coffee cup and drinking it) Oral Facial Hygiene/Grooming: Minimum assistance Bathing: Moderate assistance, Maximum assistance Upper Body Dressing: Minimum assistance, Additional time Lower Body Dressing: Minimum assistance, Moderate assistance(OT supported LE when patient donning socks) Toileting: Total assistance(incontinent BM and urine 3 times during eval) Grooming/Bathing/Dressing Activities of Daily Living Cognitive Retraining Safety/Judgement: Decreased awareness of environment;Decreased awareness of need for assistance;Decreased awareness of need for safety;Good awareness of safety precautions Toileting Toileting Assistance: Total assistance(dependent) Bladder Hygiene: Total assistance (dependent) Bowel Hygiene: Total assistance (dependent) Clothing Management: Total assistance (dependent) Adaptive Equipment: Eleazar Ferrara Upper Body Dressing Assistance Hospital Gown: Minimum  assistance; Moderate assistance(2 times) Bed/Mat Mobility Supine to Sit: Minimum assistance Sit to Stand: Minimum assistance;Assist x2 Stand to Sit: Minimum assistance;Assist x2 Bed to Chair: Minimum assistance;Assist x2 Scooting: Minimum assistance Most Recent Physical Functioning:  
Gross Assessment: 
  
         
  
Posture: 
  
Balance: 
Sitting: Impaired; With support Bed Mobility: 
Supine to Sit: Minimum assistance Scooting: Minimum assistance Wheelchair Mobility: 
  
Transfers: Sit to Stand: Minimum assistance;Assist x2 Stand to Sit: Minimum assistance;Assist x2 Bed to Chair: Minimum assistance;Assist x2 Patient Vitals for the past 6 hrs: 
 BP BP Patient Position SpO2 Pulse  
19 0740 190/83 At rest 100 % 64  
19 1147 159/72 At rest 95 % 68 Mental Status Neurologic State: Alert, Confused Orientation Level: Oriented to place, Oriented to situation Cognition: Impaired decision making, Poor safety awareness Perception: Cues to maintain midline in sitting, Cues to maintain midline in standing Perseveration: No perseveration noted Safety/Judgement: Decreased awareness of environment, Decreased awareness of need for assistance, Decreased awareness of need for safety, Good awareness of safety precautions Physical Skills Involved: 
1. Balance 2. Strength 3. Activity Tolerance Cognitive Skills Affected (resulting in the inability to perform in a timely and safe manner): 1. Perception 2. Comprehension Psychosocial Skills Affected: 1. Habits/Routines 2. Environmental Adaptation 3. Self-Awareness Number of elements that affect the Plan of Care: 5+:  HIGH COMPLEXITY CLINICAL DECISION MAKIN[address] [place] [id number] AM-PAC 6 Clicks Daily Activity Inpatient Short Form How much help from another person does the patient currently need. .. Total A Lot A Little None 1. Putting on and taking off regular lower body clothing? ? 1   ? 2   ? 3   ? 4  
2. Bathing (including washing, rinsing, drying)? ? 1   ? 2   ? 3   ? 4  
3. Toileting, which includes using toilet, bedpan or urinal?   ? 1   ? 2   ? 3   ? 4  
4. Putting on and taking off regular upper body clothing? ? 1   ? 2   ? 3   ? 4  
5. Taking care of personal grooming such as brushing teeth? ? 1   ? 2   ? 3   ? 4  
6. Eating meals? ? 1   ? 2   ? 3   ? 4  
© 2007, Trustees of [address] [place] [address] [address] [address], under license to [employer]. All rights reserved Score:  Initial:14 Most Recent: X (Date: -- ) Interpretation of Tool:  Represents activities that are increasingly more difficult (i.e. Bed mobility, Transfers, Gait). Medical Necessity:    
· Patient is expected to demonstrate progress in balance, coordination and functional technique ·  to decrease assistance required with self care and functional mobiltiy and improve safety during self care and functional mobiltiy · . Reason for Services/Other Comments: 
· Patient continues to require skilled intervention due to decreased self care and functional mobiltiy as compared to baseline · . Use of outcome tool(s) and clinical judgement create a POC that gives a: MODERATE COMPLEXITY  
 
 
 
TREATMENT:  
(In addition to Assessment/Re-Assessment sessions the following treatments were rendered) Pre-treatment Symptoms/Complaints:   
Pain: Initial:  
Pain Intensity 1: 0  Post Session:  0/10 Self Care: (30): Procedure(s) (per grid) utilized to improve and/or restore self-care/home management as related to dressing, toileting and grooming. Required maximal visual, verbal, manual and tactile cueing to facilitate activities of daily living skills and compensatory activities. Assessment/Reassessment  10 min Braces/Orthotics/Lines/Etc:  
· O2 Device: Nasal cannula Treatment/Session Assessment:   
· Response to Treatment:  confused, decreased safety, son present · Interdisciplinary Collaboration:  
o Physical Therapist 
o Occupational Therapist 
o Registered Nurse · After treatment position/precautions:  
o Up in chair 
o Bed alarm/tab alert on 
o Bed/Chair-wheels locked 
o Bed in low position 
o Caregiver at bedside 
o Call light within reach 
o Family at bedside 
o Nurse at bedside · Compliance with Program/Exercises: Compliant most of the time. · Recommendations/Intent for next treatment session:   \"Next visit will focus on advancements to more challenging activities and reduction in assistance provided\". Total Treatment Duration:30 
OT Patient Time In/Time Out Time In: 1020 Time Out: 1100 Namita Davidson OT

## 2019-09-27 NOTE — PROGRESS NOTES
Am assessment completed. Pt is alert and oriented x 3 has periods of forgetfulness and confusion related to previous stroke. Pt did well with NIH exam this am. Has worked with therapy. Has continence issues with bowel and bladder. He has had several stools this am not runny or lose but has been frequent. On fall precautions. Continue to monitor.

## 2019-09-27 NOTE — PROGRESS NOTES
MD [facility] notified of patient grimacing, c/o of leg groin and leg pain. Also aware of history of seizures. Orders received for Ativan 1 MG PRN, XRAY of pelvis/hip. Will notify XRAY downstairs.

## 2019-09-27 NOTE — PROGRESS NOTES
Problem: Mobility Impaired (Adult and Pediatric) Goal: *Acute Goals and Plan of Care (Insert Text) Description STG: 
(1.)Mr. Shukri Martins will move from supine to sit and sit to supine  with CONTACT GUARD ASSIST within 4 treatment day(s). (2.)Mr. Shukri Martins will transfer from bed to chair and chair to bed with MINIMAL ASSIST using the least restrictive device within 4 treatment day(s). (3.)Mr. Shukri Martins will ambulate with MINIMAL ASSIST for 25-50 feet with the least restrictive device within 4 treatment day(s). LTG: 
(1.)Mr. Shukri Martins will move from supine to sit and sit to supine  in bed with STAND BY ASSIST within 7 treatment day(s). (2.)Mr. Shukri Martins will transfer from bed to chair and chair to bed with STAND BY ASSIST using the least restrictive device within 7 treatment day(s). (3.)Mr. Shukri Martins will ambulate with STAND BY ASSIST for 100 feet with the least restrictive device within 7 treatment day(s). ________________________________________________________________________________________________ Outcome: Progressing Towards Goal 
  
PHYSICAL THERAPY: Initial Assessment and AM 9/27/2019 OBSERVATION: PT Visit Days : 1 [place] / [place] / Product Type: Medicare /   
  
NAME/AGE/GENDER: Chang Burroughs is a 66 y.o. male PRIMARY DIAGNOSIS: TIA (transient ischemic attack) [G45.9] TIA (transient ischemic attack) TIA (transient ischemic attack) ICD-10: Treatment Diagnosis:  
 · Generalized Muscle Weakness (M62.81) · Difficulty in walking, Not elsewhere classified (R26.2) Precaution/Allergies: 
Patient has no known allergies. ASSESSMENT:  
 
Mr. Shukri Martins presents with decreased overall strength and limited functional mobility. He leans heavily to the left at times in sitting and standing and reports his left leg feels much weaker than normally. He transferred bed to chair after extensive cleaning.  Incontinent of bowel and bladder. Hard of hearing and poor safety awareness. Recommend rehab considering his current functional level or will require 24/7 care based on his ability at evaluation. This section established at most recent assessment PROBLEM LIST (Impairments causing functional limitations): 1. Decreased Strength 2. Decreased Transfer Abilities 3. Decreased Ambulation Ability/Technique 4. Decreased Balance 5. Increased Fatigue INTERVENTIONS PLANNED: (Benefits and precautions of physical therapy have been discussed with the patient.) 1. Bed Mobility 2. Gait Training 3. Therapeutic Exercise/Strengthening 4. Transfer Training TREATMENT PLAN: Frequency/Duration: daily for duration of hospital stay Rehabilitation Potential For Stated Goals: Good REHAB RECOMMENDATIONS (at time of discharge pending progress):   
Placement: It is my opinion, based on this patient's performance to date, that Mr. Sidney Cisneros may benefit from intensive therapy at a [address] after discharge due to the functional deficits listed above that are likely to improve with skilled rehabilitation and concerns that he/she may be unsafe to be unsupervised at home due to increased weakness and poor functional abilility . Equipment: ? To be determined HISTORY:  
History of Present Injury/Illness (Reason for Referral): 
Patient is a 74yo M with hx R frontal ICH, seizures, and HTN who presents with worsening left leg weakness and confusion for one day. Pt reports feeling not right yesterday afternoon, followed by leg weakness and difficulty walking. From prior stroke, has some very mild deficits in leg coordination but wife reports strength normal at baseline. Communicates fluently at baseline. Has seizure disorder since ICH and takes keppra. Also with hx HTN, HLD. On atorvastatin and antihypertensives. Stopped taking ASA due to  bleeding after prostatectomy and radiation for prostate CA. At worst this morning, left leg was nearly flacid, now improving in strength since presentation. Has also had improvement in mental status but still responds inappropriately to some questions Past Medical History/Comorbidities:  
Mr. Bentley Phillip  has a past medical history of Arthritis, CAD (coronary artery disease), Hypertension, Seizures ([facility] [address] and Stroke ([facility] [address] Mr. Bentley Phillip  has a past surgical history that includes hx orthopaedic and pr cardiac surg procedure unlist. 
Social History/Living Environment:  
Home Environment: Private residence One/Two Story Residence: Two story Living Alone: No 
Support Systems: Child(spencer), Family member(s) Patient Expects to be Discharged to[de-identified] Private residence Current DME Used/Available at Home: [employer], Cane, straight, Grab bars Prior Level of Function/Work/Activity: Was walking without assistive device and independently prior to admit. Number of Personal Factors/Comorbidities that affect the Plan of Care: 1-2: MODERATE COMPLEXITY EXAMINATION:  
Most Recent Physical Functioning:  
Gross Assessment: 
  
         
  
Posture: 
  
Balance: 
Sitting: Impaired; With support(leaning to the left) Sitting - Static: Poor (constant support) Sitting - Dynamic: Poor (constant support) Standing: Pull to stand; With support; Impaired Standing - Static: Fair Standing - Dynamic : Poor Bed Mobility: 
Supine to Sit: Minimum assistance Scooting: Minimum assistance Wheelchair Mobility: 
  
Transfers: 
Sit to Stand: Minimum assistance;Assist x2 Stand to Sit: Minimum assistance;Assist x2 Bed to Chair: Minimum assistance;Assist x2 Gait: 
  
Step Length: Right shortened;Left shortened Gait Abnormalities: Decreased step clearance;Trunk sway increased; Path deviations Distance (ft): (bed to chair) Assistive Device: Walker, rolling Ambulation - Level of Assistance: Minimal assistance;Assist x2 Interventions: Safety awareness training;Verbal cues Body Structures Involved: 1. Muscles Body Functions Affected: 1. Movement Related Activities and Participation Affected: 1. Mobility Number of elements that affect the Plan of Care: 3: MODERATE COMPLEXITY CLINICAL PRESENTATION:  
Presentation: Evolving clinical presentation with changing clinical characteristics: MODERATE COMPLEXITY CLINICAL DECISION [address] [place] [address] AM-PAC 6 Clicks Basic Mobility Inpatient Short Form How much difficulty does the patient currently have. .. Unable A Lot A Little None 1. Turning over in bed (including adjusting bedclothes, sheets and blankets)? ? 1   ? 2   ? 3   ? 4  
2. Sitting down on and standing up from a chair with arms ( e.g., wheelchair, bedside commode, etc.)   ? 1   ? 2   ? 3   ? 4  
3. Moving from lying on back to sitting on the side of the bed?   ? 1   ? 2   ? 3   ? 4 How much help from another person does the patient currently need. .. Total A Lot A Little None 4. Moving to and from a bed to a chair (including a wheelchair)? ? 1   ? 2   ? 3   ? 4  
5. Need to walk in hospital room? ? 1   ? 2   ? 3   ? 4  
6. Climbing 3-5 steps with a railing? ? 1   ? 2   ? 3   ? 4  
© , Trustees of [address] [place] [address], under license to [employer]. All rights reserved Score:  Initial: 14 Most Recent: X (Date: -- ) Interpretation of Tool:  Represents activities that are increasingly more difficult (i.e. Bed mobility, Transfers, Gait). Medical Necessity:    
· Patient is expected to demonstrate progress in strength, balance and functional technique ·  to decrease assistance required with bed mobility, transfers and gait · . Reason for Services/Other Comments: 
· Patient continues to require skilled intervention due to limited functional independence · . Use of outcome tool(s) and clinical judgement create a POC that gives a: Clear prediction of patient's progress: LOW COMPLEXITY  
  
 
 
 
TREATMENT:  
 (In addition to Assessment/Re-Assessment sessions the following treatments were rendered) Pre-treatment Symptoms/Complaints:  none Pain: Initial:  
Pain Intensity 1: 0  Post Session:  0 Therapeutic Activity: (    10minutes): Therapeutic activities including Bed transfers, Chair transfers and Ambulation on level ground to improve mobility and balance. Required moderate Safety awareness training;Verbal cues to promote static and dynamic balance in standing. Braces/Orthotics/Lines/Etc:  
· O2 Device: Nasal cannula Treatment/Session Assessment:   
· Response to Treatment:  tolerated well but was having very frequent bowel movements · Interdisciplinary Collaboration:  
o Physical Therapist 
o Occupational Therapist 
o Registered Nurse 
o  · After treatment position/precautions:  
o Up in chair 
o Bed alarm/tab alert on 
o Bed/Chair-wheels locked 
o Call light within reach 
o RN notified 
o Family at bedside 
o Nurse at bedside · Compliance with Program/Exercises: Compliant most of the time, Will assess as treatment progresses · Recommendations/Intent for next treatment session: \"Next visit will focus on advancements to more challenging activities and reduction in assistance provided\". Total Treatment Duration: PT Patient Time In/Time Out Time In: 1000 Time Out: 1020 Erich Dickson, PT

## 2019-09-28 PROBLEM — G40.901 STATUS EPILEPTICUS (HCC): Status: ACTIVE | Noted: 2019-01-01

## 2019-09-28 NOTE — PROGRESS NOTES
Review of patient's electroencephalogram reveals active ictal tendency in the right hemisphere in the light of the current history will add phenytoin see orders

## 2019-09-28 NOTE — PROGRESS NOTES
Problem: TIA/CVA Stroke: Day 2 Until Discharge Goal: Off Pathway (Use only if patient is Off Pathway) Outcome: Progressing Towards Goal 
Goal: Activity/Safety Outcome: Progressing Towards Goal 
Goal: Diagnostic Test/Procedures Outcome: Progressing Towards Goal 
Goal: Nutrition/Diet Outcome: Progressing Towards Goal 
Goal: Medications Outcome: Progressing Towards Goal 
  
Problem: Falls - Risk of 
Goal: *Absence of Falls Description Document Mode Crook Fall Risk and appropriate interventions in the flowsheet. Outcome: Progressing Towards Goal 
Note:  
Fall Risk Interventions: 
Mobility Interventions: Bed/chair exit alarm Mentation Interventions: Bed/chair exit alarm Medication Interventions: Bed/chair exit alarm Elimination Interventions: Bed/chair exit alarm History of Falls Interventions: Bed/chair exit alarm, Door open when patient unattended Problem: Nutrition Deficit Goal: *Optimize nutritional status Outcome: Progressing Towards Goal 
  
Problem: Patient Education: Go to Patient Education Activity Goal: Patient/Family Education Outcome: Progressing Towards Goal 
  
Problem: Patient Education: Go to Patient Education Activity Goal: Patient/Family Education Outcome: Progressing Towards Goal

## 2019-09-28 NOTE — PROGRESS NOTES
Shift assessment complete. Pt alert, oriented to person and place, disoriented to time and situation. NIH assessment also complete with change of shift RN, Dwyane Goltz. Total score 1. See flow sheets for full assessment. Respirations even and unlabored. Lung sounds clear on supplemental 02 via NC. HR regular, tele on per MD order. Abdomen soft with active bowel sounds heard in all four quadrants. Skin intact, no edema noted. IV patent and capped. Pt denies pain. Safety measures in place, call light within reach, fall prevention devices intact, family at bedside. Will continue to monitor.

## 2019-09-28 NOTE — PROGRESS NOTES
Pt complains of a headache. PRN Tylenol 650 mg given PO per MD order. Safety measures in place, call light within reach, wife at bedside. Will continue to monitor.

## 2019-09-28 NOTE — PROGRESS NOTES
Shift assessment done. Pt seen in bed with wife at bedside watching TV, alert and noted to be more confused today. NIH done with outgoing nurse Nba Anaya RN. Respirations even and unlabored. Abdomen soft with active bowel sounds. Denies needs and pain this time. Instructed to call for assistance when needed. Bed alarm on. Call light in reach. Bed low and locked. Will continue to monitor.

## 2019-09-28 NOTE — PROGRESS NOTES
Hospitalist Progress Note 2019 Admit Date: 2019  1:05 PM  
NAME: Valentina Reyna :  1945 MRN:  [id number] Attending: Leah Sherman MD 
PCP:  Edgardo Wilkinson MD 
 
SUBJECTIVE:  
Patient is a 72yo M with hx HTN and R frontal ICH with subsequent seizure disorder who presented with worsening L leg weakness and confusion for one day. Has mild leg weakness and cognitive deficits after prior stroke. Takes keppra for seizure disorder. Not on asa at home due to bleeding from bladder after radiation/prostatectomy for prostate CA. 
 
:  
EEG showed active seizure activity, neurology adding phenytoin. Pt feeling much stronger today, OT still recommending STR. Repeated incontinence during OT session. Review of Systems negative with exception of pertinent positives noted above PHYSICAL EXAM  
 
Visit Vitals /70 (BP 1 Location: Left arm, BP Patient Position: At rest;Supine) Pulse (!) 55 Temp 97.9 °F (36.6 °C) Resp 18 Ht 5' 10\" (1.778 m) Wt 83.3 kg (183 lb 11.2 oz) SpO2 100% BMI 26.36 kg/m² Temp (24hrs), Av.7 °F (37.1 °C), Min:97.6 °F (36.4 °C), Max:100 °F (37.8 °C) Oxygen Therapy O2 Sat (%): 100 % (19 1142) O2 Device: Nasal cannula (19) O2 Flow Rate (L/min): 2 l/min (19) No intake or output data in the 24 hours ending 19 1200 General: No acute distress Lungs:  CTA Bilaterally. Heart:  Regular rate and rhythm,  No murmur, rub, or gallop Abdomen: Soft, Non distended, Non tender, Positive bowel sounds Extremities: No cyanosis, clubbing or edema Neurologic:  Alert, hard of hearing, answers questions appropriately, fully oriented. Slight weakness in left leg at baseline per patient. XR HIP LT W OR WO PELV 2-3 VWS Final Result IMPRESSION: Negative for acute fracture. MRI BRAIN WO CONT Final Result IMPRESSION: Remote right frontal lobe infarct with encephalomalacia and  
 hemosiderin staining. Extensive white matter changes, likely chronic small  
vessel disease. No acute infarction. CTA HEAD NECK W WO CONT Final Result Impression: 1. No evidence of intracranial large vessel occlusion. 2. No significant ICA stenosis however there is some mildly irregular soft  
plaque in the proximal right ICA. Marcie Nims XR CHEST SNGL V Final Result IMPRESSION: Unremarkable portable chest x-ray. No acute abnormality evident. CT CODE NEURO HEAD WO CONTRAST Final Result IMPRESSION:  
1. No acute intracranial abnormality. Note, acute/subacute CVA cannot be  
excluded given the severity of the underlying white matter disease. Consider  
further evaluation with brain MRI to better evaluate for acute CVA. 2. Severe chronic white matter microangiopathic disease and cerebral atrophy. 3. Encephalomalacia in the right frontal lobe most in keeping with prior CVA. ASSESSMENT Active Hospital Problems Diagnosis Date Noted  TIA (transient ischemic attack) 09/26/2019  Seizures ([address] 09/26/2019  
 History of CVA (cerebrovascular accident) 09/26/2019  
 HTN (hypertension) 09/26/2019  HLD (hyperlipidemia) 09/26/2019  CAD (coronary artery disease) 09/26/2019  
 History of prostatectomy 09/26/2019  
 History of prostate cancer 09/26/2019 Plan: Active seizure activity:  
Neuro following, adjusting meds No signs new ischemic event. continue home lipitor, restarted ASA (prior discontinued due to bladder bleeding related to radiation) Symptoms improving Aricept decreased per neuro reccs for anorexia 
  
Hypokalemia: replace, monitor HTN: restared home nifedipine and lisinopril 
  
DVT Prophylaxis: HSQ Dispo: possible STR or home depending on progress - may improve with better seizure control Signed By: Smiley Sloan MD   
 September 28, 2019

## 2019-09-28 NOTE — PROGRESS NOTES
Problem: Self Care Deficits Care Plan (Adult) Goal: *Acute Goals and Plan of Care (Insert Text) Description 1. Patient will perform grooming with supervision. 2. Patient will perform upper body dressing with supervision. 3. Patient will perform lower body dressing with min assist.PROGRESSING 4. Patient will perform bathing with min assist. 
5. Patient will perform toileting and brief management with min assist. 
6. Patient will perform ADL functional mobility and tranfers in room with CGA. Alfa Thompson PROGRESSING Goals to be achieved in 7 days. Outcome: Progressing Towards Goal 
  
 
 
OCCUPATIONAL THERAPY: Daily Note and AM 9/28/2019 OBSERVATION: 
SEIZURE PRECAUTIONS OT Visit Days: 2 [place] / Product Type: Medicare /  
  
NAME/AGE/GENDER: Kira Sanchez is a 66 y.o. male PRIMARY DIAGNOSIS:  TIA (transient ischemic attack) [G45.9] TIA (transient ischemic attack) TIA (transient ischemic attack) ICD-10: Treatment Diagnosis:  
 · Generalized Muscle Weakness (M62.81) · Other lack of cordination (R27.8) · Difficulty in walking, Not elsewhere classified (R26.2) · Repeated Falls (R29.6) · History of falling (Z91.81) Precautions/Allergies: 
   Patient has no known allergies. ASSESSMENT:  
SEIZURE PRECAUTIONS Mr. Wilmer Rutherford presents supine in bed, he is very hard of hearing. He transferred to EOB with min assist and needed assistance with static sitting balance. He was able to don socks with therapist supporting fig 4 technique of legs. He stood with min assist x 2. He was incontinent 3 times during session of urine and bowel. He was unaware of at all three times of the incontinence. He was total assist for toileting and hygiene. Patient mod to max with ADLS. He has a mild tremor in R UE when using it to hold cup. His son was present. Mr. Wilmer Rutherford was independent/mod I with ADLS  prior to admit.  Doing stairs and walking outside and in the [facility]. He would benefit from skilled OT services. Will follow. He is sitting up in recliner, tab alert in place, son present. 9/28/19 1050am. Patient in bed had just returned to bed having toileted with nursing. He transferred to EOB with supervision. He unknotted and folded pillow cases with initial demonstration only for task performance. He participated in writing and cognitive tasks. He is confused but follows directions better today. He was able to don socks seated on EOB with SBA and no loss of balance. He stood with min assist post. Lean, he took side steps to hob with min assist. He performed sit to stand 3 times with min to CGA for effective body mechanics. He returned to sit and supine. He is making progress with goals and would continue to benefit from skilled OT . Will continue with OT poc. This section established at most recent assessment PROBLEM LIST (Impairments causing functional limitations): 1. Decreased Strength 2. Decreased ADL/Functional Activities 3. Decreased Transfer Abilities 4. Decreased Ambulation Ability/Technique 5. Decreased Balance 6. Decreased Cognition INTERVENTIONS PLANNED: (Benefits and precautions of occupational therapy have been discussed with the patient.) 1. Activities of daily living training 2. Adaptive equipment training 3. Balance training 4. Clothing management 5. Cognitive training 6. Donning&doffing training 7. Neuromuscular re-eduation 8. Therapeutic activity 9. Therapeutic exercise TREATMENT PLAN: Frequency/Duration: Follow patient 3 times/ week to address above goals. Rehabilitation Potential For Stated Goals: Good REHAB RECOMMENDATIONS (at time of discharge pending progress):   
Placement: It is my opinion, based on this patient's performance to date, that Mr. Stephania Brennan may benefit from intensive therapy at [address] after discharge due to the functional deficits listed above that are likely to improve with skilled rehabilitation Equipment:  
? None at this time OCCUPATIONAL PROFILE AND HISTORY:  
History of Present Injury/Illness (Reason for Referral): 
See H and P Past Medical History/Comorbidities:  
Mr. Dottie Rueda  has a past medical history of Arthritis, CAD (coronary artery disease), Hypertension, Seizures ([facility] [address] and Stroke ([facility] [address] Mr. Dottie Rueda  has a past surgical history that includes hx orthopaedic and pr cardiac surg procedure unlist. 
Social History/Living Environment:  
Home Environment: Private residence One/Two Story Residence: Two story Living Alone: No 
Support Systems: Child(spencer), Family member(s) Patient Expects to be Discharged to[de-identified] Private residence Current DME Used/Available at Home: Chuck Em, Cane, straight, Grab bars Prior Level of Function/Work/Activity: Mod I with all ADLs, walking and going out in the community per son Number of Personal Factors/Comorbidities that affect the Plan of Care: Expanded review of therapy/medical records (1-2):  MODERATE COMPLEXITY ASSESSMENT OF OCCUPATIONAL PERFORMANCE[de-identified]  
Activities of Daily Living:  
Basic ADLs (From Assessment) Complex ADLs (From Assessment) Feeding: Stand-by assistance(holding coffee cup and drinking it) Oral Facial Hygiene/Grooming: Minimum assistance Bathing: Moderate assistance, Maximum assistance Upper Body Dressing: Minimum assistance, Additional time Lower Body Dressing: Minimum assistance, Moderate assistance(OT supported LE when patient donning socks) Toileting: Total assistance(incontinent BM and urine 3 times during eval) Grooming/Bathing/Dressing Activities of Daily Living Cognitive Retraining Safety/Judgement: Fall prevention Lower Body Dressing Assistance Socks: Stand-by assistance Bed/Mat Mobility Supine to Sit: Supervision Sit to Supine: Supervision Sit to Stand: Minimum assistance Stand to Sit: Contact guard assistance Most Recent Physical Functioning:  
Gross Assessment: 
  
         
  
Posture: 
  
Balance: 
Sitting: Intact Sitting - Static: Good (unsupported) Sitting - Dynamic: Fair (occasional)(Fair (+)) Standing: Pull to stand; With support Standing - Static: (CGA) Standing - Dynamic : (min assist hand held) Bed Mobility: 
Supine to Sit: Supervision Sit to Supine: Supervision Wheelchair Mobility: 
  
Transfers: 
Sit to Stand: Minimum assistance Stand to Sit: Contact guard assistance Patient Vitals for the past 6 hrs: 
 BP BP Patient Position SpO2 Pulse  
19 0756 138/59 At rest;Supine 97 % (!) 56 Mental Status Neurologic State: Alert, Confused Orientation Level: Oriented to person, Oriented to place Cognition: Follows commands Perception: Cues to maintain midline in standing Perseveration: No perseveration noted Safety/Judgement: Fall prevention Physical Skills Involved: 
1. Balance 2. Strength 3. Activity Tolerance Cognitive Skills Affected (resulting in the inability to perform in a timely and safe manner): 1. Perception 2. Comprehension Psychosocial Skills Affected: 1. Habits/Routines 2. Environmental Adaptation 3. Self-Awareness Number of elements that affect the Plan of Care: 5+:  HIGH COMPLEXITY CLINICAL DECISION MAKIN [facility] [address] [address] AM-PAC 6 Clicks Daily Activity Inpatient Short Form How much help from another person does the patient currently need. .. Total A Lot A Little None 1. Putting on and taking off regular lower body clothing? ? 1   ? 2   ? 3   ? 4  
2. Bathing (including washing, rinsing, drying)? ? 1   ? 2   ? 3   ? 4  
3. Toileting, which includes using toilet, bedpan or urinal?   ? 1   ? 2   ? 3   ? 4  
4. Putting on and taking off regular upper body clothing? ? 1   ? 2   ? 3   ? 4  
5. Taking care of personal grooming such as brushing teeth? ? 1   ? 2   ? 3   ? 4  
6. Eating meals?    ? 1   ? 2   ? 3   ? 4  
 © 2007, Trustees of [address] [place] [address] [address] [address], under license to [employer]. All rights reserved Score:  Initial:14 Most Recent: X (Date: -- ) Interpretation of Tool:  Represents activities that are increasingly more difficult (i.e. Bed mobility, Transfers, Gait). Medical Necessity:    
· Patient is expected to demonstrate progress in balance, coordination and functional technique ·  to decrease assistance required with self care and functional mobiltiy and improve safety during self care and functional mobiltiy · . Reason for Services/Other Comments: 
· Patient continues to require skilled intervention due to decreased self care and functional mobiltiy as compared to baseline · . Use of outcome tool(s) and clinical judgement create a POC that gives a: MODERATE COMPLEXITY  
 
 
 
TREATMENT:  
(In addition to Assessment/Re-Assessment sessions the following treatments were rendered) Pre-treatment Symptoms/Complaints:   
Pain: Initial:  
Pain Intensity 1: 0  Post Session:  0/10 Therapeutic Activity: (    20): Therapeutic activities including Bed transfers and cognitive tasks for direction following, B UE therpeutic activities to increase balance and coordination. Required minimal   \for activities today. Braces/Orthotics/Lines/Etc:  
· IV 
· O2 Device: Nasal cannula Treatment/Session Assessment:   
· Response to Treatment:  More aware today and better balance · Interdisciplinary Collaboration:  
o Occupational Therapist 
o Registered Nurse 
o Certified Nursing Assistant/Patient Care Technician · After treatment position/precautions:  
o Up in chair 
o Bed alarm/tab alert on 
o Bed/Chair-wheels locked 
o Bed in low position 
o Call light within reach 
o RN notified 
o Family at bedside · Compliance with Program/Exercises: Compliant most of the time. · Recommendations/Intent for next treatment session:   \"Next visit will focus on advancements to more challenging activities and reduction in assistance provided\". Total Treatment Duration:20 
OT Patient Time In/Time Out Time In: 1050 Time Out: 1110 Sandra Lugo OT

## 2019-09-28 NOTE — PROCEDURES
Electroencephalogram 
 
The study is performed on a multichannel digital EEG instrument with the patient awake utilizing the International 10-20 electrode system and with all impedances being under 5000 ohms There is a 9 Hz occipital alpha present in the left sarah beth-cerebrum activity in the right sarah beth-stable reaffirmed is dominated by delta activity which is mono rhythmic and at times contains multifocal sharp discharges. No generalization to the left sarah beth-cerebrum is identified. No activating procedures were attempted Impression Is an abnormal electroencephalogram which demonstrates the presence of destructive pathology in the right sarah beth-cerebrum This is consistent with active ictal discharges

## 2019-09-28 NOTE — PHYSICIAN ADVISORY
Letter of Status Determination:  
Recommend hospitalization status upgraded from OBSERVATION  to INPATIENT  Status Pt Name:  Pierce Washington MR#  
RENU # [id number] / 
[id number] [place] / Plan: [facility] MEDICARE PART A AND B / Product Type: Medicare /   
RHONDA#  [id number] Room and Hospital  359/01  @ [address] [place] [address] Hospitalization date  9/26/2019  1:05 PM  
Current Attending Physician  Carol Ann Mccrary MD  
Principal diagnosis  TIA (transient ischemic attack) [G45.9] Clinicals  66 y.o. 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 He did however have seizures following the event and was placed on Keppra no further ictal events that occurred until yesterday 
  
  
[facility] [place] Does  NOT apply New onset seizure with h/o CVA STATUS DETERMINATION  This patient is at above high risk of deterioration based on documented presenting clinical data, comorbid conditions, high risk of adverse events and current acute care course. Mr. Pierce Washington now meets Inpatient Admission status criteria in accordance with CMS regulation Section 43 .3. Specifically, due to medical necessity the patient's stay now exceeds Two Midnights. It is our recommendation that this patient's hospitalization status should be upgraded from  OBSERVATION to INPATIENT status. The final decision of the patient's hospitalization status depends on the attending physician's judgment Additional comments [place] / Product Type: Medicare /   
  
Gabbi Dorman MD [facility] Physician Advisor [facility] [employer] [phone] . 
 
9/28/2019 2:35 PM [phone] OBSERVATION [address] [place] Don[place] / Plan: [facility] MEDICARE PART A AND B / Product Type: Medicare /  Paulina James

## 2019-09-28 NOTE — PROGRESS NOTES
Problem: Mobility Impaired (Adult and Pediatric) Goal: *Acute Goals and Plan of Care (Insert Text) Description STG: 
(1.)Mr. Nellie Leos will move from supine to sit and sit to supine  with CONTACT GUARD ASSIST within 4 treatment day(s). (2.)Mr. Nellie Leos will transfer from bed to chair and chair to bed with MINIMAL ASSIST using the least restrictive device within 4 treatment day(s). Met 9/28 
(3.)Mr. Nellie Leos will ambulate with MINIMAL ASSIST for 25-50 feet with the least restrictive device within 4 treatment day(s). Met 9/28 LTG: 
(1.)Mr. Nellie Leos will move from supine to sit and sit to supine  in bed with STAND BY ASSIST within 7 treatment day(s). (2.)Mr. Nellie Leos will transfer from bed to chair and chair to bed with STAND BY ASSIST using the least restrictive device within 7 treatment day(s). (3.)Mr. Nellie Leos will ambulate with STAND BY ASSIST for 100 feet with the least restrictive device within 7 treatment day(s). ________________________________________________________________________________________________ Outcome: Progressing Towards Goal 
  
PHYSICAL THERAPY: Daily Note and PM 9/28/2019 OBSERVATION: PT Visit Days : 2 [place] / Product Type: Medicare /   
  
NAME/AGE/GENDER: Patricia Galvez is a 66 y.o. male PRIMARY DIAGNOSIS: TIA (transient ischemic attack) [G45.9] TIA (transient ischemic attack) TIA (transient ischemic attack) ICD-10: Treatment Diagnosis:  
 · Generalized Muscle Weakness (M62.81) · Difficulty in walking, Not elsewhere classified (R26.2) Precaution/Allergies: 
Patient has no known allergies. ASSESSMENT:  
 
Mr. Nellie Leos showed increased gait distance but very ataxic, core instability, multi-directional LOB. Pt had difficulty with eccentric muscle control in [address] & open chain. Pt is an extremely high fall risk. This pt is too hard & unsafe for spouse to manage pt at home.  PT strongly advises in-pt rehab setting to get more stable & manageable prior to DC home with caregiver. This section established at most recent assessment PROBLEM LIST (Impairments causing functional limitations): 1. Decreased Strength 2. Decreased Transfer Abilities 3. Decreased Ambulation Ability/Technique 4. Decreased Balance 5. Increased Fatigue INTERVENTIONS PLANNED: (Benefits and precautions of physical therapy have been discussed with the patient.) 1. Bed Mobility 2. Gait Training 3. Therapeutic Exercise/Strengthening 4. Transfer Training TREATMENT PLAN: Frequency/Duration: daily for duration of hospital stay Rehabilitation Potential For Stated Goals: Good REHAB RECOMMENDATIONS (at time of discharge pending progress):   
Placement: It is my opinion, based on this patient's performance to date, that Mr. Darrin Horan may benefit from intensive therapy at an [facility] after discharge due to a probable need for close medical supervision by a rehab physician, a probable need for 24 hour rehab nursing, a probable need for multiple therapy disciplines and potential to make ongoing and sustainable functional improvement that is of practical value. Jessica Frizzle Equipment: ? To be determined HISTORY:  
History of Present Injury/Illness (Reason for Referral): 
Patient is a 74yo M with hx R frontal ICH, seizures, and HTN who presents with worsening left leg weakness and confusion for one day. Pt reports feeling not right yesterday afternoon, followed by leg weakness and difficulty walking. From prior stroke, has some very mild deficits in leg coordination but wife reports strength normal at baseline. Communicates fluently at baseline. Has seizure disorder since ICH and takes keppra. Also with hx HTN, HLD. On atorvastatin and antihypertensives. Stopped taking ASA due to  bleeding after prostatectomy and radiation for prostate CA. At worst this morning, left leg was nearly flacid, now improving in strength since presentation. Has also had improvement in mental status but still responds inappropriately to some questions Past Medical History/Comorbidities:  
Mr. Roxy Arce  has a past medical history of Arthritis, CAD (coronary artery disease), Hypertension, Seizures ([address] and Stroke ([facility] [address] Mr. Roxy Arce  has a past surgical history that includes hx orthopaedic and pr cardiac surg procedure unlist. 
Social History/Living Environment:  
Home Environment: Private residence One/Two Story Residence: Two story Living Alone: No 
Support Systems: Child(spencer), Family member(s) Patient Expects to be Discharged to[de-identified] Private residence Current DME Used/Available at Home: Mark Leech, Cane, straight, Grab bars Prior Level of Function/Work/Activity: Was walking without assistive device and independently prior to admit. Number of Personal Factors/Comorbidities that affect the Plan of Care: 1-2: MODERATE COMPLEXITY EXAMINATION:  
Most Recent Physical Functioning:  
Gross Assessment: 3/-/5 throughout Balance: 
Sitting: Impaired; Without support Sitting - Static: (fair to fair-) Sitting - Dynamic: (fair-) Standing: Impaired; With support(walker) Bed Mobility: 
Supine to Sit: Minimum assistance Sit to Supine: (NT) Scooting: Contact guard assistance Transfers: 
Sit to Stand: Minimum assistance Stand to Sit: Minimum assistance Bed to Chair: Minimum assistance(with walker) Gait: 
  
Gait Abnormalities: Ataxic(very unsteady, multi-directional unsteadiness) Distance (ft): 100 Feet (ft) Assistive Device: Walker, rolling Ambulation - Level of Assistance: Minimal assistance Interventions: Safety awareness training;Verbal cues Duration: 15 Minutes Body Structures Involved: 1. Muscles Body Functions Affected: 1. Movement Related Activities and Participation Affected: 1. Mobility Number of elements that affect the Plan of Care: 3: MODERATE COMPLEXITY CLINICAL PRESENTATION:  
Presentation: Evolving clinical presentation with changing clinical characteristics: MODERATE COMPLEXITY CLINICAL DECISION MAKING:  
[employer] AM-PAC 6 Clicks Basic Mobility Inpatient Short Form How much difficulty does the patient currently have. .. Unable A Lot A Little None 1. Turning over in bed (including adjusting bedclothes, sheets and blankets)? ? 1   ? 2   ? 3   ? 4  
2. Sitting down on and standing up from a chair with arms ( e.g., wheelchair, bedside commode, etc.)   ? 1   ? 2   ? 3   ? 4  
3. Moving from lying on back to sitting on the side of the bed?   ? 1   ? 2   ? 3   ? 4 How much help from another person does the patient currently need. .. Total A Lot A Little None 4. Moving to and from a bed to a chair (including a wheelchair)? ? 1   ? 2   ? 3   ? 4  
5. Need to walk in hospital room? ? 1   ? 2   ? 3   ? 4  
6. Climbing 3-5 steps with a railing? ? 1   ? 2   ? 3   ? 4  
© 2007, Trustees of [facility] [employer], under license to [employer]. All rights reserved Score:  Initial: 14 Most Recent: X (Date: -- ) Interpretation of Tool:  Represents activities that are increasingly more difficult (i.e. Bed mobility, Transfers, Gait). Medical Necessity:    
· Patient is expected to demonstrate progress in strength, balance and functional technique ·  to decrease assistance required with bed mobility, transfers and gait · . Reason for Services/Other Comments: 
· Patient continues to require skilled intervention due to limited functional independence · . Use of outcome tool(s) and clinical judgement create a POC that gives a: Clear prediction of patient's progress: LOW COMPLEXITY  
  
 
 
 
TREATMENT:  
(In addition to Assessment/Re-Assessment sessions the following treatments were rendered) Pre-treatment Symptoms/Complaints:  \" feel like I am going to fall \" Pain: Initial: numeric scale Pain Intensity 1: 0  Post Session:  0/10 Gait Training (15 Minutes):  Gait training to improve and/or restore physical functioning as related to mobility, strength, balance, coordination and dynamic movement of leg - bilateral and core to improve functional gait. Ambulated 100 Feet (ft) with Minimal assistance using a Walker, rolling and minimal Safety awareness training;Verbal cues related to their stride length and heel strike to promote proper body alignment, promote proper body posture and promote proper body mechanics. Therapeutic Exercise: (26 Minutes):  Exercises :  standing balance static & dynamic (wt-shift) with & without UE support, isolated LE AROM with emphasis on eccentric muscle control to improve mobility, strength, balance, coordination and dynamic movement of leg - bilateral to improve functional muscle control. Required minimal verbal and manual cues to promote proper body alignment and promote proper body mechanics. Braces/Orthotics/Lines/Etc:  
· O2 Device: Nasal cannula Treatment/Session Assessment:   
· Response to Treatment:  Pt is motivated & hard working, Good rehab potential 
· Interdisciplinary Collaboration:  
o Registered Nurse 
o  · After treatment position/precautions:  
o Up in chair 
o Bed alarm/tab alert on 
o Bed/Chair-wheels locked 
o Caregiver at bedside 
o Call light within reach 
o RN notified 
o Family at bedside · Compliance with Program/Exercises: Will assess as treatment progresses · Recommendations/Intent for next treatment session: \"Next visit will focus on advancements to more challenging activities and reduction in assistance provided\". Total Treatment Duration: PT Patient Time In/Time Out Time In: 8865 Time Out: 4613 Troup Aleja, PT

## 2019-09-28 NOTE — PROGRESS NOTES
SW met with the patient and his wife to discuss discharge plan. Last note states patient is obs and did not qualify for inpatient at the time. SW discussed IRC at [facility] or [facility]. Patient receptive, wife worried about taking him home in current condition. CASSI spoke with MD who anticipates the patient being upgraded. CASSI met with the patient again to discuss STR. Patient's preference is for [facility] as 9th floor declined and [facility] is too far away. Referral sent. Wife to tour facility tomorrow. ZACHARY Parnell  [facility] [email or web address]

## 2019-09-28 NOTE — PROGRESS NOTES
Problem: Falls - Risk of 
Goal: *Absence of Falls Description Document Tanja Gregorio Fall Risk and appropriate interventions in the flowsheet. Outcome: Progressing Towards Goal 
Note:  
Fall Risk Interventions: 
Mobility Interventions: Bed/chair exit alarm, Patient to call before getting OOB, PT Consult for mobility concerns Mentation Interventions: Adequate sleep, hydration, pain control Medication Interventions: Bed/chair exit alarm, Patient to call before getting OOB Elimination Interventions: Bed/chair exit alarm, Call light in reach, Patient to call for help with toileting needs, Toilet paper/wipes in reach, Toileting schedule/hourly rounds History of Falls Interventions: Bed/chair exit alarm, Door open when patient unattended Problem: Nutrition Deficit Goal: *Optimize nutritional status Outcome: Progressing Towards Goal 
  
Problem: Patient Education: Go to Patient Education Activity Goal: Patient/Family Education Outcome: Progressing Towards Goal 
  
Problem: Patient Education: Go to Patient Education Activity Goal: Patient/Family Education Outcome: Progressing Towards Goal

## 2019-09-28 NOTE — PROGRESS NOTES
Shift assessment done. Pt alert and oriented to person, place but not time. Respirations even and unlabored. Abdomen soft with active bowel sounds. Denies needs and pain this time. Instructed to call for assistance when needed. Call light in reach. Bed alarm on. Bed low and locked. Will continue to monitor.

## 2019-09-29 NOTE — PROGRESS NOTES
Hospitalist Progress Note 2019 Admit Date: 2019  1:05 PM  
NAME: Grzegorz Cortez :  1945 MRN:  [id number] Attending: Godwin Cheung MD 
PCP:  Betty Farnsworth MD 
 
SUBJECTIVE:  
Patient is a 74yo M with hx HTN and R frontal ICH with subsequent seizure disorder who presented with worsening L leg weakness and confusion for one day. Has mild leg weakness and cognitive deficits after prior stroke. Takes keppra for seizure disorder. Not on asa at home due to bleeding from bladder after radiation/prostatectomy for prostate CA. 
 
:  
Neuro following, started phenytoin yesterday. Mental status worse this morning after having some progress yesterday. more confused, leg weak again. Review of Systems negative with exception of pertinent positives noted above PHYSICAL EXAM  
 
Visit Vitals /78 (BP 1 Location: Left arm, BP Patient Position: At rest) Pulse 72 Temp 98.6 °F (37 °C) Resp 18 Ht 5' 10\" (1.778 m) Wt 83.3 kg (183 lb 11.2 oz) SpO2 95% BMI 26.36 kg/m² Temp (24hrs), Av.8 °F (37.1 °C), Min:97.8 °F (36.6 °C), Max:100.9 °F (38.3 °C) Oxygen Therapy O2 Sat (%): 95 % (19 0818) Pulse via Oximetry: 72 beats per minute (19) O2 Device: Room air (19) O2 Flow Rate (L/min): 0 l/min (19) No intake or output data in the 24 hours ending 19 1137 General: No acute distress Lungs:  CTA Bilaterally. Heart:  Regular rate and rhythm,  No murmur, rub, or gallop Abdomen: Soft, Non distended, Non tender, Positive bowel sounds Extremities: No cyanosis, clubbing or edema Neurologic:  Alert, hard of hearing, answers questions appropriately, fully oriented. Slight weakness in left leg at baseline per patient. XR HIP LT W OR WO PELV 2-3 VWS Final Result IMPRESSION: Negative for acute fracture. MRI BRAIN WO CONT Final Result IMPRESSION: Remote right frontal lobe infarct with encephalomalacia and  
 hemosiderin staining. Extensive white matter changes, likely chronic small  
vessel disease. No acute infarction. CTA HEAD NECK W WO CONT Final Result Impression: 1. No evidence of intracranial large vessel occlusion. 2. No significant ICA stenosis however there is some mildly irregular soft  
plaque in the proximal right ICA. Ruddy Ace XR CHEST SNGL V Final Result IMPRESSION: Unremarkable portable chest x-ray. No acute abnormality evident. CT CODE NEURO HEAD WO CONTRAST Final Result IMPRESSION:  
1. No acute intracranial abnormality. Note, acute/subacute CVA cannot be  
excluded given the severity of the underlying white matter disease. Consider  
further evaluation with brain MRI to better evaluate for acute CVA. 2. Severe chronic white matter microangiopathic disease and cerebral atrophy. 3. Encephalomalacia in the right frontal lobe most in keeping with prior CVA. ASSESSMENT Active Hospital Problems Diagnosis Date Noted  Status epilepticus ([address] 09/28/2019  TIA (transient ischemic attack) 09/26/2019  Seizures ([address] 09/26/2019  
 History of CVA (cerebrovascular accident) 09/26/2019  
 HTN (hypertension) 09/26/2019  HLD (hyperlipidemia) 09/26/2019  CAD (coronary artery disease) 09/26/2019  
 History of prostatectomy 09/26/2019  
 History of prostate cancer 09/26/2019 Plan: 
 
Seizures:  
Neuro following, adjusting meds No signs new ischemic event. continue home lipitor, restarted ASA (prior discontinued due to bladder bleeding related to radiation) Aricept decreased per neuro reccs for anorexia Increased confusion/weakness this morning due to postictal state or subclinical seizure activity. Received ativan. 
  
Hypokalemia: replaced, monitor HTN: restared home nifedipine and lisinopril 
  
DVT Prophylaxis: HSQ Dispo: possible STR after control of seizures Signed By: Adela Fatima MD   
 September 29, 2019

## 2019-09-29 NOTE — PROGRESS NOTES
Pt complains of headache. PRN Tylenol 650 mg given PO per MD order. Safety measures in place, call light within reach, wife at bedside. Will continue to monitor.

## 2019-09-29 NOTE — PROGRESS NOTES
Pt c/o 10/10 back pain. Patient wanted to try Tylenol first. Tylenol 650mg tab given PO per MD order.

## 2019-09-29 NOTE — PROGRESS NOTES
Patient noted to be more confused this morning. When asked about what his name, patient only stare and did not answer at all. Talked to Dr. Catherine Kim and stated \"Let's just monitor and keep an eye on it. \"

## 2019-09-29 NOTE — PROGRESS NOTES
Shift assessment complete. Pt alert, unable to assess orientation level. When asked patient's name and birthday, pt with blank stare and no response. Pt does not answer any orientation questions or follow commands. NIH assessment complete with change of shift, AURORA Cochran, see flow sheets for full assessment. Total NIH score 6, will notify MD due to significant change in condition. Respirations even and unlabored. Lung sounds diminished on room air. HR regular, tele on per MD order. Abdomen soft with active bowel sounds heard in all four quadrants. Skin intact, no edema noted. Pt BUE weak. Pt also with drift noted to BLE, pt unable to keep BLE elevated. No visual signs of pain or distress. Safety measures in place, call light within reach, fall prevention devices intact, wife at bedside. Will continue to monitor.

## 2019-09-29 NOTE — PROGRESS NOTES
Per telemetry monitor, pt in asystole. Upon assessment, pt resting quietly in recliner. Apical HR 72. Leads replaced. No visual signs of pain or distress. Safety measures in place, call light within reach, wife at bedside. Will continue to monitor.

## 2019-09-29 NOTE — PROGRESS NOTES
NIH total score 6, this is a significant change from yesterday. Dr. Samantha Garibay made aware, no orders received at this time. Will continue to monitor.

## 2019-09-29 NOTE — PROGRESS NOTES
Problem: Mobility Impaired (Adult and Pediatric) Goal: *Acute Goals and Plan of Care (Insert Text) Description STG: 
(1.)Mr. Wilmer Rutherford will move from supine to sit and sit to supine  with CONTACT GUARD ASSIST within 4 treatment day(s). (2.)Mr. Wilmer Rutherford will transfer from bed to chair and chair to bed with MINIMAL ASSIST using the least restrictive device within 4 treatment day(s). Met 9/28 
(3.)Mr. Wilmer Rutherford will ambulate with MINIMAL ASSIST for 25-50 feet with the least restrictive device within 4 treatment day(s). Met 9/28 LTG: 
(1.)Mr. Wilmer Rutherford will move from supine to sit and sit to supine  in bed with STAND BY ASSIST within 7 treatment day(s). (2.)Mr. Wilmer Rutherford will transfer from bed to chair and chair to bed with STAND BY ASSIST using the least restrictive device within 7 treatment day(s). (3.)Mr. Wilmer Rutherford will ambulate with STAND BY ASSIST for 100 feet with the least restrictive device within 7 treatment day(s). ________________________________________________________________________________________________ Outcome: Progressing Towards Goal 
  
PHYSICAL THERAPY: Daily Note and AM 9/29/2019 INPATIENT: PT Visit Days : 3 [place] / [place] / Product Type: Medicare /   
  
NAME/AGE/GENDER: Kira Sanchez is a [place] y.o. male PRIMARY DIAGNOSIS: TIA (transient ischemic attack) [G45.9] Status epilepticus ([facility] [G40.901] TIA (transient ischemic attack) TIA (transient ischemic attack) ICD-10: Treatment Diagnosis:  
 · Generalized Muscle Weakness (M62.81) · Difficulty in walking, Not elsewhere classified (R26.2) Precaution/Allergies: 
Patient has no known allergies. ASSESSMENT:  
 
Mr. Wilmer Rutherford was less verbal, more flat, showed less visual tracking & was unable to take steps compared to yesterday walking 100 ft with minimal assist, today transfers were with moderate assist. RN aware & MD called & informed. This section established at most recent assessment PROBLEM LIST (Impairments causing functional limitations): 1. Decreased Strength 2. Decreased Transfer Abilities 3. Decreased Ambulation Ability/Technique 4. Decreased Balance 5. Increased Fatigue INTERVENTIONS PLANNED: (Benefits and precautions of physical therapy have been discussed with the patient.) 1. Bed Mobility 2. Gait Training 3. Therapeutic Exercise/Strengthening 4. Transfer Training TREATMENT PLAN: Frequency/Duration: daily for duration of hospital stay Rehabilitation Potential For Stated Goals: Good REHAB RECOMMENDATIONS (at time of discharge pending progress):   
Placement: It is my opinion, based on this patient's performance to date, that Mr. Brandy Quinonez may benefit from intensive therapy at an [address] [place] [address] after discharge due to a probable need for close medical supervision by a rehab physician, a probable need for 24 hour rehab nursing, a probable need for multiple therapy disciplines and potential to make ongoing and sustainable functional improvement that is of practical value. Tracy Walter Equipment: ? To be determined HISTORY:  
History of Present Injury/Illness (Reason for Referral): 
Patient is a 74yo M with hx R frontal ICH, seizures, and HTN who presents with worsening left leg weakness and confusion for one day. Pt reports feeling not right yesterday afternoon, followed by leg weakness and difficulty walking. From prior stroke, has some very mild deficits in leg coordination but wife reports strength normal at baseline. Communicates fluently at baseline. Has seizure disorder since ICH and takes keppra. Also with hx HTN, HLD. On atorvastatin and antihypertensives. Stopped taking ASA due to  bleeding after prostatectomy and radiation for prostate CA. At worst this morning, left leg was nearly flacid, now improving in strength since presentation. Has also had improvement in mental status but still responds inappropriately to some questions Past Medical History/Comorbidities:  
Mr. Yuli Haq  has a past medical history of Arthritis, CAD (coronary artery disease), Hypertension, Seizures ([address] and Stroke ([facility] Mr. Yuli Haq  has a past surgical history that includes hx orthopaedic and pr cardiac surg procedure unlist. 
Social History/Living Environment:  
Home Environment: Private residence One/Two Story Residence: Two story Living Alone: No 
Support Systems: Child(spencer), Family member(s) Patient Expects to be Discharged to[de-identified] Private residence Current DME Used/Available at Home: [employer], Cane, straight, Grab bars Prior Level of Function/Work/Activity: Was walking without assistive device and independently prior to admit. Number of Personal Factors/Comorbidities that affect the Plan of Care: 1-2: MODERATE COMPLEXITY EXAMINATION:  
Most Recent Physical Functioning:  
Gross Assessment: 3/-/5 throughout Balance: 
Sitting: Impaired; Without support(walker) Sitting - Static: (fair-) Sitting - Dynamic: (fair-) Standing: Impaired; With support(walker) Bed Mobility: 
Supine to Sit: Moderate assistance Sit to Supine: (NT) Scooting: Minimum assistance Transfers: 
Sit to Stand: Moderate assistance Stand to Sit: Moderate assistance Bed to Chair: Moderate assistance(with walker) Duration: 26 Minutes(extra time to work through activity noted) Gait: unable today Body Structures Involved: 1. Muscles Body Functions Affected: 1. Movement Related Activities and Participation Affected: 1. Mobility Number of elements that affect the Plan of Care: 3: MODERATE COMPLEXITY CLINICAL PRESENTATION:  
Presentation: Evolving clinical presentation with changing clinical characteristics: MODERATE COMPLEXITY CLINICAL DECISION MAKING:  
[employer] 6 Clicks Basic Mobility Inpatient Short Form How much difficulty does the patient currently have. .. Unable A Lot A Little None 1. Turning over in bed (including adjusting bedclothes, sheets and blankets)? ? 1   ? 2   ? 3   ? 4  
2. Sitting down on and standing up from a chair with arms ( e.g., wheelchair, bedside commode, etc.)   ? 1   ? 2   ? 3   ? 4  
3. Moving from lying on back to sitting on the side of the bed?   ? 1   ? 2   ? 3   ? 4 How much help from another person does the patient currently need. .. Total A Lot A Little None 4. Moving to and from a bed to a chair (including a wheelchair)? ? 1   ? 2   ? 3   ? 4  
5. Need to walk in hospital room? ? 1   ? 2   ? 3   ? 4  
6. Climbing 3-5 steps with a railing? ? 1   ? 2   ? 3   ? 4  
© 2007, Trustees of [address] [place] [address], under license to [employer]. All rights reserved Score:  Initial: 14 Most Recent: X (Date: -- ) Interpretation of Tool:  Represents activities that are increasingly more difficult (i.e. Bed mobility, Transfers, Gait). Medical Necessity:    
· Patient is expected to demonstrate progress in strength, balance and functional technique ·  to decrease assistance required with bed mobility, transfers and gait · . Reason for Services/Other Comments: 
· Patient continues to require skilled intervention due to limited functional independence · . Use of outcome tool(s) and clinical judgement create a POC that gives a: Clear prediction of patient's progress: LOW COMPLEXITY  
  
 
 
 
TREATMENT:  
(In addition to Assessment/Re-Assessment sessions the following treatments were rendered) Pre-treatment Symptoms/Complaints:  Pt was agreeable Pain: Initial: numeric scale Pain Intensity 1: 0  Post Session:  0/10 Therapeutic Activity: (  26 Minutes(extra time to work through activity noted) :   Therapeutic activities including bed mobility, sitting balance, wt shifting EOB, repeated sit<>stand, wt shifting with walker then performing stand pivot transfer to improve mobility, strength, balance, coordination and dynamic movement of arm - bilateral, leg - bilateral and core to improve functional core stability & WB potential.   
 
 
 
 
 
Braces/Orthotics/Lines/Etc:  
· O2 Device: Nasal cannula Treatment/Session Assessment:   
· Response to Treatment:  Pt showed a significant change in status today · Interdisciplinary Collaboration:  
o Registered Nurse 
o Physician 
o Rehabilitation Attendant · After treatment position/precautions:  
o Up in chair 
o Bed alarm/tab alert on 
o Bed/Chair-wheels locked 
o Caregiver at bedside 
o Call light within reach 
o RN notified 
o Family at bedside · Compliance with Program/Exercises: Will assess as treatment progresses · Recommendations/Intent for next treatment session: \"Next visit will focus on advancements to more challenging activities and reduction in assistance provided\". Total Treatment Duration: PT Patient Time In/Time Out Time In: 4408 Time Out: 5014 Shona Mack PT

## 2019-09-30 NOTE — PROGRESS NOTES
TRANSFER - IN REPORT: 
 
Verbal report received from MaximilianRN(name) on Guillermo Hermosillo  being received from ES(unit) for routine progression of care Report consisted of patients Situation, Background, Assessment and  
Recommendations(SBAR). Information from the following report(s) SBAR, Kardex, [facility] and Recent Results was reviewed with the receiving nurse. Opportunity for questions and clarification was provided. Assessment completed upon patients arrival to unit and care assumed.

## 2019-09-30 NOTE — DISCHARGE SUMMARY
Hospitalist Discharge Summary Patient ID: 
Joshua Zavala 
[id number] 
17 y.o. 
1945 Admit date: 9/26/2019  1:05 PM 
Discharge date and time: 9/30/2019 Attending: Stew Barger MD 
PCP:  Teto Pal MD 
Treatment Team: Attending Provider: Stew Barger MD; : Lisette Gonzalez; Consulting Provider: Howard Ventura MD; Utilization Review: Christopher Yanez RN; Care Manager: Finn Miller RN; Occupational Therapist: Eligio Silverio OT; : Kaylyn Blake, [place] Principal Diagnosis TIA (transient ischemic attack) Principal Problem: 
  TIA (transient ischemic attack) (9/26/2019) Active Problems: 
  Seizures ([address] (9/26/2019) History of CVA (cerebrovascular accident) (9/26/2019) HTN (hypertension) (9/26/2019) HLD (hyperlipidemia) (9/26/2019) CAD (coronary artery disease) (9/26/2019) History of prostatectomy (9/26/2019) History of prostate cancer (9/26/2019) Status epilepticus ([address] (9/28/2019) Hospital Course: 
Please refer to the admission H&P for details of presentation. In summary, the patient is a 72yo M with hx HTN and R frontal ICH and subsequent seizure disorder who presented with worsening L leg weakness and altered mental status. Initially evaluated for CVA vs seizures. CVA workup without acute stroke, but EEG showed \"active ictal tendency in the R hemisphere\" Neurology following and added phenytoin and tentatively planned to change keppra to zonisamide. The patient had some mild cognitive impairment after prior stroke but had good physical recovery and was dependant with ADLs and could ambulate long distances without assistance. The patient had increased confusion and LLE weakness on admission which have seemed to wax and wane some during admission.  There have been no observed seizures in hospital but there is concern for unnoticed or subclinical seizures and postictal state contributing to condition. Due to severely decreased functional capacity and need for ongoing management, will transfer to [facility]. Significant Diagnostic Studies: XR HIP LT W OR WO PELV 2-3 VWS Final Result IMPRESSION: Negative for acute fracture. MRI BRAIN WO CONT Final Result IMPRESSION: Remote right frontal lobe infarct with encephalomalacia and  
hemosiderin staining. Extensive white matter changes, likely chronic small  
vessel disease. No acute infarction. CTA HEAD NECK W WO CONT Final Result Impression: 1. No evidence of intracranial large vessel occlusion. 2. No significant ICA stenosis however there is some mildly irregular soft  
plaque in the proximal right ICA. Purdy Hind XR CHEST SNGL V Final Result IMPRESSION: Unremarkable portable chest x-ray. No acute abnormality evident. CT CODE NEURO HEAD WO CONTRAST Final Result IMPRESSION:  
1. No acute intracranial abnormality. Note, acute/subacute CVA cannot be  
excluded given the severity of the underlying white matter disease. Consider  
further evaluation with brain MRI to better evaluate for acute CVA. 2. Severe chronic white matter microangiopathic disease and cerebral atrophy. 3. Encephalomalacia in the right frontal lobe most in keeping with prior CVA. Labs: Results:  
   
Chemistry Recent Labs  
  09/30/19 
9300 09/29/19 
0539 09/28/19 
9093 GLU 95 106* 101*  140 141  
K 4.0 3.6 3.4*  
 103 104 CO2 32 31 30 BUN 19 17 17 CREA 0.92 0.93 0.92  
CA 9.3 9.2 9.5 AGAP 1* 6* 7 CBC w/Diff Recent Labs  
  09/30/19 
0516 09/29/19 
0439 09/28/19 
0526 WBC 5.2 5.8 5.4  
RBC 4.70 4.65 4.39  
HGB 12.4* 12.3* 11.7* HCT 39.4* 38.8* 37.2*  
 168 153 Cardiac Enzymes No results for input(s): CPK, CKND1, ALFREDA in the last 72 hours. No lab exists for component: Tony Chairez Coagulation No results for input(s): PTP, INR, APTT, INREXT in the last 72 hours. Lipid Panel Lab Results Component Value Date/Time Cholesterol, total 152 09/27/2019 04:57 AM  
 HDL Cholesterol 44 09/27/2019 04:57 AM  
 LDL, calculated 91.2 09/27/2019 04:57 AM  
 VLDL, calculated 16.8 09/27/2019 04:57 AM  
 Triglyceride 84 09/27/2019 04:57 AM  
 CHOL/HDL Ratio 3.5 09/27/2019 04:57 AM  
  
BNP No results for input(s): BNPP in the last 72 hours. Liver Enzymes No results for input(s): TP, ALB, TBIL, AP, SGOT, GPT in the last 72 hours. No lab exists for component: DBIL Thyroid Studies Lab Results Component Value Date/Time TSH 0.413 09/27/2019 04:57 AM  
    
 
 
Discharge Exam: 
Visit Arleen Pederson /80 Pulse 80 Temp 97.7 °F (36.5 °C) Resp 18 Ht 5' 10\" (1.778 m) Wt 83.3 kg (183 lb 11.2 oz) SpO2 93% BMI 26.36 kg/m² General:          No acute distress   
Lungs:             CTA Bilaterally. Heart:              Regular rate and rhythm,  No murmur, rub, or gallop Abdomen:        Soft, Non distended, Non tender, Positive bowel sounds Extremities:     No cyanosis, clubbing or edema Neurologic:       Alert, hard of hearing, answers questions appropriately, fully oriented. Slight weakness in left leg at baseline Disposition: [facility] Discharge Condition: stable Time spent to discharge patient 35 minutes Signed: 
Samina Rogers MD 
9/30/2019 
11:14 AM

## 2019-09-30 NOTE — PROGRESS NOTES
Pt resting in bed and is alert and oriented to self. he denies pain and is on room air. RR even and unlabored. Call light in reach and pt instructed to call for assistance if needed. Will monitor. Wife at bedside. Bed alarm on.

## 2019-09-30 NOTE — PROGRESS NOTES
CASSI spoke with Carmen Solomon at [facility] who states he does not have any male beds. CASSI spoke with Tricia at [facility] who will have a male bed on Tues. CASSI spoke with pt & spouse at bedside who are agreeable for CASSI to send referral to [place] [facility] [id number] ( as pvt room ) is requested. CASSI sent referral to both facilities via [address] [place] [address].

## 2019-09-30 NOTE — PROGRESS NOTES
Son at bedside, patient resting in bed. A/O to person and time. Respirations present, non-labored. Room air. Tele in place. No signs of distress noted. Safety measures in place. Will continue to monitor.

## 2019-09-30 NOTE — PROGRESS NOTES
CASE MANAGEMENT ASSESSMENT 
 
 
AGE:   66            DIAGNOSIS:    Neurologic Condition DPOA/ LIVING WILL:    Yes, wife encouraged to bring to hospital.  Lobo Godfrey is Serena  [phone] PCP? LAST VISIT? Jaylene Lee FAMILY ASSESSMENT:    Patient recently moved from [facility], had a CVA in 2018 with minor deficits with memory. Son is currently living with parents. CURRENT FAMILY SUPPORT:    Wife, son, grandchildren CURRENT LIVING ARRANGEMENTS:    Lives with wife, son and grandchildren live upstairs of the 2 story house. HOUSE/ APARTMENT/ TRAILER:    2 story house TUB-SHOWER COMBO OR WALK IN SHOWER:    Walk in shower, with bench seat. STEPS TO ENTER HOME:    High step from garage, one step from front door. INTERIOR STEPS:   7 steps then landing and another 4 steps PRIOR FUNCTION:    Independent except needs few cues, short term memory impaired ADL'S:    Independent except needs few cues, short term memory impaired DRIVING:    No 
 
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 Wife able to provide 24/7 care LEARNING CAPABILITY WITH PREFERRED METHOD OF LEARNING AND COMMUNITY REINTEGRATION POTENTIAL:    Good HOBBIES:  Golfer, TV, Puzzles, and Reads CM to follow patient Care Management Interventions PCP Verified by CM: Yes() Mode of Transport at Discharge: Self Transition of Care Consult (CM Consult): Discharge Planning Discharge Durable Medical Equipment: (Cane, walker, and rollator) Physical Therapy Consult: Yes Occupational Therapy Consult: Yes Speech Therapy Consult: Yes Current Support Network: Lives with Spouse, Own Home(Wife Hyun Hawley [phone], son is living with patient ) Confirm Follow Up Transport: Family Plan discussed with Pt/Family/Caregiver: Yes Freedom of Choice Offered: Yes [employer] Information Provided?: Yes Discharge Location Discharge Placement: Unable to determine at this time

## 2019-09-30 NOTE — PROGRESS NOTES
PHYSICAL THERAPY EXAMINATION 
TIME IN 1526 TIME OUT 1621 Patient Name: Mikki Eric Patient Age: 66 y.o. Past Medical History:  
Past Medical History:  
Diagnosis Date  Arthritis  CAD (coronary artery disease)  Hypertension  Seizures ([facility]  Stroke ([facility] Medical Diagnosis:  other neurologic condition <principal problem not specified> Precautions at Admission: Other (comment)(Fall precautions, seizures) Therapy Diagnosis:  
Difficulty with bed mobility  [x] Difficulty with functional transfers  [x] Difficulty with ambulation  [x] Difficulty with stair negotiations  [x] Problem List:   
Decreased strength B LE  [x] Decreased strength trunk/core  [x] Decreased AROM   [x] Decreased PROM  [] Decreased endurance  [x] Decreased balance sitting  [x] Decreased balance standing  [x] Pain   [] Slow ambulation velocity  [x] Decreased coordination  [x] Decreased safety awareness  [x] Functional Limitations:  
Decreased independence with bed mobility  [x] Decreased independence with functional transfers  [x] Decreased independence with ambulation  [x] Decreased independence with stair negotiation  [x] Previous Functional Level: Patient's wife reporting he was ambulating independently inside his home without a device and independent without household transfers. Reports independent with ADLs. Reports using a quad cane for gait outdoors if greater than a block. Wife manages medications. Wife reporting history of left neglect left sided weakness and cognitive deficits after prior CVA Home Environment: Home Environment: Private residence # Steps to Enter: 1(garage entrance, large step, smal step at front entrance) Rails to Enter: No 
Wheelchair Ramp: No 
One/Two Story Residence: Two story, live on 1st floor Lift Chair Available: No 
Living Alone: No 
Support Systems: Child(spencer), Spouse/Significant Other/Partner Patient Expects to be Discharged to[de-identified] Private residence(son lives with patient, pateint's wife is retired.) Current DME Used/Available at Home: Michael Byrd, quad, Walker, rollator, Walker, rolling Tub or Shower Type: Shower(shower door, built in seat) Outcome Measures:Vital Signs: 
Patient Vitals for the past 12 hrs: 
 Temp Pulse Resp BP SpO2  
19 1220 98.4 °F (36.9 °C) 68 20 162/67 94 % Pain level:No c/o pain during treatment, wife reporting patient has periodically been c/o left hip pain possibly sciatica pain, no c/o during assessment Pain location:NA Pain interventions:NA Patient education:PT POC and goals, Bed mobility training,transfer training, gait training, fall precautions, activity pacing, body mechanics,Patient with limited to no understanding of patient education. Recommend follow up education. Interdisciplinary Communication:spoke with RN regarding confusion, functional status, and urinary incontinence Cognition: Orientation:A+O x 1 Communication:able to express needs verbally, able to follow one step commands with multiple and frequent verbal cues Social Interaction:cooperating, appropriate with PT, participating, motivated to improve Problem Solving:difficulty with managing body mechanics during functional mobility due to balance and cognitive deficits Memory:Able to recall name and  and able to recognize wife. MMT Initial Asssessment: Difficult to assess due to difficulty following commands for MMT Right Lower Extremity Left Lower Extremity Hip Flexion 3- 3-  
Knee Extension 5 5 Knee Flexion 4- 4- Ankle Dorsiflexion 2- 2-  
0/5 No palpable muscle contraction 1/5 Palpable muscle contraction, no joint movement 2-/5 Less than full range of motion in gravity eliminated position 2/5 Able to complete full range of motion in gravity eliminated position 2+/5 Able to initiate movement against gravity 3-/5 More than half but not full range of motion against gravity 3/5 Able to complete full range of motion against gravity 3+/5 Completes full range of motion against gravity with minimal resistance 4-/5 Completes full range of motion against gravity with minimal-moderate resistance 4/5 Completes full range of motion against gravity with moderate resistance 4+/5 Completes full range of motion against gravity with moderate-maximum resistance 5/5 Completes full range of motion against gravity with maximum resistance AROM: bilateral LE hip flex 60, ABD 10, ankle DF -5 
 
FIM SCORES Initial Assessment Bed/Chair/Wheelchair Transfers 3 Wheelchair Mobility 0 Walking Jones 1 Steps/Stairs 0 PRIMARY MODE OF LOCOMOTION: AMBULATION Please see IRC Interdisciplinary Eval: Coordination/Balance Section for details regarding FIM score description. BED/CHAIR/WHEELCHAIR TRANSFERS Initial Assessment Rolling Right 4 (Minimal assistance) Rolling Left 4 (Minimal assistance) Supine to Sit 3 (Moderate assistance) Sit to Stand Moderate assistance Sit to Supine 3 (Moderate assistance) Transfer Assist Score 3 Transfer Type SPT without device Comments Increased time and effort to complete bed mobility and transfers. Patient using bed rail for rolling. Patient demonstrating left neglect with possible visual field deficit. Unable to assess vision due to confusion. Patient slow to respond to 1 step commands and requires multiple cues to complete bed mobility and transfers. SLow to sefl correct sitting balance and unable to correct without assistance Car Transfer Not tested Car Type WHEELCHAIR MOBILITY/MANAGEMENT Initial Assessment Able to Propel Functional Level 0 Curbs/ramps assistance required 0 (Not tested) Wheelchair set up assistance required 0 (Not tested) Wheelchair management WALKING INDEPENDENCE Initial Assessment Assistive device Other (comment)(parallel bars, gait belt) Ambulation assistance - level surface Distance 8 Feet (ft) Functional Level 1 Comments slow shuffling gait with narrow base of support, excessive hip ext rot and balance offset posterior. Slow to respond to commands with tendency to be easily distracted. patient incontinent of urine during gait training in parallel bars. Dependent assist to remove diaper and replace with pull up Ambulation assistance - unlevel surface 0 (Not tested) STEPS/STAIRS Initial Assessment Steps/Stairs ambulated 0 Rail Use Functional Level 0 Comments Unable to ambulate up/down steps due to cognitive status and impaired balance Curbs/Ramps 0 (Not tested) QUALITY INDICATOR ASSIST COMMENTS Walk 10 feet Not Tested: Not attempted due to balance deficits and cognitive deficits Walk 50 feet with 2 turns Not Tested: Not attempted due to medical concerns and balance deficits and cognitive deficits and fatigue Walk 150 feet Not Tested: Not attempted due to medical concerns and balance deficits and cognitive deficits and fatigue Walk 10 feet on uneven  Not Tested: Not attempted due to balance deficits and cognitive deficits 1 step/curb Not Tested: Not attempted due to balance deficits and cognitive deficits and fatigue 4 steps Not Tested: Not attempted due to balance deficits and cognitive deficits and fatigue 12 steps Not Tested: Not attempted due to balance deficits and cognitive deficits and fatigue  object Not Tested: Not attempted due to balance deficits and cognitive deficits Wheel 48' w/2 turns Not Tested: Not applicable secondary to pt not completing activity previously Wheel 150' Not Tested: Not applicable secondary to pt not completing activity previously PHYSICAL THERAPY PLAN OF CARE Therapy Diagnosis:   Please see table above Order received from MD for physical therapy services and chart reviewed. Pt to be seen at least 5 times per week for at least 1.5 hours of physical therapy per day for 2 weeks. Thank you for the referral. 
 
James Pinzon to care plan for STG and LTGs Pt would benefit from skilled physical therapy in order to improve independent functional mobility within the home. Interventions may include range of motion (AROM, PROM B LE/trunk), motor function (B LE/trunk strengthening/coordination), activity tolerance (vitals, oxygen saturation levels), bed mobility training, balance activities, gait training (progressive ambulation program), and functional transfer training. Please see IRC; Interdisciplinary Eval, Care Plan, and Patient Education for further information regarding physical therapy examination and plan of care. Patient returned to room at end of treatment. Patient supine in bed with head of bed elevated and bed rails up x 2. Needs placed in reach of patient. [place] alarm on and wife in room with patient Lalo Naylor, PT 
9/30/2019

## 2019-09-30 NOTE — H&P
HISTORY AND PHYSICAL IRC Admit Date: 9/30/2019 Primary Care Physician: Yehuda Vera MD 
Specialty Group:  Neurology, PMR Chief Complaint : Gait dysfunction secondary to below. Admit Diagnosis: TIA (transient ischemic attack) [G45.9] TIA (transient ischemic attack) (9/26/2019) Seizures ([address] (9/26/2019) History of CVA (cerebrovascular accident) (9/26/2019) HTN (hypertension) (9/26/2019) HLD (hyperlipidemia) (9/26/2019) CAD (coronary artery disease) (9/26/2019) History of prostatectomy (9/26/2019) History of prostate cancer (9/26/2019) 
left LE weakness 
weakness Pain DVT risk Acute Rehab Dx: LLE weakness. Mobility and ambulation deficits Self Care/ADL deficits Medical Dx: 
Past Medical History:  
Diagnosis Date  Arthritis  CAD (coronary artery disease)  Hypertension  Seizures ([facility]  Stroke ([facility] Date of Evaluation:  September 30, 2019 HPI: Karla Gann is a 66 y.o. male patient at [address] [place] [address] who was admitted on 9/26/2019  by Jerel Logan MD with below mentioned medical history ,is being seen for Physical Medicine and Rehabilitation consult.   
  
Karla Gann Patient was admitted with diagnosis of acute CVA and started on medical management for acute stroke. MRI showed remote right frontal lobe infarct with encephalomalacia and hemosiderin staining and extensive white matter changes, likely chronic small vessel disease. No acute infarction was reported. However patient continues to demonstrate left leg weakness that is worse than baseline. Patient noted to have severely elevated, fluctuating BP, managed closely. Patient is evaluated by neurology, who feel this may be secondary to seizures. Electroencephalogram reveals active ictal tendency in the right hemisphere.  Patient is started on phenytoin, in addition to [address] per Ronal Ramirez MD. Neurology to follow to make adjustments for anti seizure medications, possibly changing keppra to zonisamide. Patient has started to participate in therapies with acute PT, OT and ST. Patient shows significant left leg weakness, limiting his mobility, ambulation and ADLs. Per PT/OT he is reported to bee leaning heavily to the left at times, reports his left leg feels much weaker than normal. He is reported to be incontinent of bowel and bladder. Patient noted to be more confused, less verbal, more flat, showed less visual tracking & was unable to take steps compared to yesterday walking Patient still Patient is managing his transfers and gait with minimal/ moderate assist of 2.   
  
Our service was consulted for rehab needs and we recommended inpatient hospital rehabilitation is reasonable and necessary due to ongoing acute medical issues which have become worse since initial pre-admission evaluation. Patient now requires moderate assist  Of 2 for transfers and ambulation. Patient noted to be more confused. I reviewed the preadmission screening and have approved for admission to the [facility]. The patient was cleared for transfer to rehab today. Patient continues to have ongoing  medical issues outlined above requiring physician medical supervision and functional deficits which would benefit from continued intensive therapies. Current Level of Function: (evaluated by acute therapy staff, with bed mobility, transfers, balance personally observed post-admission in the IRF setting minutes prior to submission of document) bathing - mod A, lower body dressing - max A, toileting - max A, bed mobility - mod A, transfers- moderate A, poor balance , ambulation- short distances with RW, moderate, maximum assist.   
 
Prior Level of Function/Home Situation:  
 , lives with spouse in a two story home with 1 step to enter. Patient can reside in the first floor. Patient normally ambulated with cane at modified independent level. Past Medical History:  
Diagnosis Date  Arthritis  CAD (coronary artery disease)  Hypertension  Seizures ([facility] [address]  Stroke ([facility] [address] Past Surgical History:  
Procedure Laterality Date  CARDIAC SURG PROCEDURE UNLIST    
 one stent  HX ORTHOPAEDIC    
 knee surgeries No Known Allergies No family history on file. Social History Tobacco Use  Smoking status: Never Smoker  Smokeless tobacco: Never Used Substance Use Topics  Alcohol use: Not Currently Current Facility-Administered Medications Medication Dose Route Frequency  acetaminophen (TYLENOL) tablet 650 mg  650 mg Oral Q4H PRN  
 [START ON 10/1/2019] aspirin chewable tablet 81 mg  81 mg Oral DAILY  bisacodyl (DULCOLAX) tablet 5 mg  5 mg Oral DAILY PRN  
 labetalol (NORMODYNE;TRANDATE) injection 5 mg  5 mg IntraVENous Q10MIN PRN  
 sodium chloride (NS) flush 5-40 mL  5-40 mL IntraVENous Q8H  
 sodium chloride (NS) flush 5-40 mL  5-40 mL IntraVENous PRN  
 heparin (porcine) injection 5,000 Units  5,000 Units SubCUTAneous Q8H  
 atorvastatin (LIPITOR) tablet 40 mg  40 mg Oral QHS  [START ON 10/1/2019] donepezil (ARICEPT) tablet 5 mg  5 mg Oral DAILY  levETIRAcetam (KEPPRA) tablet 1,000 mg  1,000 mg Oral BID  lisinopril (PRINIVIL, ZESTRIL) tablet 20 mg  20 mg Oral BID  LORazepam (ATIVAN) injection 1 mg  1 mg IntraVENous PRN  
 magnesium oxide (MAG-OX) tablet 400 mg  400 mg Oral TID  NIFEdipine ER (PROCARDIA XL) tablet 60 mg  60 mg Oral BID  [START ON 10/1/2019] PARoxetine CR (PAXIL-CR) tablet 25 mg (Patient Supplied)  25 mg Oral DAILY  phenytoin ER (DILANTIN ER) ER capsule 200 mg  200 mg Oral BID  [START ON 10/1/2019] potassium chloride (K-DUR, KLOR-CON) SR tablet 20 mEq  20 mEq Oral DAILY Review of Systems: + seizure Denies: fevers, chills, sweats, fatigue, malaise, anorexia, weight loss Denies: blurry vision, loss of vision, eye pain, photophobia Denies: hearing loss, ringing in the ears, earache, epistaxis Denies: chest pain, palpitations, syncope, orthopnea, paroxysmal nocturnal dyspnea, claudication Denies: dysphagia, odynophagia, nausea, vomiting, diarrhea, constipation, abdominal pain, jaundice, melena Denies: frequency, dysuria, nocturia, urinary incontinence, stones, hematuria Denies: polydipsia/polyuria, skin changes, temperature intolerance, unexpected weight gain Denies: back pain, joint pain, joint swelling, muscle pain, muscle weakness Denies: bleeding problems, blood transfusions, bruising, pallor, swollen lymph nodes Denies: headache, dysarthria, blurred vision, diplopia,  
 
 
Objective:  
 
Visit Vitals /67 (BP 1 Location: Left arm, BP Patient Position: Post activity) Pulse 68 Temp 98.4 °F (36.9 °C) Resp 20 SpO2 94% Intake and Output: 
No intake/output data recorded. Physical Exam:  
    
General:   Alert, appears stated age, No acute distress. Has hearing aids. HEENT:  Normocephalic, EOMI, facial symmetry  Intact. Oral mucosa pink and moist. No nasal discharge. Lungs:  CTA Bilaterally,Respiration even and unlabored No apparent use of accessory muscles for respiration. No nasal alar flaring. Heart:  Regular rate and rhythm, S1, S2, No obvious murmur, click, rub or gallop. Genitourinary: defered Abdomen:  Soft, non-tender to palpation in all four quadrants. Bowel sounds present. No obvious masses palpated. Neuromuscular:  PERRL, EOMI 
LUE     Shoulder abduction  4+ /5 Elbow flexion:   5-/5 Wrist extension:   5-/5 Finger flexion;   5-/5 RUE    Shoulder abduction:5-  /5 Elbow flexion: 5  /5 Wrist extension: 5/5 Finger flexion: 5  /5 LLE     Hip flexion:  45 
            Knee extension:  4+ /5 Ankle dorsiflexion:  4+ /5 Ankle plantarflexion:  5-/5 RLE     Hip flexion:  5- /5 Knee extension:  4+ /5 Ankle dorsiflexion: 5-  /5 EHL:   /5 Ankle plantarflexion:   5-/5 Sensory - intact Plantars - down going Skin:  Intact, dry, good skin turgor, age related changes present Edema: none Lab Review:   
Recent Results (from the past 72 hour(s)) PLEASE READ & DOCUMENT PPD TEST IN 24 HRS Collection Time: 09/27/19  5:17 PM  
Result Value Ref Range PPD Negative Negative  
 mm Induration 0 0 - 5 mm CULTURE, URINE Collection Time: 09/27/19  8:29 PM  
Result Value Ref Range Special Requests: NO SPECIAL REQUESTS Culture result: (A)    
  10,000 to 50,000 COLONIES/mL GRAM NEGATIVE RODS IDENTIFICATION AND SUSCEPTIBILITY TO FOLLOW Culture result:     
  50,000-100,000 COLONIES/mL MIXED SKIN DOMINIC ISOLATED METABOLIC PANEL, BASIC Collection Time: 09/28/19  5:26 AM  
Result Value Ref Range Sodium 141 136 - 145 mmol/L Potassium 3.4 (L) 3.5 - 5.1 mmol/L Chloride 104 98 - 107 mmol/L  
 CO2 30 21 - 32 mmol/L Anion gap 7 7 - 16 mmol/L Glucose 101 (H) 65 - 100 mg/dL BUN 17 8 - 23 MG/DL Creatinine 0.92 0.8 - 1.5 MG/DL  
 GFR est AA >60 >60 ml/min/1.73m2 GFR est non-AA >60 >60 ml/min/1.73m2 Calcium 9.5 8.3 - 10.4 MG/DL  
CBC W/O DIFF Collection Time: 09/28/19  5:26 AM  
Result Value Ref Range WBC 5.4 4.3 - 11.1 K/uL  
 RBC 4.39 4.23 - 5.6 M/uL  
 HGB 11.7 (L) 13.6 - 17.2 g/dL HCT 37.2 (L) 41.1 - 50.3 % MCV 84.7 79.6 - 97.8 FL  
 MCH 26.7 26.1 - 32.9 PG  
 MCHC 31.5 31.4 - 35.0 g/dL  
 RDW 14.6 11.9 - 14.6 % PLATELET 814 358 - 908 K/uL MPV 9.3 (L) 9.4 - 12.3 FL ABSOLUTE NRBC 0.00 0.0 - 0.2 K/uL CBC W/O DIFF Collection Time: 09/29/19  4:39 AM  
Result Value Ref Range WBC 5.8 4.3 - 11.1 K/uL  
 RBC 4.65 4.23 - 5.6 M/uL  
 HGB 12.3 (L) 13.6 - 17.2 g/dL HCT 38.8 (L) 41.1 - 50.3 % MCV 83.4 79.6 - 97.8 FL  
 MCH 26.5 26.1 - 32.9 PG  
 MCHC 31.7 31.4 - 35.0 g/dL  
 RDW 14.6 11.9 - 14.6 % PLATELET 091 510 - 958 K/uL MPV 9.3 (L) 9.4 - 12.3 FL ABSOLUTE NRBC 0.00 0.0 - 0.2 K/uL METABOLIC PANEL, BASIC Collection Time: 09/29/19  4:39 AM  
Result Value Ref Range Sodium 140 136 - 145 mmol/L Potassium 3.6 3.5 - 5.1 mmol/L Chloride 103 98 - 107 mmol/L  
 CO2 31 21 - 32 mmol/L Anion gap 6 (L) 7 - 16 mmol/L Glucose 106 (H) 65 - 100 mg/dL BUN 17 8 - 23 MG/DL Creatinine 0.93 0.8 - 1.5 MG/DL  
 GFR est AA >60 >60 ml/min/1.73m2 GFR est non-AA >60 >60 ml/min/1.73m2 Calcium 9.2 8.3 - 10.4 MG/DL  
CBC W/O DIFF Collection Time: 09/30/19  5:16 AM  
Result Value Ref Range WBC 5.2 4.3 - 11.1 K/uL  
 RBC 4.70 4.23 - 5.6 M/uL  
 HGB 12.4 (L) 13.6 - 17.2 g/dL HCT 39.4 (L) 41.1 - 50.3 % MCV 83.8 79.6 - 97.8 FL  
 MCH 26.4 26.1 - 32.9 PG  
 MCHC 31.5 31.4 - 35.0 g/dL  
 RDW 14.6 11.9 - 14.6 % PLATELET 480 782 - 213 K/uL MPV 9.5 9.4 - 12.3 FL ABSOLUTE NRBC 0.00 0.0 - 0.2 K/uL METABOLIC PANEL, BASIC Collection Time: 09/30/19  5:16 AM  
Result Value Ref Range Sodium 140 136 - 145 mmol/L Potassium 4.0 3.5 - 5.1 mmol/L Chloride 107 98 - 107 mmol/L  
 CO2 32 21 - 32 mmol/L Anion gap 1 (L) 7 - 16 mmol/L Glucose 95 65 - 100 mg/dL BUN 19 8 - 23 MG/DL Creatinine 0.92 0.8 - 1.5 MG/DL  
 GFR est AA >60 >60 ml/min/1.73m2 GFR est non-AA >60 >60 ml/min/1.73m2 Calcium 9.3 8.3 - 10.4 MG/DL Functional Assessment: 
Bed Mobility: 
Supine to Sit: Moderate assistance Sit to Supine: (NT) Scooting: Minimum assistance 
  
Transfers: 
Sit to Stand: Moderate assistance Stand to Sit: Moderate assistance Bed to Chair: Moderate assistance(with walker) Duration: 26 Minutes(extra time to work through activity noted) Gait: unable today Condition on Admission: Good Assessment: The Post Assessment Physician Evaluation (ANITHA) found the current functional status to be comparable with the Pre-admission Screening. The Patient is a good candidate for acute inpatient rehabilitation.  Nothing since the Pre-admission screen has changed that determination. Rehabilitation Plan The patient has shown the ability to tolerate and benefit from 3 hours of therapy daily and is being admitted to a comprehensive acute inpatient rehabilitation program consisting of at least 3 hours of combined physical and occupational therapies. Begin intensive Physical Therapy for a minimum of 1.5 hours a day, at least 5 out of 7 days per week to address bed mobility, transfers, ambulation, strengthening, balance, and endurance. Begin intensive Occupational Therapy for a minimum of 1.5 hours a day, at least 5 out of 7 days per week to address ADL ( bathing, LE dressing, toileting) and adaptive equipment as needed. ST - cognitive assessment, post octal confusion. The patient will also require 24-hour skilled rehabilitation nursing for bowel and bladder management, skin care for decubitus ulcer prevention , pain management and ongoing medication administration Continue daily physician medical management: 
Seizures/ TIA/ encephalopathy 
- keppra 
- added Phenytoin ER. Will measure level in 1 week. History of CVA (cerebrovascular accident) (9/26/2019) 
- left LE weakness 
- lipitor 
- aspirin 81 
- aricept Hypertension - BP uncontrolled, fluctuating, managed medically. - continue lisinopril, procardia XL 
- BP remains high. Will adjust slowly. HLD (hyperlipidemia) (9/26/2019) 
- lipitor CAD (coronary artery disease) - cardiac precaution. Pneumonia prophylaxis- Insentive spirometer every hour while awake DVT risk / DVT Prophylaxis- Will require daily physician exam to assess for signs and symptoms as patient is at increased risk for of thromboembolism. Mobilization as tolerated. Intermittent pneumatic compression devices when in bed Thigh-high or knee-high thromboembolic deterrent hose when out of bed.  
- SCDs. History of prostatectomy/ History of prostate cancer (9/26/2019)/ Urinary retention/ neurogenic bladder - schedule voids q6-8 hrs. Check post-void residual every shift; In and Out catheterize if post-void residual is more than 400 cc. 
- incontinent  
 
 
bowel program - incontient GERD - resume PPI. At times may need additional antacids, Maalox prn. The patient's prognosis for significant practical improvement within a reasonable period of time appears good and the estimated length of stay is 14 days and patient is expected to return home with spouse and continued rehabilitation with home health therapy. Given the patient's complex neurologic/medical condition and the risk of further medical complications including: evidence of active sub clinical seizure activity, seizure medication added, requiring adjustment, monitoring of dilantin level, continued confusion, poor functional recovery, higher risk of DVT, PE, skin breakdown, pneumonia due to decreased mobility, hx of CVA, possible TIA/ CVA could potentially impact the IRF program. For these ongoing medical issues, rehabilitation services could not be safely provided at a lower level of care such as a skilled nursing facility or nursing home. Signed By: Braxton Michel MD   
 September 30, 2019

## 2019-09-30 NOTE — PROGRESS NOTES
Care Management Interventions Mode of Transport at Discharge: Other (see comment) Transition of Care Consult (CM Consult): Discharge Planning Physical Therapy Consult: Yes Occupational Therapy Consult: Yes Current Support Network: Lives with Spouse, Own Home Confirm Follow Up Transport: Other (see comment) Plan discussed with Pt/Family/Caregiver: Yes Freedom of Choice Offered: Yes Discharge Location Discharge Placement: Rehab hospital/unit acute SW receives call from Dr. Susie Mccormick who states pt is accepted to [facility]. Dr. Susie Mccormick and Julio Gregory spoke while CASSI spoke with spouse at bedside who is agreeable to [facility]. Per Dr. uJlio Gregory pt will transfer to [facility] today. CASSI received call from [address] on [facility] who request transport in about 1 hr.  Transport arranged to [facility] at [facility].

## 2019-10-01 PROBLEM — R53.1 WEAKNESS: Status: ACTIVE | Noted: 2019-01-01

## 2019-10-01 PROBLEM — G40.909 SEIZURE DISORDER (HCC): Status: ACTIVE | Noted: 2019-01-01

## 2019-10-01 PROBLEM — Z74.09 IMPAIRED FUNCTIONAL MOBILITY, BALANCE, GAIT, AND ENDURANCE: Status: ACTIVE | Noted: 2019-01-01

## 2019-10-01 PROBLEM — G93.40 ENCEPHALOPATHY: Status: ACTIVE | Noted: 2019-01-01

## 2019-10-01 NOTE — PROGRESS NOTES
SFD PROGRESS NOTE Clearance Ace Admit Date: 9/30/2019 Admit Diagnosis: Seizure disorder ([facility] [address] [Y67.554]; Weakness [R53.1]; Encephalopathy [G93.40];CAD (coronary artery disease) [I25.10]; History of CVA (cerebrovascular accident) [Z86.73]; Impaired functional mobility, balance, gait, and endurance [Z74.09] Subjective  
 
Vss. Afebrile. Patient resumed acute PT/OT in [facility]. Patient remains an extremely high risk for falls due to poor balance reflexes and poor safety awareness. Objective:  
 
Current Facility-Administered Medications Medication Dose Route Frequency  acetaminophen (TYLENOL) tablet 650 mg  650 mg Oral Q4H PRN  
 aspirin chewable tablet 81 mg  81 mg Oral DAILY  bisacodyl (DULCOLAX) tablet 5 mg  5 mg Oral DAILY PRN  
 sodium chloride (NS) flush 5-40 mL  5-40 mL IntraVENous Q8H  
 sodium chloride (NS) flush 5-40 mL  5-40 mL IntraVENous PRN  
 heparin (porcine) injection 5,000 Units  5,000 Units SubCUTAneous Q8H  
 donepezil (ARICEPT) tablet 5 mg  5 mg Oral DAILY  levETIRAcetam (KEPPRA) tablet 1,000 mg  1,000 mg Oral BID  lisinopril (PRINIVIL, ZESTRIL) tablet 20 mg  20 mg Oral BID  magnesium oxide (MAG-OX) tablet 400 mg  400 mg Oral TID  NIFEdipine ER (PROCARDIA XL) tablet 60 mg  60 mg Oral BID  PARoxetine CR (PAXIL-CR) tablet 25 mg (Patient Supplied)  25 mg Oral DAILY  phenytoin ER (DILANTIN ER) ER capsule 200 mg  200 mg Oral BID  traZODone (DESYREL) tablet 25 mg  25 mg Oral QHS PRN  
 guaiFENesin (ROBITUSSIN) 100 mg/5 mL oral liquid 100 mg  100 mg Oral Q6H PRN  
 atorvastatin (LIPITOR) tablet 40 mg  40 mg Oral QHS Review of Systems:Denies chest pain, shortness of breath, cough, headache, visual problems, abdominal pain, dysurea, calf pain. Pertinent positives are as noted in the medical records and unremarkable otherwise. Visit Vitals /82 Pulse 66 Temp 98.4 °F (36.9 °C) Resp 18 SpO2 91% Physical Exam:  
     
 General:   Alert, appears stated age, No acute distress. Has hearing aids. HEENT:  Normocephalic, EOMI, facial symmetry  Intact. Oral mucosa pink and moist. No nasal discharge. Lungs:  CTA Bilaterally,Respiration even and unlabored No apparent use of accessory muscles for respiration. No nasal alar flaring. Heart:  Regular rate and rhythm, S1, S2, No obvious murmur, click, rub or gallop. Genitourinary: defered Abdomen:  Soft, non-tender to palpation in all four quadrants. Bowel sounds present. No obvious masses palpated. Neuromuscular:  PERRL, EOMI 
LUE     Shoulder abduction  4+ /5 Elbow flexion:   5-/5 Wrist extension:   5-/5 Finger flexion;   5-/5 RUE    Shoulder abduction:5-  /5 Elbow flexion: 5  /5 Wrist extension: 5/5 Finger flexion: 5  /5 LLE     Hip flexion:  45 
            Knee extension:  4+ /5 Ankle dorsiflexion:  4+ /5 Ankle plantarflexion:  5-/5 RLE     Hip flexion:  5- /5 Knee extension:  4+ /5 Ankle dorsiflexion: 5-  /5 EHL:   /5 Ankle plantarflexion:   5-/5 Sensory - intact Plantars - down going 
   
Skin:  Intact, dry, good skin turgor, age related changes present Edema: none Functional Assessment: 
 
Balance Sitting - Static: Fair (occasional) (10/01/19 1200) Sitting - Dynamic: Fair (occasional) (10/01/19 1200) Standing - Static: Poor (10/01/19 1200) Standing - Dynamic : Impaired (10/01/19 1200) [place] Stade Fall Risk Assessment: 
Kateryna Reyes Risk Mobility: Ambulates or transfers with assist devices or assistance (10/01/19 0718) Mobility Interventions: Bed/chair exit alarm;OT consult for ADLs; Patient to call before getting OOB;Utilize walker, cane, or other assistive device (10/01/19 18) Mentation: Periodic confusion (10/01/19 0718) Mentation Interventions: Bed/chair exit alarm; Door open when patient unattended;Family/sitter at bedside; Toileting rounds (10/01/19 0600) Medication: Patient receiving anticonvulsants, sedatives(tranquilizers), psychotropics or hypnotics, hypoglycemics, narcotics, sleep aids, antihypertensives, laxatives, or diuretics (10/01/19 0718) Medication Interventions: Bed/chair exit alarm; Patient to call before getting OOB (10/01/19 7746) Elimination: Incontinence (10/01/19 2714) Elimination Interventions: Bed/chair exit alarm;Call light in reach; Patient to call for help with toileting needs (10/01/19 5512) Prior Fall History: Before admission in past 12 months _home or previous inpatient care) (10/01/19 0061) History of Falls Interventions: Bed/chair exit alarm; Door open when patient unattended (10/01/19 0237) Total Score: 5 (10/01/19 4341) Standard Fall Precautions: Yes (09/30/19 2039) High Fall Risk: Yes (10/01/19 4236) Speech Assessment: 
Aspiration Signs/Symptoms: None (10/01/19 1103) Ambulation: 
Gait Distance (ft): 120 Feet (ft)(x1, 70'x1) (10/01/19 1200) Assistive Device: Gait belt; Other (comment)(B HHA) (10/01/19 1200) Labs/Studies: 
Recent Results (from the past 72 hour(s)) CBC W/O DIFF Collection Time: 09/29/19  4:39 AM  
Result Value Ref Range WBC 5.8 4.3 - 11.1 K/uL  
 RBC 4.65 4.23 - 5.6 M/uL  
 HGB 12.3 (L) 13.6 - 17.2 g/dL HCT 38.8 (L) 41.1 - 50.3 % MCV 83.4 79.6 - 97.8 FL  
 MCH 26.5 26.1 - 32.9 PG  
 MCHC 31.7 31.4 - 35.0 g/dL  
 RDW 14.6 11.9 - 14.6 % PLATELET 185 293 - 293 K/uL MPV 9.3 (L) 9.4 - 12.3 FL ABSOLUTE NRBC 0.00 0.0 - 0.2 K/uL METABOLIC PANEL, BASIC Collection Time: 09/29/19  4:39 AM  
Result Value Ref Range Sodium 140 136 - 145 mmol/L Potassium 3.6 3.5 - 5.1 mmol/L Chloride 103 98 - 107 mmol/L  
 CO2 31 21 - 32 mmol/L Anion gap 6 (L) 7 - 16 mmol/L Glucose 106 (H) 65 - 100 mg/dL BUN 17 8 - 23 MG/DL Creatinine 0.93 0.8 - 1.5 MG/DL  
 GFR est AA >60 >60 ml/min/1.73m2 GFR est non-AA >60 >60 ml/min/1.73m2 Calcium 9.2 8.3 - 10.4 MG/DL  
CBC W/O DIFF Collection Time: 09/30/19  5:16 AM  
Result Value Ref Range WBC 5.2 4.3 - 11.1 K/uL  
 RBC 4.70 4.23 - 5.6 M/uL  
 HGB 12.4 (L) 13.6 - 17.2 g/dL HCT 39.4 (L) 41.1 - 50.3 % MCV 83.8 79.6 - 97.8 FL  
 MCH 26.4 26.1 - 32.9 PG  
 MCHC 31.5 31.4 - 35.0 g/dL  
 RDW 14.6 11.9 - 14.6 % PLATELET 563 448 - 486 K/uL MPV 9.5 9.4 - 12.3 FL ABSOLUTE NRBC 0.00 0.0 - 0.2 K/uL METABOLIC PANEL, BASIC Collection Time: 09/30/19  5:16 AM  
Result Value Ref Range Sodium 140 136 - 145 mmol/L Potassium 4.0 3.5 - 5.1 mmol/L Chloride 107 98 - 107 mmol/L  
 CO2 32 21 - 32 mmol/L Anion gap 1 (L) 7 - 16 mmol/L Glucose 95 65 - 100 mg/dL BUN 19 8 - 23 MG/DL Creatinine 0.92 0.8 - 1.5 MG/DL  
 GFR est AA >60 >60 ml/min/1.73m2 GFR est non-AA >60 >60 ml/min/1.73m2 Calcium 9.3 8.3 - 10.4 MG/DL  
CBC W/O DIFF Collection Time: 10/01/19  6:24 AM  
Result Value Ref Range WBC 6.2 4.3 - 11.1 K/uL  
 RBC 4.39 4.23 - 5.6 M/uL  
 HGB 11.8 (L) 13.6 - 17.2 g/dL HCT 37.7 (L) 41.1 - 50.3 % MCV 85.9 79.6 - 97.8 FL  
 MCH 26.9 26.1 - 32.9 PG  
 MCHC 31.3 (L) 31.4 - 35.0 g/dL  
 RDW 14.6 11.9 - 14.6 % PLATELET 067 749 - 984 K/uL MPV 9.5 9.4 - 12.3 FL ABSOLUTE NRBC 0.00 0.0 - 0.2 K/uL MAGNESIUM Collection Time: 10/01/19  6:24 AM  
Result Value Ref Range Magnesium 2.4 1.8 - 2.4 mg/dL METABOLIC PANEL, BASIC Collection Time: 10/01/19  6:24 AM  
Result Value Ref Range Sodium 146 (H) 136 - 145 mmol/L Potassium 3.8 3.5 - 5.1 mmol/L Chloride 108 (H) 98 - 107 mmol/L  
 CO2 30 21 - 32 mmol/L Anion gap 8 7 - 16 mmol/L Glucose 92 65 - 100 mg/dL BUN 20 8 - 23 MG/DL Creatinine 0.99 0.8 - 1.5 MG/DL  
 GFR est AA >60 >60 ml/min/1.73m2 GFR est non-AA >60 >60 ml/min/1.73m2 Calcium 9.0 8.3 - 10.4 MG/DL Assessment:  
 
Problem List as of 10/1/2019 Date Reviewed: 10/1/2019 Codes Class Noted - Resolved Weakness ICD-10-CM: R53.1 ICD-9-CM: 780.79  10/1/2019 - Present Encephalopathy ICD-10-CM: G93.40 ICD-9-CM: 348.30  10/1/2019 - Present [facility] disorder ([facility] ICD-10-CM: G40.909 ICD-9-CM: 345.90  10/1/2019 - Present Impaired functional mobility, balance, gait, and endurance ICD-10-CM: Z74.09 
ICD-9-CM: V49.89  10/1/2019 - Present Status epilepticus ([facility] ICD-10-CM: Navid Fishman ICD-9-CM: 345.3  9/28/2019 - Present  
   
 TIA (transient ischemic attack) ICD-10-CM: G45.9 ICD-9-CM: 435.9  9/26/2019 - Present Seizures ([facility] ICD-10-CM: R56.9 ICD-9-CM: 780.39  9/26/2019 - Present History of CVA (cerebrovascular accident) ICD-10-CM: Z86.73 
ICD-9-CM: V12.54  9/26/2019 - Present HTN (hypertension) ICD-10-CM: I10 
ICD-9-CM: 401.9  9/26/2019 - Present HLD (hyperlipidemia) ICD-10-CM: E78.5 ICD-9-CM: 272.4  9/26/2019 - Present CAD (coronary artery disease) ICD-10-CM: I25.10 ICD-9-CM: 414.00  9/26/2019 - Present History of prostatectomy ICD-10-CM: Z90.79 ICD-9-CM: V45.89  9/26/2019 - Present History of prostate cancer ICD-10-CM: Z85.46 
ICD-9-CM: V10.46  9/26/2019 - Present Plan:  
 
Rehabilitation Plan The patient has shown the ability to tolerate and benefit from 3 hours of therapy daily and is being admitted to a comprehensive acute inpatient rehabilitation program consisting of at least 3 hours of combined physical and occupational therapies. Continue intensive Physical Therapy for a minimum of 1.5 hours a day, at least 5 out of 7 days per week to address bed mobility, transfers, ambulation, strengthening, balance, and endurance.   
Begin intensive Occupational Therapy for a minimum of 1.5 hours a day, at least 5 out of 7 days per week to address ADL ( bathing, LE dressing, toileting) and adaptive equipment as needed. 
  
ST - cognitive assessment, post ictal confusion. deficits include impaired reasoning, attention, expressive language, auditory processing for following directions, and immediate and short-term memory per ST. 
  
The patient will also require 24-hour skilled rehabilitation nursing for bowel and bladder management, skin care for decubitus ulcer prevention , pain management and ongoing medication administration  
  
  
Continue daily physician medical management: 
Seizures/ TIA/ encephalopathy 
- keppra 
- added Phenytoin ER. Will measure level in 1 week. - 10/1 monitor. Seizure precautions.  
  
  
History of CVA (cerebrovascular accident) (9/26/2019) 
- left LE weakness 
- lipitor 
- aspirin 81 
- aricept 
  
  
Hypertension - BP uncontrolled, fluctuating, managed medically. - continue lisinopril, procardia XL 
- -160s. Will monitor.  
  
HLD (hyperlipidemia) (9/26/2019) 
- lipitor 
  
CAD (coronary artery disease) - cardiac precaution.  
  
Pneumonia prophylaxis- Insentive spirometer every hour while awake 
  
DVT risk / DVT Prophylaxis-   
- SCDs. History of prostatectomy/ History of prostate cancer (9/26/2019)/ Urinary retention/ neurogenic bladder --  
scheulde voids q2h. As pt incontinent. - Check post-void residual every shift; In and Out catheterize if post-void residual is more than 400 cc. 
  
 
bowel program - incontinent 
  
GERD - resume PPI. At times may need additional antacids, Maalox prn. 
  
  
 
Time spent was 25 minutes with over 1/2 in direct patient care/examination, consultation and coordination of care. Signed By: Austyn Benavides MD   
 October 1, 2019

## 2019-10-01 NOTE — PROGRESS NOTES
Interdisciplinary Conference Notes Interdisciplinary conference completed to discuss plan of care. Estimated D/C Date: Deferred Recommended Follow-Up Therapy:   Not at this time Communication with family/caregivers: Spoke with wife yesterday and she is aware he would be possibly be deferred. Continue with therapies til next conference.

## 2019-10-01 NOTE — PROGRESS NOTES
PHYSICAL THERAPY DAILY NOTE Time In: 1347 Time Out: 6198 Patient Seen For: PM;Other (see progress notes);Gait training; Therapeutic exercise;Transfer training Subjective: Patient had no complaints. Objective: No pain noted. Seizure, Other (comment)(Fall precautions, ) GROSS ASSESSMENT Daily Assessment COGNITION Daily Assessment BED/MAT MOBILITY Daily Assessment Rolling Right : 0 (Not tested) Rolling Left : 0 (Not tested) Supine to Sit : 0 (Not tested) Sit to Supine : 5 (Supervision) TRANSFERS Daily Assessment Transfer Type: SPT without device Transfer Assistance : 3 (Moderate assistance ) Sit to Stand Assistance: Moderate assistance Car Transfers: Not tested GAIT Daily Assessment Patient leans forward and to the right . He is impulsive with all mobility. Amount of Assistance: 3 (Moderate assistance) Distance (ft): 100 Feet (ft) Assistive Device: Gait belt; Other (comment)(HHA x 1 ) STEPS or STAIRS Daily Assessment Steps/Stairs Ambulated (#): 0 Level of Assist : 0 (Not tested) BALANCE Daily Assessment Sitting - Static: Fair (occasional) Sitting - Dynamic: Fair (occasional) Standing - Static: Poor Standing - Dynamic : Impaired WHEELCHAIR MOBILITY Daily Assessment Able to Propel (ft): 0 feet Curbs/Ramps Assist Required (FIM Score): 0 (Not tested) Wheelchair Setup Assist Required : 0 (Not tested) LOWER EXTREMITY EXERCISES Daily Assessment Extremity: Both Exercise Type #1: Other (comment)(nustep x 10 minutes) Sets Performed: 1 Reps Performed: 10 Level of Assist: Supervision Exercise Type #2: Other (comment)(in II bars standing exs) Sets Performed: 2 Reps Performed: 10 Level of Assist: Minimal assistance Assessment: Patient seems very motivated with therapy. Plan of Care: Continue with plan of care. Kevin Diamond, COLE 
10/1/2019

## 2019-10-01 NOTE — PROGRESS NOTES
[facility] OCCUPATIONAL THERAPY INITIAL EVALUATION Time In: 0830 Time Out: 1001 Precautions: Falls, Bed Alarm, Poor Safety Awareness, Confused, Impulsive and seizure Vitals:  
Patient Vitals for the past 8 hrs: 
 Temp Pulse Resp BP SpO2  
10/01/19 0729 98.4 °F (36.9 °C) 66 18 158/82 91 % Pain: Patient rated pain 0 out of 10. History of Presenting Illness (copied from previous physician reports): Patient was admitted with diagnosis of acute CVA and started on medical management for acute stroke. MRI showed remote right frontal lobe infarct with encephalomalacia and hemosiderin staining and extensive white matter changes, likely chronic small vessel disease. No acute infarction was reported. However patient continues to demonstrate left leg weakness that is worse than baseline.  Patient noted to have severely elevated, fluctuating BP, managed closely. Patient is evaluated by neurology, who feel this may be secondary to seizures. Electroencephalogram reveals active ictal tendency in the right hemisphere. Patient is started on phenytoin, in addition to [address] per Majo Olivo MD. Neurology to follow to make adjustments for anti seizure medications, possibly changing keppra to zonisamide. Patient has started to participate in therapies with acute PT, OT and ST. Patient shows significant left leg weakness, limiting his mobility, ambulation and ADLs. Per PT/OT he is reported to bee leaning heavily to the left at times, reports his left leg feels much weaker than normal. He is reported to be incontinent of bowel and bladder. Patient noted to be more confused, less verbal, more flat, showed less visual tracking & was unable to take steps compared to yesterday walking  
  
Patient still Patient is managing his transfers and gait with minimal/ moderate assist of 2.   
 
Past Medical History/ Co-morbidities:  
Past Medical History:  
Diagnosis Date  Arthritis  CAD (coronary artery disease)  Hypertension  Seizures ([facility] [address]  Stroke ([facility] [address] Patient's Goal: Unable to state. Attempted 5 times, asking question 5 different ways; still unable to state due to impaired attention and cognition. Previous Level of Function: Per wife, patient was previously supervision to independent with all ADL tasks. Patient ambulated without AE in the home and with a quad cane in the community. Patient completed light IADL such as washing dishes; wife completed all other IADL tasks including medication and financial management. Home Situation Environment Comments House Situation Private residence Lives Alone No   
Support Systems Spouse/Significant Other/Partner, Child(spencer) Shower Situation Shower(has doors and built in seat ) Current DME Walker, rolling, Walker, rollator, [address] [facility], [place], quad Lift Chair No   
Stairs to Enter 1 Rails W/C Ramp No   
Interior Steps Upper Extremity Function [facility]HAJI OBRIEN [facility]  Impaired  Impaired [facility]  Intact  Intact Light Touch (unable to test due to cognition) No apparent deficit(unable to test due to cognition ) Sharp/Dull Discrimination Not tested Not tested Hot/Cold Not tested Not tested Proprioception Partial deficit Partial deficit Stereognosis Severe deficit(may be due to cognition) Severe deficit(may be due to cognition ) 9 Hole Peg Test (unable to test due to cognition) (unable to test due to cognition ) General Comments ALHAJI OBRIEN General Evalutaion AROM Within defined limits Within defined limits Shoulder Flexion 4+ 4+ Shoulder Extension  4+ 4+ Shoulder Abduction 4+ 4+ Shoulder Adduction 4+ 4+ Elbow Flexion 4+ 4+ Elbow Extension  4+ 4+ Wrist Flexion/Extension  4 4+ General Comments 0/5 No palpable muscle contraction 1/5 Palpable muscle contraction, no joint movement 2-/5 Less than full range of motion in gravity eliminated position 2/5 Able to complete full range of motion in gravity eliminated position 2+/5 Able to initiate movement against gravity 3-/5 More than half but not full range of motion against gravity 3/5 Able to complete full range of motion against gravity 3+/5 Completes full range of motion against gravity with minimal resistance 4-/5 Completes full range of motion against gravity with minimal-moderate resistance 4/5 Completes full range of motion against gravity with moderate resistance 4+/5 Completes full range of motion against gravity with moderate-maximum resistance 5/5 Completes full range of motion against gravity with maximum resistance Functional Mobility Performance Comments Sitting: Static Poor (constant support)(unable to maintain upright sitting,leans posterior,) Sitting: Dynamic Poor (constant support) Standing: Static Poor Standing: Dynamic Poor (constant support) Supine to Sit 3 (Moderate assistance) Sit to Stand Assistance Moderate assistance Transfer Assist Score 3 Transfer Type SPT without device Cognition Performance Comments Orientation Level Oriented to person, Disoriented to place, Disoriented to situation, Disoriented to time Comprehension Level   Mode: Auditory Hearing Aid:Bilateral 
Glasses:   
Expression (Native Language)   Mode:   
   
Social Interaction/Pragmatics Problem Solving Memory Comments Mobility Score Comments Supine to Sit 4: Supervision or touching A Sit to Supine NA due to patient did not get back into bed Transfer Assist 2: Substantial/Maximal A Transfer Type: SPT Equipment: Rolling Walker and [employer] Comments:   
 
Activities of Daily Living Score Comments Eating patient had already completed breakfast on arrival to room Oral Hygiene 5: S/U or clean-up assist Brushed teeth only, moderate cueing, mild perseveration Bathing 3: Partial/Moderate A Type of Shower: Bath Pack Position: Supported sitting, Standing PRN and Unsupported Sitting Adaptive  Equipment: bath pack Comments: impaired sitting and standing balance Upper Body Dressing 4: Supervision or touching A Items Applied: Pullover Position: Unsupported Sitting Comments: donned shirt backwards; total A for identifying and fixing problem that shirt had been initially donned backwards Lower Body Dressing 2: Substantial/Maximal A Items Applied: Fasten Pants Position: Supported sitting and Standing PRN Adaptive Equipment: RW and W/C Comments: max A for standing balance Donning/[place] Footwear 4: Supervision or touching A Items Applied: Socks and Slip-on Shoes Adaptive Equipment:  
Comments: Toilet Transfer Not Tested: Not attempted due to safety concerns; patient was incontinent of urine as soon as he stood up, negating the need for toilet transfer Toileting Hygiene 1: Dependent Output: incontinent of urine Comments:   
Education Vision/Perception: Formal testing needs to be completed. May be difficult to assess due to cognition. Instrumental Activities of Daily Living Performance Comments Meal Preperation   Total A Homemaking   Total A Medication Management   Total A Financial Management   Total A Session: Patient long sitting on arrival to room. Agreeable to OT evaluation. Patient's wife present for history interview and provided all history. Patient completed sponge bath seated up at edge of bed; see above for details. Completed BUE assessment; see above for details. Patient completed cognitive task organizing 12 shapes with minimal errors that patient was able to identify and correct with moderate cueing. Attempted stereognosis testing; patient unable to accurately complete due to cognition. Patient transferred [facility] to [facility] using [facility] with max A.   
 
Patient tolerated session well with no complaint of pain and was left seated up in recliner with call light/all needs in reach. Chair alarm activated. Interdisciplinary Communication: with PT regarding estimated LOS and functional status, with nurse and physician regarding urine incontinence, with nurse regarding patient left sitting up in [address] at end of session Patient/Family Education: Patient was/were educated On the role of OT, On POC, On IRC expectations and use of call bell, yellow sock policy. Problem List: [facility], [address], Activity Tolerance, Visual Perceptual , Safety Awareness, Strength, Sitting Balance, Standing Balance, Abnormal Muscle Tone and Cognition Functional Limitations: ADL, IADL, Functional Transfers and Functional Mobility Goals: Please see Care Plan OT order received and chart reviewed. OT orders have been acknowledged. Patient will benefit from skilled OT services to address ADL, functional transfers, UE strength, UE coordination, balance, cognition and activity tolerance to maximize functional performance with daily self-care tasks and functional mobility. Treatment is likely to include ADL, Balance, Strength, Activity tolerance, Neuro-muscular re-education, Visual perceptual, Cognitive, DME, AE, Family  and Safety awareness training to increase independence with self-care. Patient will be seen for 1.5-2 hours of skilled OT services 5-6 days a week. Ramonita Beckwith MS, OTR/L 
10/1/2019

## 2019-10-01 NOTE — PROGRESS NOTES
10/01/19 1100 Time With Patient Time In 1018 Time Out 1058 Cognition/Perception Orientation Level Oriented to person;Disoriented to place;Oriented to situation;Disoriented to time Comprehension Mode Primary Mode of Comprehension Auditory Score 3 Expression (Native Language) Primary Mode of Expression Verbal  
Score 4 Social Interaction/Pragmatics Score 3 Problem Solving Score 2 Memory Score 1  
 
 10/01/19 1101 Neuro-Linguistic Exercises Verbal Reasoning Tasks Impaired; 2/8 Verbal Problem Solving Impaired; 2/8 Mathematical Impaired; 1/4 basic Memory Impaired; immediate 1/4 Second attempt 2/4 Short-term 0/4 Attention  Impaired; 4/8 digit span  
 
 10/01/19 1102 Verbal Expression Primary Mode of Expression Verbal  
Repetition Impaired Naming Impaired; 5/8 confrontational naming 10/01/19 1103 Oral Assessment Labial No impairment Dentition Upper & lower dentures Lingual Decreased rate PO Trials Assessment Method(s) Observation Vocal Quality No impairment Consistency Presented Solid; Thin liquid How Presented Straw;Successive swallows;Cup/sip Bolus Acceptance No impairment Bolus Formation/Control No impairment Propulsion No impairment Oral Residue None Initiation of Swallow No impairment Laryngeal Elevation Functional  
Aspiration Signs/Symptoms None Pharyngeal Phase Characteristics No impairment, issues, or problems Patient participated with bedside swallowing assessment and cognitive assessment. Speech intelligibility and swallowing are grossly within functional limits but with severely impaired cognitive-linguistic deficits noted on Cognistat. Patient with baseline cough intermittently during the session. No overt signs/sx of aspiration with 4oz of thin liquids via the straw. Upper and lower dentures in place.  
Mastication time within normal limit with adequate oral clearance of the bolus. Patent is [place] with bilateral hearing aids in place. Subtests of the Cognistat as follows: Orientation 3/12 with decreased accuracy even in multiple choice format, attention 4/8, comprehension for following directions with a field of 3 objects 1/6 despite multiple repetitions, naming 5/8, immediate memory 1/4 second attempt 2/4 and short-term recall 0/4, basic calculations 1/4, verbal reasoning 2/8. Patient presents with impaired reasoning, attention, expressive language, auditory processing for following directions, and immediate and short-term memory and would benefit from continued speech therapy to address basic cognitive functions to maximize safety and independence at discharge. Continue regular texture cardiac diet.  
 
Ling Zuñiga MS, CCC-SLP

## 2019-10-01 NOTE — PROGRESS NOTES
PHYSICAL THERAPY DAILY NOTE Time In: 1115 Time Out: 5564 Patient Seen For: AM;Balance activities;Gait training; Therapeutic exercise;Transfer training Subjective: Pt. Agreeable to PT this AM.  Tired of sitting in w/c. Objective: 
Seizure, Other (comment)(Fall precautions, ) COGNITION Daily Assessment Pt. Alert & participates w/ OT. Delayed processing noted w/ poor insight & poor short term recall. Attention is poor. [place].    
 
 
BED/MAT MOBILITY Daily Assessment Rolling Right : 0 (Not tested) Rolling Left : 0 (Not tested) Supine to Sit : 0 (Not tested) Sit to Supine : 0 (Not tested) TRANSFERS Daily Assessment Transfer Type: SPT without device Transfer Assistance : 3 (Moderate assistance ) Sit to Stand Assistance: Moderate assistance Car Transfers: Not tested GAIT Daily Assessment Festinating gait w/ heavy R sided lean & LOB when distracted. Performed trial of gait training w/ shopping cart. PT. Amb. x40' w/ min-mod A x2 w/ difficulty w/ forward advancement due to high distractability Amount of Assistance: 1 (Dependent/total assistance) Distance (ft): 120 Feet (ft)(x1, 70'x1) Assistive Device: Gait belt; Other (comment)(B HHA) with PT facilitating weight shift & assist x2 to provide HHa for balance STEPS or STAIRS Daily Assessment Steps/Stairs Ambulated (#): 0  
 
 
BALANCE Daily Assessment Pt. Performed standing reach & retrieval task w/o UE support. Pt. Required min-mod A for static standing due to increased A/P postural sway & absence of postural reflexes. Pt. Reaches only within ROS[place], requiring mod-max A to correct LOB. Sitting - Static: Fair (occasional) Sitting - Dynamic: Fair (occasional) Standing - Static: Poor Standing - Dynamic : Impaired WHEELCHAIR MOBILITY Daily Assessment Able to Propel (ft): 0 feet Curbs/Ramps Assist Required (FIM Score): 0 (Not tested) Wheelchair Setup Assist Required : 0 (Not tested) LOWER EXTREMITY EXERCISES Daily Assessment Pt. Performed NuStep @ resistance level 2 x12 minutes to increase strength & endurance B UEs/LEs Vital Signs: /82   Pulse 66   Temp 98.4 °F (36.9 °C)   Resp 18   SpO2 91% Pain level: no c/o pain Patient education: pt. Educated on purpose of NuStep - poor carry over & recall noted Interdisciplinary Communication: collaborated w/ OT & SLP regarding pt's cognitive deficits Pt. Left up in recliner in NAD, call bell in reach, [place] activated. Assessment: Pt. Able to increase gait distance this AM w/ less retropulsion noted. However, pt. Cont. To have poor-absent balance reflexes & poor insight/safety awareness so remains at extremely high risk for falls. Pt. Cont. To benefit from PT services to address. Plan of Care: Continue with POC and progress as tolerated. Felecia Martin, PT 
10/1/2019

## 2019-10-02 NOTE — PROGRESS NOTES
PHYSICAL THERAPY DAILY NOTE Time In: 0830 Time Out: 0544 Patient Seen For: AM;Gait training; Therapeutic exercise;Transfer training Subjective: \"When do I get to go back to my room? \"  Pt's son reports pt. Did not sleep well last night. Objective: 
Seizure, Other (comment)(Fall precautions, ) COGNITION Daily Assessment Pt. W/ slowed processing & difficulty following simple commands consistently. Pt. W/ poor insight & requires encouragement to participate w/ PT this AM. BED/MAT MOBILITY Daily Assessment Rolling Right : 5 (Supervision) Rolling Left : 5 (Supervision) Supine to Sit : 5 (Supervision) Sit to Supine : 5 (Supervision) TRANSFERS Daily Assessment Mod A for balance due to poor balance reactions w/ LOB Transfer Type: SPT without device Transfer Assistance : 3 (Moderate assistance )(for balance) Sit to Stand Assistance: Moderate assistance Car Transfers: Not tested GAIT Daily Assessment Attempted gait training w/ single HHA. Pt. W/ festination & distractibility & unable to progress w/ forward motion. However, w/ B HHA, pt. Able to weight shift & progress motion forward. Amount of Assistance: 1 (Dependent/total assistance) Distance (ft): 150 Feet (ft) Assistive Device: Gait belt; Other (comment)(B HHA (assist x2)) STEPS or STAIRS Daily Assessment Steps/Stairs Ambulated (#): 0 Level of Assist : 0 (Not tested) BALANCE Daily Assessment Performed static standing w/ single HHA. Pt. W/ increased sway & sudden LOB in mutliple directions from which he was unable to self-recover Sitting - Static: Good (unsupported) Sitting - Dynamic: Fair (occasional) Standing - Static: Poor Standing - Dynamic : Impaired WHEELCHAIR MOBILITY Daily Assessment Able to Propel (ft): 0 feet Curbs/Ramps Assist Required (FIM Score): 0 (Not tested) Wheelchair Setup Assist Required : 0 (Not tested) LOWER EXTREMITY EXERCISES Daily Assessment Pt. Required max verbal/visual cueing & modeling to performed Extremity: Both Exercise Type #1: Supine lower extremity strengthening Sets Performed: 1 Reps Performed: 15 Level of Assist: Supervision SUPINE EXERCISES Sets Reps Comments Ankle Pumps 1 15   
bridging 1 15 Heel Slides 1 15 Sidelying Hip Abduction 1 15 Short Arc Quad 1 15 Straight Leg Raise 1 15 Vital Signs: /75   Pulse 84   Temp 98 °F (36.7 °C)   Resp 12   SpO2 93% Pain level: no c/o pain Patient education: attempted instruction in LE exercises, pt. Poor carry-over w/ instructions both within a session or in between sessions Interdisciplinary Communication:  Collaborated w/ OT regarding pt's progress Pt. Left supine in NAD, call bell in reach, [place] activated, son @ bedside Assessment: Pt. Requiring more assistance for mobility today w/ increased motor planning deficits & ongoing balance deficits. Pt. Unable to ambulate w/ HHAx1 & requires HHA to facilitate weight shift. Pt. Cont. To mobilize below his baseline & benefits from cont. PT services to address. Plan of Care: Continue with POC and progress as tolerated. Trinidad Banegas, PT 
10/2/2019

## 2019-10-02 NOTE — PROGRESS NOTES
Time In 0700 Time Out 7154 Occupational Therapy Mobility Score Comments Rolling 3: Partial/Moderate A Side: Left Supine to Sit 3: Partial/Moderate A Sit to Supine NA due to patient did not get back into bed Transfer Assist 3: Partial/Moderate A Transfer Type: SPT Equipment: WC  
Comments: maximum verbal and tactile cueing Activities of Daily Living Score Comments Eating 6: Independent Oral Hygiene 4: Supervision or touching A Seated in WC at sink, limited thoroughness requiring supervision for cueing Bathing 4: Supervision or touching A Type of Shower: Shower Position: Unsupported Sitting Adaptive  Equipment: Tub Transfer Bench, Grab Bars and W/C Comments: perseveration on L side, maximum cueing to wash R side Upper Body Dressing 4: Supervision or touching A Items Applied: Pullover Position: Supported Sitting Comments: assist for shirt down back Lower Body Dressing 2: Substantial/Maximal A Items Applied: Underwear and Fasten Pants Position: Supported sitting and Standing PRN Adaptive Equipment: grab bars Comments:   
Donning/[place] Footwear 5: S/U or clean-up assist Items Applied: Socks and Slip-on Shoes Adaptive Equipment:  
Comments: Toilet Transfer 3: Partial/Moderate A Transfer Type: SPT Equipment: grab bars, WC Comments: Toileting Hygiene Not Tested: Not attempted due to environmental concerns: patient did not void Output:  
Comments: incontinent trickles only Education  Transfer training, therapy schedule, benefits of rehab Pt presented supine sleeping in bed. Patient completed ADL; see above for details. Continues to require moderate assist for balance in standing. Continues to require maximum cueing to pull underpants down prior to sitting down on toilet.   
 
Patient tolerated session well with no complaint of pain and was left seated up in recliner with call light/all needs in reach and in no distress. Chair alarm activated. Continue OT POC with focus on ADL/IADL skills, cognition, functional transfers, functional mobility, coordination, strength, static and dynamic balance, and activity tolerance to maximize safety and independence with ADLs and functional transfers. Sharon Holliday MS, OTR/L 
10/2/2019

## 2019-10-02 NOTE — PROGRESS NOTES
OT Daily Note Time In 1028 Time Out 1119 Subjective: No complaints. Pain: none reported Education: educated son on how to replace batteries in hearing aids; educated patient and son on transfer training Interdisciplinary Communication: handoff to SLP Precautions: Other (comment), Seizure(fall risk, impaired cognition ) Location on arrival: handoff from rehab tech Cognition Daily Assessment Patient completed cognitive and bilateral coordination task removing and replacing lids on approximately 20 containers. Required maximum cueing to identify and solve errors throughout task. No recognition noted of errors throughout task. Improved ability to follow commands compared to yesterday, but persists with impaired command following, impaired recall, and impaired problem solving. Coordination Daily Assessment Patient completed fine motor coordination task using medium sized pegs to match pegs to colored board with 100% accuracy. Removed pegs using tip to palm translation with maximum verbal cueing and using BUE. Would benefit from further fine motor coordination tasks. Activity Tolerance Daily Assessment Patient completed UBE x 5 minutes x moderate resistance, going in 1 direction(s) with 0 rest break(s), working on BUE strengthening and functional activity tolerance. Limited sustained attention to task, requiring constant 1:1 supervision to complete task. Would benefit from further activity tolerance tasks. Transfers Patient transferred [facility] to [facility] using SPT with mod A. Educated patient on hand placement with limited carryover. Will require further transfer training. Patient was left seated up in recliner with call light/all needs in reach and in no distress. Replaced hearing aid batteries, showed patient's son how to change them, and educated patient's son in opening the hearing aids at night so batteries don't drain overnight.  Verbalized understanding of all education. Chair alarm activated and SLP present. Assessment: Progressing but impaired cognition remains a primary barrier. Plan: Continue OT POC with focus on ADL/IADL skills, functional transfers, functional mobility, cognition coordination, strength, static and dynamic balance, and activity tolerance to maximize safety and independence with ADLs and functional transfers. Ramonita Beckwith MS, OTR/L 
10/2/2019 Note may contain the following abbreviations:  
Abbreviation Explanation AROM Active range of motion PROM Passive range of motion SPT Stand pivot transfer LPT Lateral pivot transfer WC wheelchair RW Rolling walker BUE Bilateral upper extremities BLE Bilateral lower extremities WBAT Weight bearing as tolerated TTWB Toe-touch weight bearing AD Adaptive device AE Adaptive equipment NMES Neuro muscular electrical stimulation UBE Upper body ergometer

## 2019-10-02 NOTE — PROGRESS NOTES
[facility] PROGRESS NOTE Joshua Zavala Admit Date: 9/30/2019 Admit Diagnosis: Seizure disorder ([facility] [B62.946]; Weakness [R53.1]; Encephalopathy [G93.40];CAD (coronary artery disease) [I25.10]; History of CVA (cerebrovascular accident) [Z86.73]; Impaired functional mobility, balance, gait, and endurance [Z74.09] Subjective  
 
Vss. Afebrile. PT/OT tolerated fairly. Per PT, reports, patient leans forward and to the right, impulsive with all mobility. Hearing aid batteries replaced today. Objective:  
 
Current Facility-Administered Medications Medication Dose Route Frequency  ciprofloxacin HCl (CIPRO) tablet 250 mg  250 mg Oral Q12H  
 acetaminophen (TYLENOL) tablet 650 mg  650 mg Oral Q4H PRN  
 aspirin chewable tablet 81 mg  81 mg Oral DAILY  bisacodyl (DULCOLAX) tablet 5 mg  5 mg Oral DAILY PRN  
 sodium chloride (NS) flush 5-40 mL  5-40 mL IntraVENous Q8H  
 sodium chloride (NS) flush 5-40 mL  5-40 mL IntraVENous PRN  
 heparin (porcine) injection 5,000 Units  5,000 Units SubCUTAneous Q8H  
 donepezil (ARICEPT) tablet 5 mg  5 mg Oral DAILY  levETIRAcetam (KEPPRA) tablet 1,000 mg  1,000 mg Oral BID  lisinopril (PRINIVIL, ZESTRIL) tablet 20 mg  20 mg Oral BID  magnesium oxide (MAG-OX) tablet 400 mg  400 mg Oral TID  NIFEdipine ER (PROCARDIA XL) tablet 60 mg  60 mg Oral BID  PARoxetine CR (PAXIL-CR) tablet 25 mg (Patient Supplied)  25 mg Oral DAILY  phenytoin ER (DILANTIN ER) ER capsule 200 mg  200 mg Oral BID  traZODone (DESYREL) tablet 25 mg  25 mg Oral QHS PRN  
 guaiFENesin (ROBITUSSIN) 100 mg/5 mL oral liquid 100 mg  100 mg Oral Q6H PRN  
 atorvastatin (LIPITOR) tablet 40 mg  40 mg Oral QHS Review of Systems:Denies chest pain, shortness of breath, cough, headache, visual problems, abdominal pain, dysurea, calf pain. Pertinent positives are as noted in the medical records and unremarkable otherwise. Visit Vitals /75 Pulse 84 Temp 98 °F (36.7 °C) Resp 12 SpO2 93% Physical Exam:  
     
General:   Alert, appears stated age, No acute distress. Has hearing aids. HEENT:  Normocephalic, EOMI, facial symmetry  Intact. Oral mucosa pink and moist. No JVD. Lungs:  CTA Bilaterally,Respiration even and unlabored No apparent use of accessory muscles for respiration. Heart:  Regular rate and rhythm, S1, S2, No obvious murmur, click, rub or gallop. Genitourinary: defered Abdomen:  Soft, non-tender to palpation in all four quadrants. Bowel sounds present. No obvious masses palpated. Neuromuscular:  PERRL, EOMI 
LUE     Shoulder abduction  4+ /5 Elbow flexion:   5-/5 Wrist extension:   5-/5 Finger flexion;   5-/5 RUE    Shoulder abduction:5-  /5 Elbow flexion: 5  /5 Wrist extension: 5/5 Finger flexion: 5  /5 LLE     Hip flexion:  45 
            Knee extension:  4+ /5 Ankle dorsiflexion:  4+ /5 Ankle plantarflexion:  5-/5 RLE     Hip flexion:  5- /5 Knee extension:  4+ /5 Ankle dorsiflexion: 5-  /5 EHL:   /5 Ankle plantarflexion:   5-/5 Sensory - intact Plantars - down going 
   
Skin:  Intact, dry, good skin turgor, age related changes present Edema: none Functional Assessment: 
 
Balance Sitting - Static: Good (unsupported) (10/02/19 1000) Sitting - Dynamic: Fair (occasional) (10/02/19 1000) Standing - Static: Poor (10/02/19 1000) Standing - Dynamic : Impaired (10/02/19 1000) Tressia Bullion Fall Risk Assessment: 
[place] Risk Mobility: Ambulates or transfers with assist devices or assistance (10/01/19 2310) Mobility Interventions: Utilize walker, cane, or other assistive device (10/01/19 2310) Mentation: Periodic confusion (10/01/19 2310) Mentation Interventions: Bed/chair exit alarm; Door open when patient unattended;Family/sitter at bedside (10/01/19 2310) Medication: Patient receiving anticonvulsants, sedatives(tranquilizers), psychotropics or hypnotics, hypoglycemics, narcotics, sleep aids, antihypertensives, laxatives, or diuretics (10/01/19 2310) Medication Interventions: Patient to call before getting OOB (10/01/19 2310) Elimination: Incontinence (10/01/19 2310) Elimination Interventions: Bed/chair exit alarm;Call light in reach (10/01/19 2310) Prior Fall History: Before admission in past 12 months _home or previous inpatient care) (10/01/19 2310) History of Falls Interventions: Bed/chair exit alarm; Door open when patient unattended (10/01/19 2310) Total Score: 5 (10/01/19 2310) Standard Fall Precautions: Yes (09/30/19 2039) High Fall Risk: Yes (10/01/19 2310) Speech Assessment: 
Aspiration Signs/Symptoms: None (10/01/19 1103) Ambulation: 
Gait Distance (ft): 150 Feet (ft) (10/02/19 1000) Assistive Device: Gait belt; Other (comment)(B HHA (assist x2)) (10/02/19 1000) Labs/Studies: 
Recent Results (from the past 72 hour(s)) CBC W/O DIFF Collection Time: 09/30/19  5:16 AM  
Result Value Ref Range WBC 5.2 4.3 - 11.1 K/uL  
 RBC 4.70 4.23 - 5.6 M/uL  
 HGB 12.4 (L) 13.6 - 17.2 g/dL HCT 39.4 (L) 41.1 - 50.3 % MCV 83.8 79.6 - 97.8 FL  
 MCH 26.4 26.1 - 32.9 PG  
 MCHC 31.5 31.4 - 35.0 g/dL  
 RDW 14.6 11.9 - 14.6 % PLATELET 217 946 - 504 K/uL MPV 9.5 9.4 - 12.3 FL ABSOLUTE NRBC 0.00 0.0 - 0.2 K/uL METABOLIC PANEL, BASIC Collection Time: 09/30/19  5:16 AM  
Result Value Ref Range Sodium 140 136 - 145 mmol/L Potassium 4.0 3.5 - 5.1 mmol/L Chloride 107 98 - 107 mmol/L  
 CO2 32 21 - 32 mmol/L Anion gap 1 (L) 7 - 16 mmol/L Glucose 95 65 - 100 mg/dL BUN 19 8 - 23 MG/DL Creatinine 0.92 0.8 - 1.5 MG/DL  
 GFR est AA >60 >60 ml/min/1.73m2 GFR est non-AA >60 >60 ml/min/1.73m2 Calcium 9.3 8.3 - 10.4 MG/DL  
CBC W/O DIFF Collection Time: 10/01/19  6:24 AM  
Result Value Ref Range WBC 6.2 4.3 - 11.1 K/uL  
 RBC 4.39 4.23 - 5.6 M/uL  
 HGB 11.8 (L) 13.6 - 17.2 g/dL HCT 37.7 (L) 41.1 - 50.3 % MCV 85.9 79.6 - 97.8 FL  
 MCH 26.9 26.1 - 32.9 PG  
 MCHC 31.3 (L) 31.4 - 35.0 g/dL  
 RDW 14.6 11.9 - 14.6 % PLATELET 973 235 - 679 K/uL MPV 9.5 9.4 - 12.3 FL ABSOLUTE NRBC 0.00 0.0 - 0.2 K/uL MAGNESIUM Collection Time: 10/01/19  6:24 AM  
Result Value Ref Range Magnesium 2.4 1.8 - 2.4 mg/dL METABOLIC PANEL, BASIC Collection Time: 10/01/19  6:24 AM  
Result Value Ref Range Sodium 146 (H) 136 - 145 mmol/L Potassium 3.8 3.5 - 5.1 mmol/L Chloride 108 (H) 98 - 107 mmol/L  
 CO2 30 21 - 32 mmol/L Anion gap 8 7 - 16 mmol/L Glucose 92 65 - 100 mg/dL BUN 20 8 - 23 MG/DL Creatinine 0.99 0.8 - 1.5 MG/DL  
 GFR est AA >60 >60 ml/min/1.73m2 GFR est non-AA >60 >60 ml/min/1.73m2 Calcium 9.0 8.3 - 10.4 MG/DL Assessment:  
 
Problem List as of 10/2/2019 Date Reviewed: 10/1/2019 Codes Class Noted - Resolved Weakness ICD-10-CM: R53.1 ICD-9-CM: 780.79  10/1/2019 - Present Encephalopathy ICD-10-CM: G93.40 ICD-9-CM: 348.30  10/1/2019 - Present [facility] ICD-10-CM: G40.909 ICD-9-CM: 345.90  10/1/2019 - Present Impaired functional mobility, balance, gait, and endurance ICD-10-CM: Z74.09 
ICD-9-CM: V49.89  10/1/2019 - Present Status epilepticus ([facility] ICD-10-CM: Aldona Ruiz ICD-9-CM: 345.3  9/28/2019 - Present  
   
 TIA (transient ischemic attack) ICD-10-CM: G45.9 ICD-9-CM: 435.9  9/26/2019 - Present Seizures ([facility] ICD-10-CM: R56.9 ICD-9-CM: 780.39  9/26/2019 - Present History of CVA (cerebrovascular accident) ICD-10-CM: Z86.73 
ICD-9-CM: V12.54  9/26/2019 - Present HTN (hypertension) ICD-10-CM: I10 
ICD-9-CM: 401.9  9/26/2019 - Present HLD (hyperlipidemia) ICD-10-CM: E78.5 ICD-9-CM: 272.4  9/26/2019 - Present CAD (coronary artery disease) ICD-10-CM: I25.10 ICD-9-CM: 414.00  9/26/2019 - Present History of prostatectomy ICD-10-CM: Z90.79 ICD-9-CM: V45.89  9/26/2019 - Present History of prostate cancer ICD-10-CM: Z85.46 
ICD-9-CM: V10.46  9/26/2019 - Present Plan:  
 
Rehabilitation Plan The patient has shown the ability to tolerate and benefit from 3 hours of therapy daily and is being admitted to a comprehensive acute inpatient rehabilitation program consisting of at least 3 hours of combined physical and occupational therapies. Continue intensive Physical Therapy for a minimum of 1.5 hours a day, at least 5 out of 7 days per week to address bed mobility, transfers, ambulation, strengthening, balance, and endurance. Begin intensive Occupational Therapy for a minimum of 1.5 hours a day, at least 5 out of 7 days per week to address ADL ( bathing, LE dressing, toileting) and adaptive equipment as needed. 
  
ST - cognitive assessment, post ictal confusion. deficits include impaired reasoning, attention, expressive language, auditory processing for following directions, and immediate and short-term memory per ST. 
  
The patient will also require 24-hour skilled rehabilitation nursing for bowel and bladder management, skin care for decubitus ulcer prevention , pain management and ongoing medication administration  
  
  
Continue daily physician medical management: 
Seizures/ TIA/ encephalopathy 
- keppra 
- added Phenytoin ER. Will measure level in 1 week. - 10/1 monitor. Seizure precautions. - 10/2 - continue Keppra, dilantin.  
  
History of CVA (cerebrovascular accident) (9/26/2019) 
- left LE weakness 
- lipitor 
- aspirin 81 
- aricept - prior R frontal hemorrahagic CVA.  
  
  
UTI - + pseudomonas, sensitive to cipro, start po cipro x 5 days. Hypertension - BP uncontrolled, fluctuating, managed medically. - continue lisinopril, procardia XL 
- 10/2 - -180s. add norvasc 5. HLD (hyperlipidemia) (9/26/2019) 
- lipitor 
  
CAD (coronary artery disease) - cardiac precaution.  
  
Pneumonia prophylaxis- Insentive spirometer every hour while awake 
  
DVT risk / DVT Prophylaxis-   
- SCDs. History of prostatectomy/ History of prostate cancer (9/26/2019)/ Urinary retention/ neurogenic bladder --  
scheulde voids q2h. As pt incontinent. - Check post-void residual every shift; In and Out catheterize if post-void residual is more than 400 cc. 
  
 
bowel program - incontinent 
  
GERD - resume PPI. At times may need additional antacids, Maalox prn. 
  
  
 
Time spent was 25 minutes with over 1/2 in direct patient care/examination, consultation and coordination of care. Signed By: Armand Dolan MD   
 October 2, 2019

## 2019-10-02 NOTE — PROGRESS NOTES
PHYSICAL THERAPY DAILY NOTE Time In: 1300 Time Out: 9411 Patient Seen For: PM;Other (see progress notes);Gait training; Therapeutic exercise;Transfer training Subjective: Patient had no complaints. Objective: No pain noted. Seizure, Other (comment)(Fall precautions, ) GROSS ASSESSMENT Daily Assessment COGNITION Daily Assessment BED/MAT MOBILITY Daily Assessment Rolling Right : 5 (Supervision) Rolling Left : 5 (Supervision) Supine to Sit : 5 (Supervision) Sit to Supine : 5 (Supervision) TRANSFERS Daily Assessment Transfer Type: SPT without device Transfer Assistance : 3 (Moderate assistance ) Sit to Stand Assistance: Moderate assistance Car Transfers: Not tested GAIT Daily Assessment Amount of Assistance: 3 (Moderate assistance) Distance (ft): 150 Feet (ft) Assistive Device: Gait belt; Other (comment)(HHA x 1) STEPS or STAIRS Daily Assessment Steps/Stairs Ambulated (#): 0 Level of Assist : 0 (Not tested) BALANCE Daily Assessment Sitting - Static: Good (unsupported) Sitting - Dynamic: Fair (occasional) Standing - Static: Poor Standing - Dynamic : Impaired WHEELCHAIR MOBILITY Daily Assessment Able to Propel (ft): 0 feet Curbs/Ramps Assist Required (FIM Score): 0 (Not tested) Wheelchair Setup Assist Required : 0 (Not tested) LOWER EXTREMITY EXERCISES Daily Assessment Extremity: Both Exercise Type #1: Other (comment)(standing exs in II bars) Sets Performed: 2 Reps Performed: 10 Level of Assist: Moderate assistance Exercise Type #2: Other (comment)(nustep x 10 minutes) Sets Performed: 1 Reps Performed: 10 Level of Assist: Stand-by assistance Assessment: Patient making good progress. Plan of Care: Continue with plan of care. [facility], [facility] 
10/2/2019

## 2019-10-02 NOTE — PROGRESS NOTES
10/02/19 1241 Time Spent With Patient Time In 1121 Time Out 1207 Patient Seen For: AM  
Mental Status Neurologic State Alert Orientation Level Disoriented X4 Cognition Decreased attention/concentration;Decreased command following;Memory loss; Impaired decision making Perseveration Perseverates during conversation Safety/Judgement Decreased insight into deficits;Home safety; Fall prevention 10/02/19 1242 Verbal Expression Naming Impaired; several repetitions and maximum verbal cues required to name familiar events/interests 10/02/19 1243 Verbal Organization Exercises Divergent Naming Accuracy (%) 20 % to name items needed to complete a common activity; limited assistance by given sentence completion format or semantic/phonemic cues Neuro-Linguistic Exercises Verbal Problem Solving Impaired Verbal Organization Impaired 10/02/19 1245 Auditory Comprehension Auditory Impairment Yes One-Step Basic Commands (%) <50 % (basic writing task given oral directions to follow with reps 2-3x) Patient participated with basic cognitive-linguistic therapy. Patient's son was present and reported that he had short-term memory deficits from CVA in 2018 and went to OP speech therapy for several months; however, reported that pt is not near his cognitive baseline since last week with the patient typically able to follow directions, participate in conversation, and recall family and friends. Basic comprehension task to follow one step oral directions during a writing task completed with less than 50% accuracy given repetitions of instructions 2-3 times. Decreased initiation to complete. Divergent naming to state items needed to complete a familiar task completed with between 0-1 of 4 items named independently and required maximum cues to name additional items with both reasoning and word retrieval deficits noted. Poor insight into cognitive deficits with close supervision recommended for safety. Will continue to follow for receptive/expressive language, orientation, attention, and basic reasoning.  
 
Zina Hunt MS, CCC-SLP

## 2019-10-02 NOTE — PROGRESS NOTES
Nutrition Reason for assessment: BPA - Malnutrition Screening Tool positive for unintended weight loss. Recently Lost Weight Without Trying: Yes If Yes, How Much Weight Loss: >33 lbs Eating Poorly Due to Decreased Appetite: Yes Assessment:  
Food/Nutrition Patient History:  Pt admitted with AMS: CVA vs seizure activity, hypokalemia, HTN. PMH remarkable for  Prostate ca, duodenal ulcer, CAD, HTN, CVA, seizures. Pt transferred from [facility] to rehab unit. Pt was unavailable at RD visit but Pt's son was present and able to answer questions. Son states that at dinner last night pt ate none of the meal but was not very with it. However this morning ate the entire breakfast and was much more aware this morning. Son also states that pt is used to taking ensures at home and would be helpful to have as an option.   
  
DIET CARDIAC Regular, 2gm NA 
  
Anthropometrics:Height: 5' 9\" (175.3 cm),  Weight: 83.008 kg (183 lb), Weight Source: Standing scale (comment), Body mass index is 26.4 kg/m². BMI class of overweight. UBW per wife 220# prior to CVA in May 2018. Pt presents with 17% weight loss over 16 months. Weight loss was reported to be continual for ~ one year and more recently stable between 180-185#. Pt presents with 17% weight loss over 16 months. Macronutrient needs: 83 kg standing weight 9/26/19 EER:  4425-7919 kcal /day (20-25 kcal/kg) EPR:   grams protein/day (1-1.2 grams/kg) 
  Intake/Comparative Standards: Recorded meal(s): 2 meals consuming 60%. Pt son reports ate 0% of dinner and 100% of breakfast which he showed the RD an empty plate at rounds. This shows patient eating about 60% of meals which meets 60% of calories and 605 of protein needs. Patient Vitals for the past 100 hrs: 
 % Diet Eaten 10/01/19 1403 50 % 10/01/19 0830 75 %  
 
  
Nutrition Diagnosis: Predicted inadequate oral intake related to altered mentation, hx of compromised intake as evidenced by historical wt loss of 17% with recent stabilization, limited awareness of hunger cues reported at baseline.   
  
Intervention: 
Meals and snacks: Continue current diet. Nutrition supplement therapy: Add Ensure Enlive TID - clarify strawberry flavor. Discharge Nutrition Plan: Continue Oral Nutrition Supplement (ONS) at discharge.  Recommend Ensure Enlive or a comparable/similar product Twice daily for 30 days unless otherwise directed by your Primary Care Physician.  
  
Jose C Ku, MS, RD, LD

## 2019-10-03 NOTE — PROGRESS NOTES
10/03/19 1318 Time Spent With Patient Time In 1003 Time Out 1045 Patient Seen For: AM;Other (see progress notes); ADLs Grooming Grooming Assistance  SBA Comments required setup assist to brush teeth; able to brush teeth without help S: No complaints. Very easily distracted, unable to consistently follow simple 1 step commands during session. O: Patient was handed off from PT. Agreeable to treatment. Patient attempted BUE  strengthening using medium density theraputty and removed 4 pegs. Required maximum cueing to persist with task. Patient participated in bilateral boaz exercises with 8 pounds total weight 10 x 1 sets x shoulder flexion/extension, shoulder horizontal abduction/adduction, V-pattern, and large circles, working on BUE strengthening and increasing activity tolerance. Required maximum cueing to persist with task. Patient completed tooth brushing; see above for details. Transferred WC to recliner using SPT with mod A. A: Impaired cognition remains patient's primary barrier. Limited participation due to marked difficulty following 1 step commands. Patient tolerated session well with no complaint of pain. P: Continue OT POC with focus on ADL/IADL skills, functional transfers, cognition, functional mobility, coordination, strength, static and dynamic balance, and activity tolerance to maximize safety and independence with ADLs and functional transfers. Patient was left seated up in recliner with call bell/all other needs in reach. Chair alarm activated. Interdisciplinary communication: Collaborated with PT and confirmed that patient is on track to meet goals. Dorina Moe MS, OTR/L 
10/3/2019 Note may contain the following abbreviations:  
Abbreviation Explanation AROM Active range of motion PROM Passive range of motion SPT Stand pivot transfer LPT Lateral pivot transfer WC wheelchair RW Rolling walker BUE Bilateral upper extremities BLE Bilateral lower extremities WBAT Weight bearing as tolerated TTWB Toe-touch weight bearing AD Adaptive device AE Adaptive equipment NMES Neuro muscular electrical stimulation UBE Upper body ergometer

## 2019-10-03 NOTE — PROGRESS NOTES
10/03/19 1243 Time Spent With Patient Time In 1125 Time Out 1200 Patient Seen For: AM  
Mental Status Neurologic State Alert Cognition Decreased attention/concentration;Decreased command following; Impaired decision making;Poor safety awareness Perception Appears intact Perseveration Perseverates during conversation Safety/Judgement Decreased insight into deficits; Fall prevention;Decreased awareness of need for safety 10/03/19 1244 Neuro-Linguistic Exercises Memory Impaired Attention  Impaired 10/03/19 1245 Auditory Comprehension Auditory Impairment Yes Response to Basic Yes/No Questions (%) 50 % 10/03/19 1246 Verbal Expression Responsive (%) 30 % Patient participated with basic cognitive therapy. Answering simple yes/no questions with 50% accuracy given repetitions and additional time. Impaired sustained attention with patient reading items from the board behind ST or looking out the window with frequent re-direction to task required. Responsive naming task at the basic level required maximum cues. Limited assistance by verbal cues including sentence completion format, semantic, or phonemic cues. Simple orientation task to choose information from a field of 2 with visual and verbal presentation completed 3/5 correctly. Patient used the written information to answer the same questions later in the session with Yulia given use of the visual aid. Patient presents with impaired receptive/expressive deficits, impaired attention, decreased immediate and working memory, and decreased initiation with continued ST recommended.  
 
Diamante Carcamo MS, CCC-SLP

## 2019-10-03 NOTE — PROGRESS NOTES
Problem: Falls - Risk of 
Goal: *Absence of Falls Description Document Canelo Schroeder Fall Risk and appropriate interventions in the flowsheet. 10/3/2019 1824 by Rick Bunch RN Outcome: Progressing Towards Goal 
Note:  
Fall Risk Interventions: 
Mobility Interventions: Utilize walker, cane, or other assistive device Mentation Interventions: Bed/chair exit alarm, Door open when patient unattended, Family/sitter at bedside Medication Interventions: Patient to call before getting OOB Elimination Interventions: Bed/chair exit alarm History of Falls Interventions: Bed/chair exit alarm, Door open when patient unattended, Utilize gait belt for transfer/ambulation 10/3/2019 1540 by Rick Bunch RN Outcome: Progressing Towards Goal 
Note:  
Fall Risk Interventions: 
Mobility Interventions: Utilize walker, cane, or other assistive device Mentation Interventions: Bed/chair exit alarm, Door open when patient unattended, Family/sitter at bedside Medication Interventions: Patient to call before getting OOB Elimination Interventions: Bed/chair exit alarm History of Falls Interventions: Bed/chair exit alarm, Door open when patient unattended, Utilize gait belt for transfer/ambulation Problem: Patient Education: Go to Patient Education Activity Goal: Patient/Family Education Outcome: Progressing Towards Goal 
  
Problem: Inpatient Rehab (Adult) Goal: *LTG: Avoids injury/falls 100% of time related to deficits Outcome: Progressing Towards Goal 
Goal: *LTG: Avoids infection 100% of time related to deficits Outcome: Progressing Towards Goal 
Goal: *LTG: Verbalize understanding of diagnosis and risk factors for recurring stroke Outcome: Progressing Towards Goal 
Goal: *LTG: Absence of DVT during hospitalization Outcome: Progressing Towards Goal 
Goal: *LTG: Maintains Skin Integrity With No Evidence of Pressure Injury 100% of Time Outcome: Progressing Towards Goal 
Goal: Interventions Outcome: Progressing Towards Goal 
  
Problem: Pressure Injury - Risk of 
Goal: *Prevention of pressure injury Description Document Jerry Scale and appropriate interventions in the flowsheet. Outcome: Progressing Towards Goal 
Note:  
Pressure Injury Interventions: 
Sensory Interventions: Assess changes in LOC Moisture Interventions: Absorbent underpads Activity Interventions: Increase time out of bed Mobility Interventions: HOB 30 degrees or less Nutrition Interventions: Document food/fluid/supplement intake Friction and Shear Interventions: Foam dressings/transparent film/skin sealants Problem: Patient Education: Go to Patient Education Activity Goal: Patient/Family Education Outcome: Progressing Towards Goal

## 2019-10-03 NOTE — PROGRESS NOTES
PHYSICAL THERAPY DAILY NOTE Time In: 2691 Time Out: 1003 Patient Seen For: AM;Other (see progress notes);Gait training; Therapeutic exercise;Transfer training Subjective: Patient had no complaints. Objective: No pain noted. Seizure, Other (comment)(Fall precautions, ) GROSS ASSESSMENT Daily Assessment COGNITION Daily Assessment BED/MAT MOBILITY Daily Assessment Supine to Sit : 5 (Supervision) Sit to Supine : 5 (Supervision) TRANSFERS Daily Assessment Transfer Type: SPT without device Transfer Assistance : 3 (Moderate assistance ) Sit to Stand Assistance: Moderate assistance Car Transfers: Not tested GAIT Daily Assessment Shuffling steps and leaning to the left today. Amount of Assistance: 3 (Moderate assistance) Distance (ft): 150 Feet (ft) Assistive Device: Gait belt;Walker, rolling STEPS or STAIRS Daily Assessment Level of Assist : 0 (Not tested) BALANCE Daily Assessment WHEELCHAIR MOBILITY Daily Assessment LOWER EXTREMITY EXERCISES Daily Assessment Extremity: Both Exercise Type #1: Other (comment)(nustep x 10 minutes) Sets Performed: 1 Reps Performed: 10 Level of Assist: Supervision Exercise Type #2: Other (comment)(standing balance exs hitting balloon) Sets Performed: 3 Reps Performed: 0 Level of Assist: Maximum assistance Assessment: Patient is making progress but varies from day to day with progress. Plan of Care: Continue with plan of care. Saravanan Calderon PTA 
10/3/2019

## 2019-10-03 NOTE — PROGRESS NOTES
PHYSICAL THERAPY DAILY NOTE Time In: 1347 Time Out: 7570 Patient Seen For: PM;Gait training; Other (see progress notes); Therapeutic exercise;Transfer training Subjective: Patient had no complaints. Objective: NO pain noted. Seizure, Other (comment)(Fall precautions, ) GROSS ASSESSMENT Daily Assessment COGNITION Daily Assessment BED/MAT MOBILITY Daily Assessment Supine to Sit : 5 (Supervision) Sit to Supine : 5 (Supervision) TRANSFERS Daily Assessment Transfer Type: SPT without device Transfer Assistance : 3 (Moderate assistance ) Sit to Stand Assistance: Moderate assistance Car Transfers: Not tested GAIT Daily Assessment Amount of Assistance: 3 (Moderate assistance) Distance (ft): 150 Feet (ft) Assistive Device: Walker, rolling;Gait belt STEPS or STAIRS Daily Assessment Level of Assist : 0 (Not tested) BALANCE Daily Assessment WHEELCHAIR MOBILITY Daily Assessment LOWER EXTREMITY EXERCISES Daily Assessment Extremity: Both Exercise Type #1: Other (comment)(nustep x 10 minutes) Sets Performed: 2 Reps Performed: 10 Level of Assist: Supervision Exercise Type #2: Other (comment)(standing balance exs hitting balloon) Sets Performed: 3 Reps Performed: 0 Level of Assist: Maximum assistance Assessment: Patient impulsive. .. Plan of Care: Continue with plan of care. Amanda Davidson, COLE 
10/3/2019

## 2019-10-03 NOTE — PROGRESS NOTES
OT Daily Note Time In 1256 Time Out 1345 Subjective: No complaints. Remains confused, disoriented x 4. Pain: none reported Education: benefits of rehab Interdisciplinary Communication: handoff by rehab tech Precautions: Other (comment), Seizure(fall risk, impaired cognition ) Location on arrival: handoff from rehab tech Activity Tolerance Daily Assessment Patient completed UBE x 10 minutes x moderate resistance, going in 2 direction(s) with 1 rest break(s), working on BUE strengthening and functional activity tolerance. Would benefit from further activity tolerance tasks. Remains limited by very impaired attention, requiring constant 1:1 supervision during all tasks. Visual-Perceptual Daily Assessment Patient completed scanning task using [place] Rate of Manipulation test numbered 1-60, re-sequencing numbers from 1-60. Completed 1-30 with min to mod A for attention and problem solving. Completed 30-40 with maximum to total assist. Task terminated due to time constraints. Surprisingly good ability to participate in task, but impaired attention remains primary barrier. Remains with severely impaired ability to follow 1-2 step commands. Patient was left seated up in gym with PT present. Assessment: Impaired cognition remains primary barrier. Anticipate patient will require 1:1 supervision for all ADL tasks. Plan: Continue OT POC with focus on ADL/IADL skills, functional transfers, functional mobility, cognition, coordination, strength, static and dynamic balance, and activity tolerance to maximize safety and independence with ADLs and functional transfers. Pawel Anderson MS, OTR/L 
10/3/2019 Note may contain the following abbreviations:  
Abbreviation Explanation AROM Active range of motion PROM Passive range of motion SPT Stand pivot transfer LPT Lateral pivot transfer WC wheelchair RW Rolling walker BUE Bilateral upper extremities BLE Bilateral lower extremities WBAT Weight bearing as tolerated TTWB Toe-touch weight bearing AD Adaptive device AE Adaptive equipment NMES Neuro muscular electrical stimulation UBE Upper body ergometer

## 2019-10-03 NOTE — PROGRESS NOTES
SFD PROGRESS NOTE Guillermo [place]os[place] Admit Date: 9/30/2019 Admit Diagnosis: Seizure disorder ([facility] [H86.996]; Weakness [R53.1]; Encephalopathy [G93.40];CAD (coronary artery disease) [I25.10]; History of CVA (cerebrovascular accident) [Z86.73]; Impaired functional mobility, balance, gait, and endurance [Z74.09] Subjective  
 
Vss. Afebrile. No new setbacks. Still with balance impairment, gait instability. Alertness, sensorium reported to be may be somewhat [place] this morning. Objective:  
 
Current Facility-Administered Medications Medication Dose Route Frequency  ciprofloxacin HCl (CIPRO) tablet 250 mg  250 mg Oral Q12H  
 NIFEdipine ER (PROCARDIA XL) tablet 90 mg  90 mg Oral BID  acetaminophen (TYLENOL) tablet 650 mg  650 mg Oral Q4H PRN  
 aspirin chewable tablet 81 mg  81 mg Oral DAILY  bisacodyl (DULCOLAX) tablet 5 mg  5 mg Oral DAILY PRN  
 sodium chloride (NS) flush 5-40 mL  5-40 mL IntraVENous Q8H  
 sodium chloride (NS) flush 5-40 mL  5-40 mL IntraVENous PRN  
 heparin (porcine) injection 5,000 Units  5,000 Units SubCUTAneous Q8H  
 donepezil (ARICEPT) tablet 5 mg  5 mg Oral DAILY  levETIRAcetam (KEPPRA) tablet 1,000 mg  1,000 mg Oral BID  lisinopril (PRINIVIL, ZESTRIL) tablet 20 mg  20 mg Oral BID  magnesium oxide (MAG-OX) tablet 400 mg  400 mg Oral TID  PARoxetine CR (PAXIL-CR) tablet 25 mg (Patient Supplied)  25 mg Oral DAILY  phenytoin ER (DILANTIN ER) ER capsule 200 mg  200 mg Oral BID  traZODone (DESYREL) tablet 25 mg  25 mg Oral QHS PRN  
 guaiFENesin (ROBITUSSIN) 100 mg/5 mL oral liquid 100 mg  100 mg Oral Q6H PRN  
 atorvastatin (LIPITOR) tablet 40 mg  40 mg Oral QHS Review of Systems:Denies chest pain, shortness of breath, cough, headache, visual problems, abdominal pain, dysurea, calf pain. Pertinent positives are as noted in the medical records and unremarkable otherwise. Visit Vitals /71 Pulse (!) 53 Temp 98.1 °F (36.7 °C) Resp 16 SpO2 95% Physical Exam:  
     
General:   Alert. No acute distress. Has hearing aids. HEENT:  Normocephalic, EOMI, facial symmetry  Intact. Oral mucosa pink and moist. No JVD. Lungs:  CTA Bilaterally,Respiration even and unlabored No apparent use of accessory muscles for respiration. Heart:  Regular rate and rhythm, S1, S2, No obvious murmur, click, rub or gallop. Genitourinary: defered Abdomen:  Soft, non-tender to palpation in all four quadrants. Neuromuscular:  PERRL, EOMI 
LUE     Shoulder abduction  4+ /5 Elbow flexion:   5-/5 Wrist extension:   5-/5 Finger flexion;   5-/5 RUE    Shoulder abduction:5-  /5 Elbow flexion: 5  /5 Wrist extension: 5/5 Finger flexion: 5  /5 LLE     Hip flexion:  45 
            Knee extension:  4+ /5 Ankle dorsiflexion:  4+ /5 Ankle plantarflexion:  5-/5 RLE     Hip flexion:  5- /5 Knee extension:  4+ /5 Ankle dorsiflexion: 5-  /5 Ankle plantarflexion:   5-/5 Sensory - intact Plantars - down going 
   
Skin:  Intact, dry, good skin turgor, age related changes present Edema: none Functional Assessment: 
 
Balance Sitting - Static: Good (unsupported) (10/02/19 1000) Sitting - Dynamic: Fair (occasional) (10/02/19 1000) Standing - Static: Poor (10/02/19 1000) Standing - Dynamic : Impaired (10/02/19 1000) [facility] Fall Risk Assessment: 
Juwan Levy Risk Mobility: Ambulates or transfers with assist devices or assistance (10/02/19 1929) Mobility Interventions: Utilize walker, cane, or other assistive device (10/02/19 1929) Mentation: Periodic confusion (10/02/19 1929) Mentation Interventions: Bed/chair exit alarm; Door open when patient unattended;Family/sitter at bedside (10/02/19 1929) Medication: Patient receiving anticonvulsants, sedatives(tranquilizers), psychotropics or hypnotics, hypoglycemics, narcotics, sleep aids, antihypertensives, laxatives, or diuretics (10/02/19 1929) Medication Interventions: Patient to call before getting OOB (10/02/19 1929) Elimination: Incontinence (10/02/19 1929) Elimination Interventions: Bed/chair exit alarm (10/02/19 1929) Prior Fall History: Before admission in past 12 months _home or previous inpatient care) (10/02/19 1929) History of Falls Interventions: Bed/chair exit alarm; Door open when patient unattended;Utilize gait belt for transfer/ambulation (10/02/19 1929) Total Score: 5 (10/02/19 1929) Standard Fall Precautions: Yes (09/30/19 2039) High Fall Risk: Yes (10/02/19 1929) Speech Assessment: 
Aspiration Signs/Symptoms: None (10/01/19 1103) Ambulation: 
Gait Distance (ft): 150 Feet (ft) (10/02/19 1614) Assistive Device: Gait belt; Other (comment)(HHA x 1) (10/02/19 1614) Labs/Studies: 
Recent Results (from the past 72 hour(s)) CBC W/O DIFF Collection Time: 10/01/19  6:24 AM  
Result Value Ref Range WBC 6.2 4.3 - 11.1 K/uL  
 RBC 4.39 4.23 - 5.6 M/uL  
 HGB 11.8 (L) 13.6 - 17.2 g/dL HCT 37.7 (L) 41.1 - 50.3 % MCV 85.9 79.6 - 97.8 FL  
 MCH 26.9 26.1 - 32.9 PG  
 MCHC 31.3 (L) 31.4 - 35.0 g/dL  
 RDW 14.6 11.9 - 14.6 % PLATELET 124 076 - 813 K/uL MPV 9.5 9.4 - 12.3 FL ABSOLUTE NRBC 0.00 0.0 - 0.2 K/uL MAGNESIUM Collection Time: 10/01/19  6:24 AM  
Result Value Ref Range Magnesium 2.4 1.8 - 2.4 mg/dL METABOLIC PANEL, BASIC Collection Time: 10/01/19  6:24 AM  
Result Value Ref Range Sodium 146 (H) 136 - 145 mmol/L Potassium 3.8 3.5 - 5.1 mmol/L Chloride 108 (H) 98 - 107 mmol/L  
 CO2 30 21 - 32 mmol/L Anion gap 8 7 - 16 mmol/L Glucose 92 65 - 100 mg/dL BUN 20 8 - 23 MG/DL  Creatinine 0.99 0.8 - 1.5 MG/DL  
 GFR est AA >60 >60 ml/min/1.73m2 GFR est non-AA >60 >60 ml/min/1.73m2 Calcium 9.0 8.3 - 10.4 MG/DL Assessment:  
 
Problem List as of 10/3/2019 Date Reviewed: 10/1/2019 Codes Class Noted - Resolved Weakness ICD-10-CM: R53.1 ICD-9-CM: 780.79  10/1/2019 - Present Encephalopathy ICD-10-CM: G93.40 ICD-9-CM: 348.30  10/1/2019 - Present Seizure disorder ([facility] ICD-10-CM: G40.909 ICD-9-CM: 345.90  10/1/2019 - Present Impaired functional mobility, balance, gait, and endurance ICD-10-CM: Z74.09 
ICD-9-CM: V49.89  10/1/2019 - Present Status epilepticus ([facility] ICD-10-CM: Ulysses Halt ICD-9-CM: 345.3  9/28/2019 - Present  
   
 TIA (transient ischemic attack) ICD-10-CM: G45.9 ICD-9-CM: 435.9  9/26/2019 - Present Seizures ([facility] ICD-10-CM: R56.9 ICD-9-CM: 780.39  9/26/2019 - Present History of CVA (cerebrovascular accident) ICD-10-CM: Z86.73 
ICD-9-CM: V12.54  9/26/2019 - Present HTN (hypertension) ICD-10-CM: I10 
ICD-9-CM: 401.9  9/26/2019 - Present HLD (hyperlipidemia) ICD-10-CM: E78.5 ICD-9-CM: 272.4  9/26/2019 - Present CAD (coronary artery disease) ICD-10-CM: I25.10 ICD-9-CM: 414.00  9/26/2019 - Present History of prostatectomy ICD-10-CM: Z90.79 ICD-9-CM: V45.89  9/26/2019 - Present History of prostate cancer ICD-10-CM: Z85.46 
ICD-9-CM: V10.46  9/26/2019 - Present Plan:  
 
Rehabilitation Plan The patient has shown the ability to tolerate and benefit from 3 hours of therapy daily and is being admitted to a comprehensive acute inpatient rehabilitation program consisting of at least 3 hours of combined physical and occupational therapies. Continue intensive Physical Therapy for a minimum of 1.5 hours a day, at least 5 out of 7 days per week to address bed mobility, transfers, ambulation, strengthening, balance, and endurance.   
continue intensive Occupational Therapy for a minimum of 1.5 hours a day, at least 5 out of 7 days per week to address ADL ( bathing, LE dressing, toileting) and adaptive equipment as needed. - weakness BLE, imapired balance major barriers. Becomes easily fatigued. 
  
ST - cognitive assessment, post ictal confusion. deficits include impaired reasoning, attention, expressive language, auditory processing for following directions, and immediate and short-term memory per ST. 
  
The patient will also require 24-hour skilled rehabilitation nursing for bowel and bladder management, skin care for decubitus ulcer prevention , pain management and ongoing medication administration  
  
  
Continue daily physician medical management: 
Seizures/ TIA/ encephalopathy 
- keppra 
- added Phenytoin ER. Will measure level in 1 week. - 10/1 monitor. Seizure precautions. - 10/2 - continue Keppra, dilantin. 10/3 - no report of new seizures.  
  
History of CVA (cerebrovascular accident) (9/26/2019) 
- left LE weakness 
- lipitor 
- aspirin 81 
- aricept - prior R frontal hemorrahagic CVA. 10/3 - stable neuro exam. No new notable focal weakness.  
  
  
UTI - + pseudomonas, sensitive to cipro, start po cipro x 5 days. Hypertension - BP uncontrolled, fluctuating, managed medically. - continue lisinopril, procardia XL 
- 10/2 - -180s. add norvasc 5. 
10/3 - SBP 130s. Monitor. HLD (hyperlipidemia) (9/26/2019) 
- lipitor 
  
CAD (coronary artery disease) - cardiac precaution.  
  
Pneumonia prophylaxis- Insentive spirometer every hour while awake 
  
DVT risk / DVT Prophylaxis-   
- SCDs. History of prostatectomy/ History of prostate cancer (9/26/2019)/ Urinary retention/ neurogenic bladder --  
scheulde voids q2h. As pt incontinent. - Check post-void residual every shift; In and Out catheterize if post-void residual is more than 400 cc. 
 
 
bowel program - incontinent 
  
GERD - resume PPI.  At times may need additional antacids, Maalox prn. 
  
  
 
 Time spent was 25 minutes with over 1/2 in direct patient care/examination, consultation and coordination of care. Signed By: Austyn Benavides MD   
 October 3, 2019

## 2019-10-04 NOTE — PROGRESS NOTES
Time In 0701 Time Out 5801 Occupational Therapy Mobility Score Comments Supine to Sit 3: Partial/Moderate A Assist with BLE, maximum verbal cueing, bedrails Sit to Supine NA  Patient did not get back into bed after shower Transfer Assist 4: Supervision or touching A Transfer Type: SPT Equipment: WC  
Comments: SPT into shower using  Activities of Daily Living Score Comments Eating 5: S/U or clean-up assist Assist opening containers Oral Hygiene NA Not tested due to did not brush teeth prior to eating breakfast, and patient was left up in recliner eating Bathing 4: Supervision or touching A Type of Shower: Shower Position: Standing PRN and Unsupported Sitting Adaptive  Equipment: Tub Transfer Bench and Othel Lyell Comments: moderate cueing for thoroughness Upper Body Dressing 5: S/U or clean-up assist Items Applied: Pullover Position: Unsupported Sitting Comments:   
Lower Body Dressing 4: Supervision or touching A Items Applied: Underwear and Fasten Pants Position: Standing PRN and Unsupported Sitting Adaptive Equipment: NA Comments:   
Donning/[place] Footwear 5: S/U or clean-up assist Items Applied: Socks and Slip-on Shoes Adaptive Equipment: NA Comments: Toilet Transfer 4: Supervision or touching A Transfer Type: SPT Equipment: WC  
Comments: Toileting Hygiene 4: Supervision or touching A Output: voided urine twice; once incontinent in brief prior to entry to room, filling brief completely; once in toilet, urine only Comments:   
Education  Transfer training, benefits of rehab, use of call bell, yellow sock policy. No evidence of learning. Pt presented supine sleeping in bed. Patient completed ADL; see above for details. Continues to require moderate to maximum cueing for thoroughness during bathing and to decrease perseveration.   
 
Patient tolerated session well with no complaint of pain and was left seated up in recliner with call light/all needs in reach and in no distress. Chair alarm activated. Continue OT POC with focus on ADL/IADL skills, cognition, functional transfers, functional mobility, coordination, strength, static and dynamic balance, and activity tolerance to maximize safety and independence with ADLs and functional transfers. Zeyad Rosen MS, OTR/L 
10/4/2019

## 2019-10-04 NOTE — PROGRESS NOTES
PHYSICAL THERAPY DAILY NOTE Time In: 1300 Time Out: 1346 Patient Seen For: PM;Balance activities;Transfer training; Therapeutic exercise;Gait training Subjective: \"I don't know why I keep falling and veering toward the left when I walk\" Objective:  Vital Signs:   
Patient Vitals for the past 12 hrs: 
 Temp Pulse Resp BP SpO2  
10/04/19 0641 98.3 °F (36.8 °C) 61 18 179/71 94 % Pain level:  Patient had no complaints of pain Patient education:  Fall prevention and safety. Patient remains highly impulsive with no regard for safety Seizure, Other (comment)(Fall precautions, ) COGNITION Daily Assessment Alert and oriented but inconsistent with place and situation. He is very [place] and remains impulsive with limited insight to his deficits. BED/MAT MOBILITY Daily Assessment Rolling Right : 5 (Supervision) Rolling Left : 5 (Supervision) Supine to Sit : 5 (Supervision) Sit to Supine : 5 (Supervision) TRANSFERS Daily Assessment Needs verbal cues for safety secondary to impulsive behavior and no regard to safety ie. ...stands with w/c foot pedals still in front of him to transfer. Transfer Type: SPT without device(impulsive and unsafe at times) Transfer Assistance : 3 (Moderate assistance ) Sit to Stand Assistance: Moderate assistance Car Transfers: Not tested GAIT Daily Assessment Verbal cues needed throughout gait to pay attention and to keep his feet inside the r/walker. Very distractible and impulsive during gait. Amount of Assistance: 3 (Moderate assistance) Distance (ft): 150 Feet x 2 Assistive Device: Walker, rolling;Gait belt BALANCE Daily Assessment Sitting - Static: Good (unsupported) Sitting - Dynamic: Fair (occasional) Standing - Static: Poor(unpredictable with falling to the left) Standing - Dynamic : Impaired LOWER EXTREMITY EXERCISES Daily Assessment Extremity: Both Exercise Type #1: Seated lower extremity strengthening(Motomed x 10 level 4) Level of Assist: Supervision Exercise Type #2: Standing lower extremity strengthening(parallel bars working on balance and weight shift activities) Sets Performed: 3 Reps Performed: 10 Level of Assist: Contact guard assistance Assessment: Patient participated and is cooperative but remains impulsive and has poor insight and no real regard for safety. Plan of Care: Continue to treat and progress as indiciated. Fidelina Martinez 
10/4/2019

## 2019-10-04 NOTE — PROGRESS NOTES
Problem: Falls - Risk of 
Goal: *Absence of Falls Description Document [facility] Fall Risk and appropriate interventions in the flowsheet. Outcome: Progressing Towards Goal 
Note:  
Fall Risk Interventions: 
Mobility Interventions: OT consult for ADLs, Patient to call before getting OOB, PT Consult for mobility concerns, PT Consult for assist device competence, Utilize walker, cane, or other assistive device Mentation Interventions: Adequate sleep, hydration, pain control, Bed/chair exit alarm, Door open when patient unattended, Evaluate medications/consider consulting pharmacy, Reorient patient, Update white board Medication Interventions: Patient to call before getting OOB, Teach patient to arise slowly, Evaluate medications/consider consulting pharmacy, Bed/chair exit alarm Elimination Interventions: Call light in reach, Patient to call for help with toileting needs History of Falls Interventions: Evaluate medications/consider consulting pharmacy, Bed/chair exit alarm, Consult care management for discharge planning, Door open when patient unattended Problem: Patient Education: Go to Patient Education Activity Goal: Patient/Family Education Outcome: Progressing Towards Goal 
  
Problem: Inpatient Rehab (Adult) Goal: *LTG: Avoids injury/falls 100% of time related to deficits Outcome: Progressing Towards Goal 
Goal: *LTG: Avoids infection 100% of time related to deficits Outcome: Progressing Towards Goal 
Goal: *LTG: Verbalize understanding of diagnosis and risk factors for recurring stroke Outcome: Progressing Towards Goal 
Goal: *LTG: Absence of DVT during hospitalization Outcome: Progressing Towards Goal 
Goal: *LTG: Maintains Skin Integrity With No Evidence of Pressure Injury 100% of Time Outcome: Progressing Towards Goal 
Goal: *Nursing Goal (Insert Text) Outcome: Progressing Towards Goal 
Goal: *Nursing Goal (Insert Text) Outcome: Progressing Towards Goal 
Goal: Interventions Outcome: Progressing Towards Goal 
  
Problem: Pressure Injury - Risk of 
Goal: *Prevention of pressure injury Description Document Jerry Scale and appropriate interventions in the flowsheet. Outcome: Progressing Towards Goal 
Note:  
Pressure Injury Interventions: 
Sensory Interventions: Check visual cues for pain, Maintain/enhance activity level, Discuss PT/OT consult with provider Moisture Interventions: Absorbent underpads, Check for incontinence Q2 hours and as needed, Maintain skin hydration (lotion/cream), Minimize layers Activity Interventions: Assess need for specialty bed, Chair cushion, Increase time out of bed, Pressure redistribution bed/mattress(bed type), PT/OT evaluation Mobility Interventions: Pressure redistribution bed/mattress (bed type), PT/OT evaluation Nutrition Interventions: Document food/fluid/supplement intake Friction and Shear Interventions: Apply protective barrier, creams and emollients, Lift sheet, Lift team/patient mobility team 
 
  
 
 
 
  
Problem: Patient Education: Go to Patient Education Activity Goal: Patient/Family Education Outcome: Progressing Towards Goal 
  
Problem: Patient Education: Go to Patient Education Activity Goal: Patient/Family Education Outcome: Progressing Towards Goal 
  
Problem: Patient Education: Go to Patient Education Activity Goal: Patient/Family Education Outcome: Progressing Towards Goal 
  
Problem: Nutrition Deficit Goal: *Optimize nutritional status Outcome: Progressing Towards Goal 
  
Problem: Patient Education: Go to Patient Education Activity Goal: Patient/Family Education Outcome: Progressing Towards Goal

## 2019-10-04 NOTE — PROGRESS NOTES
PHYSICAL THERAPY DAILY NOTE Time In: 7596 Time Out: 1044 Patient Seen For: AM;Balance activities;Transfer training; Therapeutic exercise;Gait training Subjective: \"Are you done with me yet\" Objective: Vital Signs:   
Patient Vitals for the past 12 hrs: 
 Temp Pulse Resp BP SpO2  
10/04/19 0641 98.3 °F (36.8 °C) 61 18 179/71 94 % Pain level:  No complaints of pain during therapy. Patient education:  Fall prevention and safety with all mobility. Interdisciplinary Communication:   Discussed decreased attention span and impulsive behavior with OT Seizure, Other (comment)(Fall precautions, ) COGNITION Daily Assessment Alert and oriented to name and place. Decreased attention span and easily distracted. Poor insight to his deficits and safety remains a concern. Very [place] along with delay in processing time during conversation. TRANSFERS Daily Assessment Impulsive and decreased attention to safety with SPT with r/walker. If his weight shift to the left of mid-line he cannot tell he is off balance and does not correct. Transfer Type: SPT without device Transfer Assistance : 3 (Moderate assistance ) Sit to Stand Assistance: Moderate assistance Car Transfers: Not tested GAIT Daily Assessment Patient needs constant verbal and manual cues to pay attention and stay inside the r/walker during gait. He is easily distracted and lets go of the r/walker and/or makes unsafe sudden moves with the r/walker without regard for his safety. Distance (ft): 150 Feet (ft) Assistive Device: Walker, rolling Mid/mod assistance BALANCE Daily Assessment Sitting - Static: Good (unsupported) Sitting - Dynamic: Fair (occasional) Standing - Static: Poor Standing - Dynamic : Impaired LOWER EXTREMITY EXERCISES Daily Assessment Extremity: Both Exercise Type #1: Seated lower extremity strengthening(NuStep x 10 level 3) Level of Assist: Supervision Exercise Type #2: Seated lower extremity strengthening(2# used for knee ext and hip flex) Sets Performed: 2 Reps Performed: 10 Level of Assist: Supervision Assessment: Patient continues to participate in therapy but remains easily distracted and is a safety risk secondary to his limited insight to his deficits and impulsive manner. He cooperated but appears lost most of the time. Patient had OT after his morning PT session and he was taken over to the OT table in his w/c after PT. Plan of Care: Continue to treat and progress with emphasis on safety and attention. Fidelina Salinas 
10/4/2019

## 2019-10-04 NOTE — PROGRESS NOTES
Problem: Falls - Risk of 
Goal: *Absence of Falls Description Document [facility] Fall Risk and appropriate interventions in the flowsheet. Outcome: Progressing Towards Goal 
Note:  
Fall Risk Interventions: 
Mobility Interventions: Bed/chair exit alarm, Patient to call before getting OOB, Utilize walker, cane, or other assistive device Mentation Interventions: Bed/chair exit alarm, Door open when patient unattended, More frequent rounding, Toileting rounds Medication Interventions: Bed/chair exit alarm, Patient to call before getting OOB Elimination Interventions: Call light in reach, Patient to call for help with toileting needs, Urinal in reach History of Falls Interventions: Bed/chair exit alarm, Door open when patient unattended Problem: Patient Education: Go to Patient Education Activity Goal: Patient/Family Education Outcome: Progressing Towards Goal 
  
Problem: Inpatient Rehab (Adult) Goal: *LTG: Avoids injury/falls 100% of time related to deficits Outcome: Progressing Towards Goal

## 2019-10-04 NOTE — PROGRESS NOTES
OT Daily Note Time In 1045 Time Out 1121 Subjective: No complaints. Remains very impulsive, very poor attention. Pain: none reported Education: benefits of rehab, transfer training Interdisciplinary Communication: handoff from PT Precautions: Other (comment), Seizure(fall risk, impaired cognition ) Location on arrival: handoff from PT Visual-Perceptual Daily Assessment Patient engaged in visual perceptual and problem solving test, [place] Spatial Relations test part B, with maximum cueing for attention to task and identifying and solving problems. Required entire session to complete. Remains with very poor attention, impaired working memory, and impaired carryover of training. Self-Care Daily Assessment Self Care Task: Toileting Level of Assist required:  maximum assistance Adaptive Equipment:  grab bars Transfer: Completed transfer with moderate assistance from [facility] to toilet. Patient had been incontinent of urine in brief, requiring total A to change brief. Patient did not void on toilet. Incontinence remains a barrier. No awareness by patient of when he is incontinent. Patient was left seated up in recliner with call light/all needs in reach and in no distress. Chair alarm activated. Assessment: Patient continues to require constant 1:1 close supervision for all tasks. Patient with no carryover and no recall of training. Patient's cognition does not appear to be clearing up at all. Anticipate patient will require constant supervision at discharge. Remains a very high fall risk. Plan: Continue OT POC with focus on ADL/IADL skills, functional transfers, functional mobility, cognition, coordination, strength, static and dynamic balance, and activity tolerance to maximize safety and independence with ADLs and functional transfers. Jordan Pimentel MS, OTR/L 
10/4/2019 Note may contain the following abbreviations:  
Abbreviation Explanation AROM Active range of motion PROM Passive range of motion SPT Stand pivot transfer LPT Lateral pivot transfer WC wheelchair RW Rolling walker BUE Bilateral upper extremities BLE Bilateral lower extremities WBAT Weight bearing as tolerated TTWB Toe-touch weight bearing AD Adaptive device AE Adaptive equipment NMES Neuro muscular electrical stimulation UBE Upper body ergometer

## 2019-10-04 NOTE — PROGRESS NOTES
SFD PROGRESS NOTE Edwin Shook Admit Date: 9/30/2019 Admit Diagnosis: Seizure disorder ([facility] [address] [E03.147]; Weakness [R53.1]; Encephalopathy [G93.40];CAD (coronary artery disease) [I25.10]; History of CVA (cerebrovascular accident) [Z86.73]; Impaired functional mobility, balance, gait, and endurance [Z74.09] Subjective  
 
Vss. Afebrile. Participating well in PT/OT. More alert and cooperative. Objective:  
 
Current Facility-Administered Medications Medication Dose Route Frequency  ciprofloxacin HCl (CIPRO) tablet 250 mg  250 mg Oral Q12H  
 NIFEdipine ER (PROCARDIA XL) tablet 90 mg  90 mg Oral BID  acetaminophen (TYLENOL) tablet 650 mg  650 mg Oral Q4H PRN  
 aspirin chewable tablet 81 mg  81 mg Oral DAILY  bisacodyl (DULCOLAX) tablet 5 mg  5 mg Oral DAILY PRN  
 sodium chloride (NS) flush 5-40 mL  5-40 mL IntraVENous Q8H  
 sodium chloride (NS) flush 5-40 mL  5-40 mL IntraVENous PRN  
 heparin (porcine) injection 5,000 Units  5,000 Units SubCUTAneous Q8H  
 donepezil (ARICEPT) tablet 5 mg  5 mg Oral DAILY  levETIRAcetam (KEPPRA) tablet 1,000 mg  1,000 mg Oral BID  lisinopril (PRINIVIL, ZESTRIL) tablet 20 mg  20 mg Oral BID  magnesium oxide (MAG-OX) tablet 400 mg  400 mg Oral TID  PARoxetine CR (PAXIL-CR) tablet 25 mg (Patient Supplied)  25 mg Oral DAILY  phenytoin ER (DILANTIN ER) ER capsule 200 mg  200 mg Oral BID  traZODone (DESYREL) tablet 25 mg  25 mg Oral QHS PRN  
 guaiFENesin (ROBITUSSIN) 100 mg/5 mL oral liquid 100 mg  100 mg Oral Q6H PRN  
 atorvastatin (LIPITOR) tablet 40 mg  40 mg Oral QHS Review of Systems:Denies chest pain, shortness of breath, cough, headache, visual problems, abdominal pain, dysurea, calf pain. Pertinent positives are as noted in the medical records and unremarkable otherwise. Visit Vitals /71 (BP 1 Location: Right arm, BP Patient Position: At rest) Pulse 61 Temp 98.3 °F (36.8 °C) Resp 18 SpO2 94% Physical Exam:  
     
General:   Alert. No acute distress. Has hearing aids. HEENT:  Normocephalic, EOMI, facial symmetry  Intact. Oral mucosa pink and moist. No JVD. Lungs:  CTA Bilaterally,Respiration even and unlabored No apparent use of accessory muscles for respiration. Heart:  Regular rate and rhythm, S1, S2, No obvious murmur, click, rub or gallop. Genitourinary: defered Abdomen:  Soft, non-tender to palpation in all four quadrants. Neuromuscular:  PERRL, EOMI 
LUE     Shoulder abduction  4+ /5 Elbow flexion:   5-/5 Wrist extension:   5-/5 Finger flexion;   5-/5 RUE    Shoulder abduction:5-  /5 Elbow flexion: 5  /5 Wrist extension: 5/5 Finger flexion: 5  /5 LLE     Hip flexion:  4/5 Knee extension:  4+ /5 Ankle dorsiflexion:  4+ /5 Ankle plantarflexion:  5-/5 RLE     Hip flexion:  5- /5 Knee extension:  4+ /5 Ankle dorsiflexion: 5-  /5 Ankle plantarflexion:   5-/5 Sensory - intact Plantars - down going 
   
Skin:  Intact, dry, good skin turgor, age related changes present Edema: none Functional Assessment: 
 
Balance Sitting - Static: Good (unsupported) (10/04/19 1300) Sitting - Dynamic: Fair (occasional) (10/04/19 1300) Standing - Static: Poor(unpredictable with falling to the left) (10/04/19 1300) Standing - Dynamic : Impaired (10/04/19 1300) [facility] Fall Risk Assessment: 
Donzell Jameson Risk Mobility: Ambulates or transfers with assist devices or assistance (10/04/19 0726) Mobility Interventions: Bed/chair exit alarm; Patient to call before getting OOB;Utilize walker, cane, or other assistive device (10/04/19 0726) Mentation: Periodic confusion (10/04/19 0726) Mentation Interventions: Bed/chair exit alarm; Door open when patient unattended;More frequent rounding; Toileting rounds (10/04/19 0726) Medication: Patient receiving anticonvulsants, sedatives(tranquilizers), psychotropics or hypnotics, hypoglycemics, narcotics, sleep aids, antihypertensives, laxatives, or diuretics (10/04/19 0726) Medication Interventions: Bed/chair exit alarm; Patient to call before getting OOB (10/04/19 0726) Elimination: Incontinence (10/04/19 0726) Elimination Interventions: Call light in reach; Patient to call for help with toileting needs;Urinal in reach (10/04/19 0726) Prior Fall History: Before admission in past 12 months _home or previous inpatient care) (10/04/19 8502) History of Falls Interventions: Bed/chair exit alarm; Door open when patient unattended (10/04/19 0726) Total Score: 5 (10/04/19 0726) Standard Fall Precautions: Yes (10/04/19 0051) High Fall Risk: Yes (10/04/19 0726) Speech Assessment: 
Aspiration Signs/Symptoms: None (10/01/19 1103) Ambulation: 
Gait Distance (ft): 150 Feet (ft) (10/04/19 1300) Assistive Device: Walker, rolling;Gait belt (10/04/19 1300) Labs/Studies: No results found for this or any previous visit (from the past 72 hour(s)). Assessment:  
 
Problem List as of 10/4/2019 Date Reviewed: 10/1/2019 Codes Class Noted - Resolved Weakness ICD-10-CM: R53.1 ICD-9-CM: 780.79  10/1/2019 - Present Encephalopathy ICD-10-CM: G93.40 ICD-9-CM: 348.30  10/1/2019 - Present Seizure disorder ([facility] ICD-10-CM: G40.909 ICD-9-CM: 345.90  10/1/2019 - Present Impaired functional mobility, balance, gait, and endurance ICD-10-CM: Z74.09 
ICD-9-CM: V49.89  10/1/2019 - Present Status epilepticus ([facility] ICD-10-CM: Stacy Jose ICD-9-CM: 345.3  9/28/2019 - Present  
   
 TIA (transient ischemic attack) ICD-10-CM: G45.9 ICD-9-CM: 435.9  9/26/2019 - Present Seizures ([address] ICD-10-CM: R56.9 ICD-9-CM: 780.39  9/26/2019 - Present History of CVA (cerebrovascular accident) ICD-10-CM: Z86.73 
ICD-9-CM: V12.54  9/26/2019 - Present HTN (hypertension) ICD-10-CM: I10 
ICD-9-CM: 401.9  9/26/2019 - Present HLD (hyperlipidemia) ICD-10-CM: E78.5 ICD-9-CM: 272.4  9/26/2019 - Present CAD (coronary artery disease) ICD-10-CM: I25.10 ICD-9-CM: 414.00  9/26/2019 - Present History of prostatectomy ICD-10-CM: Z90.79 ICD-9-CM: V45.89  9/26/2019 - Present History of prostate cancer ICD-10-CM: Z85.46 
ICD-9-CM: V10.46  9/26/2019 - Present Plan:  
 
Rehabilitation Plan The patient has shown the ability to tolerate and benefit from 3 hours of therapy daily and is being admitted to a comprehensive acute inpatient rehabilitation program consisting of at least 3 hours of combined physical and occupational therapies. Continue intensive Physical Therapy for a minimum of 1.5 hours a day, at least 5 out of 7 days per week to address bed mobility, transfers, ambulation, strengthening, balance, and endurance. continue intensive Occupational Therapy for a minimum of 1.5 hours a day, at least 5 out of 7 days per week to address ADL ( bathing, LE dressing, toileting) and adaptive equipment as needed. - weakness BLE, imapired balance major barriers. Becomes easily fatigued. 
  
ST - cognitive assessment, post ictal confusion. deficits include impaired reasoning, attention, expressive language, auditory processing for following directions, and immediate and short-term memory per ST. 
  
The patient will also require 24-hour skilled rehabilitation nursing for bowel and bladder management, skin care for decubitus ulcer prevention , pain management and ongoing medication administration  
  
  
Continue daily physician medical management: 
Seizures/ TIA/ encephalopathy 
- keppra 
- added Phenytoin ER. Will measure level in 1 week. - 10/1 monitor. Seizure precautions. - 10/2 - continue Keppra, dilantin. 10/3 - no report of new seizures. 10/4 check dilantin level Monday. No new seizures noted.  
  
History of CVA (cerebrovascular accident) (9/26/2019) 
- left LE weakness 
- lipitor 
- aspirin 81 
- aricept - prior R frontal hemorrahagic CVA. 10/3 - stable neuro exam. No new notable focal weakness. 10/4 - stronger, funcitonally improving daily.  
  
  
UTI - + pseudomonas, sensitive to cipro, start po cipro x 5 days. Hypertension - BP uncontrolled, fluctuating, managed medically. - continue lisinopril, procardia XL 
- 10/2 - -180s. add norvasc 5. 
10/3 - SBP 130s. Monitor. 10/4 - continue norvasc 5. Generally mildly improved BP. Monitor over the weekend. HLD (hyperlipidemia) (9/26/2019) 
- lipitor 
  
CAD (coronary artery disease) - cardiac precaution.  
  
Pneumonia prophylaxis- Insentive spirometer every hour while awake 
  
DVT risk / DVT Prophylaxis-   
- SCDs. History of prostatectomy/ History of prostate cancer (9/26/2019)/ Urinary retention/ neurogenic bladder --  
scheulde voids q2h. As pt incontinent. - Check post-void residual every shift; In and Out catheterize if post-void residual is more than 400 cc. 
 
 
bowel program - incontinent 
  
GERD - resume PPI. At times may need additional antacids, Maalox prn. 
  
  
 
Time spent was 25 minutes with over 1/2 in direct patient care/examination, consultation and coordination of care. Signed By: Rashida Covington MD   
 October 4, 2019

## 2019-10-04 NOTE — PROGRESS NOTES
10/04/19 1033 Time Spent With Patient Time In 0920 Time Out 1003 Patient Seen For: AM  
Mental Status Neurologic State Alert Orientation Level Disoriented to place; Disoriented to situation;Disoriented to time;Oriented to person Cognition Decreased attention/concentration;Decreased command following Safety/Judgement Decreased insight into deficits;Home safety; Fall prevention 10/04/19 1034 Verbal Expression Primary Mode of Expression Verbal  
Naming Impaired Responsive (%) 65 % 10/04/19 1035 Auditory Comprehension Auditory Impairment Yes Response to Basic Yes/No Questions (%) 70 % (contextual; delayed responses with repetitions needed) One-Step Basic Commands (%) 70 % (field of 3 objects) Two-Step Basic Commands (%) 40 % Patient participated with basic cognitive therapy. Responsive naming related to context based/environmental questions completed with 65% accuracy. 70% accuracy answering contextual yes/no questions with delayed responses and repetitions needed. Impaired sustained attention and immediate memory are barriers. Following one step basic commands with a field of 3 objects completed with 70% accuracy with repetitions provided. Following simple two step commands to identify body parts/items within the room with 40% accuracy often recalling only 1/2 steps and a direct model needed. Patient presents with impaired receptive/expressive deficits, impaired attention, decreased immediate and working memory, and decreased initiation with continued ST recommended.  
 
 
Nolberto Wright MS, CCC-SLP

## 2019-10-05 NOTE — PROGRESS NOTES
Problem: Falls - Risk of 
Goal: *Absence of Falls Description Document Saeed Haider Fall Risk and appropriate interventions in the flowsheet. Outcome: Progressing Towards Goal 
Note:  
Fall Risk Interventions: 
Mobility Interventions: Bed/chair exit alarm Mentation Interventions: Bed/chair exit alarm Medication Interventions: Bed/chair exit alarm Elimination Interventions: Call light in reach History of Falls Interventions: Bed/chair exit alarm Problem: Inpatient Rehab (Adult) Goal: *LTG: Avoids injury/falls 100% of time related to deficits Outcome: Progressing Towards Goal 
  
Problem: Inpatient Rehab (Adult) Goal: *LTG: Avoids infection 100% of time related to deficits Outcome: Progressing Towards Goal 
  
Problem: Inpatient Rehab (Adult) Goal: *LTG: Verbalize understanding of diagnosis and risk factors for recurring stroke Outcome: Progressing Towards Goal

## 2019-10-05 NOTE — PROGRESS NOTES
OT Daily Time In 1110 Time Out 1135 Subjective: Patient agreeable for therapy Pain: none Education: Patient educated on techniques with therapy tasks Interdisciplinary Communication: RN, PT 
Precautions: Other (comment), Seizure(fall risk, impaired cognition ) Strengthening Lilliana Hubbard Attempted to have patient perform the PVC pattern, though patient unable to follow simple commands, activity aborted. Patient performed ladder exercises using 2.5 pounds total weight, 4 sets with brief rest breaks between sets with constant cognitive cueing regarding technique. Patient unable to have carryover with lifting dowel with both hands at same time, so he lifted one side at a time. Patient participated in bilateral boaz exercises with 10 pounds total weight 10 sets x 15 reps , going fwd/bkwd and diagonal patterns, working on B strength/endurance. Assessment: Patient's decreased cognition is a barrier, though patient is steadily progressing with activity tolerance and strength with therapy tasks. Plan: Cont OT per tx plan.  
Andrés Solorzano, OT

## 2019-10-05 NOTE — PROGRESS NOTES
Pt is resting at this time . Pt participated in therapy today. No complaints noted . Safety maintained . Call bell within reach.

## 2019-10-05 NOTE — PROGRESS NOTES
Leonor Dominguez MD, Medical Director [facility] [address], [address] [facility] Tel: [phone] D PROGRESS NOTE Maria Guadalupe Diver Admit Date: 9/30/2019 Admit Diagnosis: Seizure disorder ([address] [E33.278]; Weakness [R53.1]; Encephalopathy [G93.40];CAD (coronary artery disease) [I25.10]; History of CVA (cerebrovascular accident) [Z86.73]; Impaired functional mobility, balance, gait, and endurance [Z74.09] Subjective Sleepy this morning. Ate breakfast then went back to bed. Family asleep as well. Denied any symptomatic complaints. confused Objective:  
 
Current Facility-Administered Medications Medication Dose Route Frequency  ciprofloxacin HCl (CIPRO) tablet 250 mg  250 mg Oral Q12H  
 NIFEdipine ER (PROCARDIA XL) tablet 90 mg  90 mg Oral BID  acetaminophen (TYLENOL) tablet 650 mg  650 mg Oral Q4H PRN  
 aspirin chewable tablet 81 mg  81 mg Oral DAILY  bisacodyl (DULCOLAX) tablet 5 mg  5 mg Oral DAILY PRN  
 sodium chloride (NS) flush 5-40 mL  5-40 mL IntraVENous Q8H  
 sodium chloride (NS) flush 5-40 mL  5-40 mL IntraVENous PRN  
 heparin (porcine) injection 5,000 Units  5,000 Units SubCUTAneous Q8H  
 donepezil (ARICEPT) tablet 5 mg  5 mg Oral DAILY  levETIRAcetam (KEPPRA) tablet 1,000 mg  1,000 mg Oral BID  lisinopril (PRINIVIL, ZESTRIL) tablet 20 mg  20 mg Oral BID  magnesium oxide (MAG-OX) tablet 400 mg  400 mg Oral TID  PARoxetine CR (PAXIL-CR) tablet 25 mg (Patient Supplied)  25 mg Oral DAILY  phenytoin ER (DILANTIN ER) ER capsule 200 mg  200 mg Oral BID  traZODone (DESYREL) tablet 25 mg  25 mg Oral QHS PRN  
 guaiFENesin (ROBITUSSIN) 100 mg/5 mL oral liquid 100 mg  100 mg Oral Q6H PRN  
 atorvastatin (LIPITOR) tablet 40 mg  40 mg Oral QHS Review of Systems:Denies chest pain, shortness of breath, cough, headache, visual problems, abdominal pain, dysurea, calf pain.  Pertinent positives are as noted in the medical records and unremarkable otherwise. Visit Vitals /62 (BP 1 Location: Right arm, BP Patient Position: Lying left side) Pulse 61 Temp 98.2 °F (36.8 °C) Resp 16 SpO2 94% Physical Exam:  
General: Awakens to voice. Disoriented to person, place and time No acute cardio respiratory distress. Very DANIEL [facility] HEENT: Normocephalic,no scleral icterus Oral mucosa moist without cyanosis Lungs: Clear to auscultation  bilaterally. Respiration even and unlabored Heart: Regular rate and rhythm, S1, S2 No  murmurs, clicks, rub or gallops Abdomen: Soft, non-tender, nondistended. Bowel sounds present. No organomegaly. Genitourinary: defer Neuromuscular:  
 
 Non focal motor x slt weakness on left vs right Poor attn, poor insight, poor judgement Impulsive; follows one step commands Skin/extremity: No rashes, no erythema. No calf tenderness BLE No edema Functional Assessment: 
   
   
Balance Sitting - Static: Good (unsupported) (10/04/19 1300) Sitting - Dynamic: Fair (occasional) (10/04/19 1300) Standing - Static: Poor(unpredictable with falling to the left) (10/04/19 1300) Standing - Dynamic : Impaired (10/04/19 1300) Donna Gilda Fall Risk Assessment: 
Pierre Freshwater Risk Mobility: Ambulates or transfers with assist devices or assistance (10/04/19 1959) Mobility Interventions: Bed/chair exit alarm; Patient to call before getting OOB; Strengthening exercises (ROM-active/passive); Utilize walker, cane, or other assistive device (10/04/19 1959) Mentation: Periodic confusion (10/04/19 1959) Mentation Interventions: Bed/chair exit alarm; Adequate sleep, hydration, pain control;Door open when patient unattended;Family/sitter at bedside; Increase mobility;More frequent rounding;Reorient patient (10/04/19 1959) Medication: Patient receiving anticonvulsants, sedatives(tranquilizers), psychotropics or hypnotics, hypoglycemics, narcotics, sleep aids, antihypertensives, laxatives, or diuretics (10/04/19 1959) Medication Interventions: Bed/chair exit alarm;Evaluate medications/consider consulting pharmacy; Patient to call before getting OOB; Teach patient to arise slowly (10/04/19 1959) Elimination: Incontinence (10/04/19 1959) Elimination Interventions: Call light in reach; Patient to call for help with toileting needs; Toileting schedule/hourly rounds (10/04/19 1959) Prior Fall History: Before admission in past 12 months _home or previous inpatient care) (10/04/19 1959) History of Falls Interventions: Bed/chair exit alarm; Consult care management for discharge planning;Door open when patient unattended;Utilize gait belt for transfer/ambulation (10/04/19 1959) Total Score: 5 (10/04/19 1959) Standard Fall Precautions: Yes (10/04/19 0051) High Fall Risk: Yes (10/04/19 1959) Speech Assessment: 
Aspiration Signs/Symptoms: None (10/01/19 1103) Ambulation: 
Gait Distance (ft): 150 Feet (ft)(150' x 2) (10/04/19 1300) Assistive Device: Walker, rolling;Gait belt (10/04/19 1300) Labs/Studies: No results found for this or any previous visit (from the past 72 hour(s)). Assessment:  
 
Problem List as of 10/5/2019 Date Reviewed: 10/1/2019 Codes Class Noted - Resolved Weakness ICD-10-CM: R53.1 ICD-9-CM: 780.79  10/1/2019 - Present Encephalopathy ICD-10-CM: G93.40 ICD-9-CM: 348.30  10/1/2019 - Present Seizure disorder ([facility] ICD-10-CM: G40.909 ICD-9-CM: 345.90  10/1/2019 - Present Impaired functional mobility, balance, gait, and endurance ICD-10-CM: Z74.09 
ICD-9-CM: V49.89  10/1/2019 - Present Status epilepticus ([facility] ICD-10-CM: Elisa Nearing ICD-9-CM: 345.3  9/28/2019 - Present  
   
 TIA (transient ischemic attack) ICD-10-CM: G45.9 ICD-9-CM: 435.9  9/26/2019 - Present Seizures ([address] ICD-10-CM: R56.9 ICD-9-CM: 780.39  9/26/2019 - Present History of CVA (cerebrovascular accident) ICD-10-CM: Z86.73 
ICD-9-CM: V12.54  9/26/2019 - Present HTN (hypertension) ICD-10-CM: I10 
ICD-9-CM: 401.9  9/26/2019 - Present HLD (hyperlipidemia) ICD-10-CM: E78.5 ICD-9-CM: 272.4  9/26/2019 - Present CAD (coronary artery disease) ICD-10-CM: I25.10 ICD-9-CM: 414.00  9/26/2019 - Present History of prostatectomy ICD-10-CM: Z90.79 ICD-9-CM: V45.89  9/26/2019 - Present History of prostate cancer ICD-10-CM: Z85.46 
ICD-9-CM: V10.46  9/26/2019 - Present Assessment; Seizures/? TIA/Encephalopathy Plan; 1. Sz; cont Keppra and Dilantin ER. F/u levels this upcoming week. No clinical signs of sz or med toxicity. 
-cont sz precautions 2. HTN; fairly well controlled with Zestril, procardia XL; However, -164 past 24 hrs; goal BP< 140/90 
-monitor and adjust as needed; prior notes indicate Norvasc added, not on MAR however 3 . HLD/TIA with previous hx of hemorrhagic CVA; cont Lipitor (LDL 91.2) and ASA; hx CAD 4. UTI; cont Cipro, day 4/5 for Pseudomonas 5. Cognitive and memory deficits; cont Aricept, ST efforts; ? Baseline 6. On mag supplement; mag 2.1 8d ago; recheck Mond. 7. Depression; cont Paxil 8. Cont b/b per nursing. 9. DVT prev; SCDs and sq heparin 5K q 8hrs Time spent was 25 minutes with over 1/2 in direct patient care/examination, consultation and coordination of care. Signed By: Adryan Parr MD   
 October 5, 2019

## 2019-10-05 NOTE — PROGRESS NOTES
Pt has had a good day without any complaints . Call bell within reach . Hourly rounds made . Safety maintained Bed alarm intact all day  this shift the patient HAS been cooperative all day .

## 2019-10-06 NOTE — PROGRESS NOTES
Problem: Falls - Risk of 
Goal: *Absence of Falls Description Document Faustina Oliver Fall Risk and appropriate interventions in the flowsheet. Outcome: Progressing Towards Goal 
Note:  
Fall Risk Interventions: 
Mobility Interventions: Bed/chair exit alarm, Patient to call before getting OOB, Utilize walker, cane, or other assistive device Mentation Interventions: Bed/chair exit alarm, Door open when patient unattended, Family/sitter at bedside, More frequent rounding, Toileting rounds Medication Interventions: Bed/chair exit alarm, Patient to call before getting OOB Elimination Interventions: Call light in reach, Patient to call for help with toileting needs History of Falls Interventions: Bed/chair exit alarm, Door open when patient unattended Problem: Patient Education: Go to Patient Education Activity Goal: Patient/Family Education Outcome: Progressing Towards Goal

## 2019-10-06 NOTE — PROGRESS NOTES
Brooklyn Perez MD, Medical Director Gucci Dunnarez [address], [address] [facility] Tel: [phone] D PROGRESS NOTE Fei Max Admit Date: 9/30/2019 Admit Diagnosis: Seizure disorder ([address] [B19.637]; Weakness [R53.1]; Encephalopathy [G93.40];CAD (coronary artery disease) [I25.10]; History of CVA (cerebrovascular accident) [Z86.73]; Impaired functional mobility, balance, gait, and endurance [Z74.09] Subjective Wife at bedside. Pt awakens to voice. VERY [place] . Lost hearing aids but NA found one of them. Wife with several questions regarding his sz management. Objective:  
 
Current Facility-Administered Medications Medication Dose Route Frequency  ciprofloxacin HCl (CIPRO) tablet 250 mg  250 mg Oral Q12H  
 NIFEdipine ER (PROCARDIA XL) tablet 90 mg  90 mg Oral BID  acetaminophen (TYLENOL) tablet 650 mg  650 mg Oral Q4H PRN  
 aspirin chewable tablet 81 mg  81 mg Oral DAILY  bisacodyl (DULCOLAX) tablet 5 mg  5 mg Oral DAILY PRN  
 sodium chloride (NS) flush 5-40 mL  5-40 mL IntraVENous Q8H  
 sodium chloride (NS) flush 5-40 mL  5-40 mL IntraVENous PRN  
 heparin (porcine) injection 5,000 Units  5,000 Units SubCUTAneous Q8H  
 donepezil (ARICEPT) tablet 5 mg  5 mg Oral DAILY  levETIRAcetam (KEPPRA) tablet 1,000 mg  1,000 mg Oral BID  lisinopril (PRINIVIL, ZESTRIL) tablet 20 mg  20 mg Oral BID  magnesium oxide (MAG-OX) tablet 400 mg  400 mg Oral TID  PARoxetine CR (PAXIL-CR) tablet 25 mg (Patient Supplied)  25 mg Oral DAILY  phenytoin ER (DILANTIN ER) ER capsule 200 mg  200 mg Oral BID  traZODone (DESYREL) tablet 25 mg  25 mg Oral QHS PRN  
 guaiFENesin (ROBITUSSIN) 100 mg/5 mL oral liquid 100 mg  100 mg Oral Q6H PRN  
 atorvastatin (LIPITOR) tablet 40 mg  40 mg Oral QHS Review of Systems:Denies chest pain, shortness of breath,  headache, visual problems, abdominal pain Pertinent positives are as noted in the medical records and unremarkable otherwise. Difficult to obtain due to pt condition Visit Vitals /65 (BP 1 Location: Right arm, BP Patient Position: Lying left side) Pulse (!) 57 Temp 97.6 °F (36.4 °C) Resp 16 SpO2 96% Physical Exam:  
General: Alert , oriented to self only No acute cardio respiratory distress. HEENT: Normocephalic,no scleral icterus Oral mucosa moist without cyanosis Lungs: Clear to auscultation  bilaterally. Respiration even and unlabored Heart: Regular rate and rhythm, S1, S2 No  murmurs, clicks, rub or gallops Abdomen: Soft, non-tender, nondistended. Bowel sounds present. No organomegaly. Genitourinary: defer Neuromuscular: No changes Minimally verbal. Doesn't initiate conversation but will anwer simple questions. Poor attn Skin/extremity: No rashes, no erythema. No calf tenderness BLE Ne edema Functional Assessment: 
   
   
Balance Sitting - Static: Good (unsupported) (10/04/19 1300) Sitting - Dynamic: Fair (occasional) (10/04/19 1300) Standing - Static: Poor(unpredictable with falling to the left) (10/04/19 1300) Standing - Dynamic : Impaired (10/04/19 1300) Luis Daniel Bowie Fall Risk Assessment: 
Chelsi Chaves Risk Mobility: Ambulates or transfers with assist devices or assistance (10/06/19 0711) Mobility Interventions: Bed/chair exit alarm; Patient to call before getting OOB;Utilize walker, cane, or other assistive device (10/06/19 0711) Mentation: Periodic confusion (10/06/19 0711) Mentation Interventions: Bed/chair exit alarm; Door open when patient unattended;Family/sitter at bedside; More frequent rounding; Toileting rounds (10/06/19 0859) Medication: Patient receiving anticonvulsants, sedatives(tranquilizers), psychotropics or hypnotics, hypoglycemics, narcotics, sleep aids, antihypertensives, laxatives, or diuretics (10/06/19 0711) Medication Interventions: Bed/chair exit alarm; Patient to call before getting OOB (10/06/19 0989) Elimination: Incontinence (10/06/19 0711) Elimination Interventions: Call light in reach; Patient to call for help with toileting needs (10/06/19 0711) Prior Fall History: Before admission in past 12 months _home or previous inpatient care) (10/06/19 1410) History of Falls Interventions: Bed/chair exit alarm; Door open when patient unattended (10/06/19 0711) Total Score: 5 (10/06/19 0711) Standard Fall Precautions: Yes (10/04/19 0051) High Fall Risk: Yes (10/06/19 0711) Speech Assessment: 
Aspiration Signs/Symptoms: None (10/01/19 1103) Ambulation: 
Gait Distance (ft): 150 Feet (ft)(150' x 2) (10/04/19 1300) Assistive Device: Walker, rolling;Gait belt (10/04/19 1300) Labs/Studies: No results found for this or any previous visit (from the past 72 hour(s)). Assessment:  
 
Problem List as of 10/6/2019 Date Reviewed: 10/1/2019 Codes Class Noted - Resolved Weakness ICD-10-CM: R53.1 ICD-9-CM: 780.79  10/1/2019 - Present Encephalopathy ICD-10-CM: G93.40 ICD-9-CM: 348.30  10/1/2019 - Present Seizure disorder ([facility] ICD-10-CM: G40.909 ICD-9-CM: 345.90  10/1/2019 - Present Impaired functional mobility, balance, gait, and endurance ICD-10-CM: Z74.09 
ICD-9-CM: V49.89  10/1/2019 - Present Status epilepticus ([facility] ICD-10-CM: Milka Pluck ICD-9-CM: 345.3  9/28/2019 - Present  
   
 TIA (transient ischemic attack) ICD-10-CM: G45.9 ICD-9-CM: 435.9  9/26/2019 - Present Seizures ([facility] ICD-10-CM: R56.9 ICD-9-CM: 780.39  9/26/2019 - Present History of CVA (cerebrovascular accident) ICD-10-CM: Z86.73 
ICD-9-CM: V12.54  9/26/2019 - Present HTN (hypertension) ICD-10-CM: I10 
ICD-9-CM: 401.9  9/26/2019 - Present HLD (hyperlipidemia) ICD-10-CM: E78.5 ICD-9-CM: 272.4  9/26/2019 - Present CAD (coronary artery disease) ICD-10-CM: I25.10 ICD-9-CM: 414.00  9/26/2019 - Present History of prostatectomy ICD-10-CM: Z90.79 ICD-9-CM: V45.89  9/26/2019 - Present History of prostate cancer ICD-10-CM: Z85.46 
ICD-9-CM: V10.46  9/26/2019 - Present  
   
  
 
   
Assessment; Seizures/? TIA/Encephalopathy 
  
Plan; 1. Sz; cont Keppra and Dilantin ER (added 9/28); EEG revealed active ictal tendency in the right hemisphere, thus dilantin added. No clinical signs of sz or med toxicity. 
-cont sz precautions 
-10/6 Plan was to switch Keppra to Zonergan (Zonisamide); f/u dilantin levels tomorrow 
  
2. HTN; fairly well controlled with Zestril, procardia XL; However, -164 past 24 hrs; goal BP< 140/90 
-monitor and adjust as needed; prior notes indicate Norvasc added, not on MAR however 
-10/6 -168; change to Nifedipine 60mg bid. Increase Zestril to 40mg bid. May not be beneficial/or have greater effect than prior dosing. No BB due to bradycardia intermittently with baseline HR in the 60s; add hctz 
  
3 . HLD/TIA with previous hx of hemorrhagic CVA; cont Lipitor (LDL 91.2) and ASA; hx CAD 
  
4. UTI; cont Cipro, day 4/5 for Pseudomonas; 10/6 LD abx 
  
5. Cognitive and memory deficits; cont Aricept, ST efforts; ? 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 
-10/6 per wife, since his stroke last year, he has had significant personality changes. She feels he is profoundly depressed, on Paxil 
  
6. On mag supplement; mag 2.1 8d ago; recheck Mond.  
  
7. Depression; cont Paxil 
  
8. Cont b/b per nursing.  
  
9. DVT prev; SCDs and sq heparin 5K q 8hrs 
  Time spent was 25 minutes with over 1/2 in direct patient care/examination, consultation and coordination of care. Signed By: Lilliana Bass MD   
 October 6, 2019

## 2019-10-06 NOTE — PROGRESS NOTES
Problem: Falls - Risk of 
Goal: *Absence of Falls Description Document [facility] Fall Risk and appropriate interventions in the flowsheet. Outcome: Progressing Towards Goal 
Note:  
Fall Risk Interventions: 
Mobility Interventions: Utilize walker, cane, or other assistive device Mentation Interventions: Bed/chair exit alarm, Door open when patient unattended, Family/sitter at bedside Medication Interventions: Patient to call before getting OOB Elimination Interventions: Call light in reach History of Falls Interventions: Bed/chair exit alarm, Door open when patient unattended

## 2019-10-06 NOTE — PROGRESS NOTES
Problem: Falls - Risk of 
Goal: *Absence of Falls Description Document Estefany Dejesus Fall Risk and appropriate interventions in the flowsheet. 10/6/2019 0147 by Marco Antonio Hylton RN Outcome: Progressing Towards Goal 
Note:  
Fall Risk Interventions: 
Mobility Interventions: Utilize walker, cane, or other assistive device Mentation Interventions: Bed/chair exit alarm, Door open when patient unattended, Family/sitter at bedside Medication Interventions: Patient to call before getting OOB Elimination Interventions: Call light in reach History of Falls Interventions: Bed/chair exit alarm, Door open when patient unattended 10/6/2019 0138 by Marco Antonio Hylton RN Outcome: Progressing Towards Goal 
Note:  
Fall Risk Interventions: 
Mobility Interventions: Utilize walker, cane, or other assistive device Mentation Interventions: Bed/chair exit alarm, Door open when patient unattended, Family/sitter at bedside Medication Interventions: Patient to call before getting OOB Elimination Interventions: Call light in reach History of Falls Interventions: Bed/chair exit alarm, Door open when patient unattended

## 2019-10-06 NOTE — PROGRESS NOTES
Problem: Falls - Risk of 
Goal: *Absence of Falls Description Document Saeed Haider Fall Risk and appropriate interventions in the flowsheet. Outcome: Progressing Towards Goal 
Note:  
Fall Risk Interventions: 
Mobility Interventions: Utilize walker, cane, or other assistive device Mentation Interventions: Bed/chair exit alarm, Door open when patient unattended, Family/sitter at bedside Medication Interventions: Patient to call before getting OOB Elimination Interventions: Call light in reach History of Falls Interventions: Bed/chair exit alarm, Door open when patient unattended

## 2019-10-07 NOTE — PROGRESS NOTES
10/07/19 3491 Time Spent With Patient Time In [address] [place] [address]  Time Out 9671 Patient Seen For: AM  
Mental Status Neurologic State Alert Orientation Level Disoriented to place; Disoriented to situation;Disoriented to time Cognition Memory loss;Decreased command following Perception Appears intact Perseveration Perseverates during conversation Safety/Judgement Decreased insight into deficits;Home safety 10/07/19 5271 Neuro-Linguistic Exercises Verbal Problem Solving Impaired; answering orientation questions in a field of 2 choices 5/8 Verbal Organization Impaired Memory Impaired Attention  Impaired 10/07/19 2772 Verbal Expression Naming Impaired Responsive (%) 50 % answering basic wh- questions Delayed response formation 10/07/19 4149 Auditory Comprehension Auditory Impairment Yes One-Step Basic Commands (%) 80 % (reading and following one step directions) Patient participated with basic cognitive-linguistic therapy. Right hearing aid only. Responsive naming related to patient's history and interests per son's report previous session completed with 50% accuracy. Patient instructed to repeat questions to ensure he was able to comprehend. Delayed response formation and repetitions needed. Following 1 step written directions 8/10. Patient at times reading the statement but then with decreased initiation and problem solving to complete. Answering orientation questions with a field of 2 choices completed 5/8. Patient was then asked orientation questions with written reference to use and responded with ModA. Patient with cognitive deficits at baseline; pt's family reports increased since admission. Will follow for basic cognitive-linguistic tx.  
 
Nolberto Wright MS, CCC-SLP

## 2019-10-07 NOTE — PROGRESS NOTES
OT Daily Note Time In 1030 Time Out 1115 Subjective: No complaints. Pain: none reported Education: transfer training, benefits of rehab; with wife, discussed progress and ongoing deficits Interdisciplinary Communication: handoff to PT Precautions: Other (comment), Seizure(fall risk, impaired cognition ) Location on arrival: sleeping in recliner Self-Care Daily Assessment Self Care Task: Toileting Level of Assist required:  mod A Adaptive Equipment:  grab bars Transfer: Completed transfer with steadying assist from [facility] <> toilet using grab bars. Required maximum cueing to pull brief down and steadying assist during pants management down and up. Patient completed greater than 50% of effort. Patient had been incontinent of small amount of urine in brief; required total A for brief change. Patient also voided urine in toilet. Incontinence and impaired cognition remains a barrier. Continues to fail to pull underpants down prior to initiating sit onto toilet, requiring total cueing to pull underpants down. Cognition Daily Assessment Patient completed categorization task organizing images into two discrete categories, grocery items or clothing items. Completed with maximum cueing for sustained attention to task. Impaired attention remains a primary barrier to progress. Continue to anticipate patient will require 1:1 supervision for all daily tasks. Patient was handed off to PT. Assessment: See above. Impaired cognition remains primary barrier. Plan: Continue OT POC with focus on ADL/IADL skills, functional transfers, functional mobility, cognition, coordination, strength, static and dynamic balance, and activity tolerance to maximize safety and independence with ADLs and functional transfers. Dewain Essex, MS, OTR/L 
10/7/2019 Note may contain the following abbreviations:  
Abbreviation Explanation AROM Active range of motion PROM Passive range of motion SPT Stand pivot transfer LPT Lateral pivot transfer WC wheelchair RW Rolling walker BUE Bilateral upper extremities BLE Bilateral lower extremities WBAT Weight bearing as tolerated TTWB Toe-touch weight bearing AD Adaptive device AE Adaptive equipment NMES Neuro muscular electrical stimulation UBE Upper body ergometer

## 2019-10-07 NOTE — PROGRESS NOTES
PT WEEKLY PROGRESS NOTE Time In: 1300 Time Out: 7595 Subjective: patient reporting he feels OK. Reports he can walk without the RW 
 
  
 
Objective: Vital Signs: 
Patient Vitals for the past 12 hrs: 
 Temp Pulse Resp BP SpO2  
10/07/19 0744 98 °F (36.7 °C) (!) 57 14 144/62 96 % Pain level:No c/o pain during treatment Pain location:NA Pain interventions:NA Patient education:Bed mobility training,transfer training, gait training, fall precautions, activity pacing, body mechanics,Patient with limited  understanding of patient education. Recommend follow up education. Interdisciplinary Communication:spoke with RN regarding need for sitter due to decreased safety awareness with increased risk for falling Seizure, Other (comment)(Fall precautions, ) Outcome Measures:  
 
Cognition: Orientation:A+O x 1 Communication:able to express needs verbally, able to follow one step commands with multiple cues Social Interaction:cooperating, appropriate with PT, participating, motivated to improve Problem Solving:difficulty with managing body mechanics during functional mobility due to cognitive status Memory:multiple cues to recall safe body mechanics during functional mobility with no carry over of patient education FIM SCORES Initial Assessment Weekly Progress Assessment 10/7/2019 Bed/Chair/Wheelchair Transfers 3 4 Wheelchair Mobility 0 NT Walking [place] 1 4 Steps/Stairs 0 4 Please see IRC Interdisciplinary Eval: Coordination/Balance Section for details regarding FIM score description. BED/CHAIR/WHEELCHAIR TRANSFERS Initial Assessment Weekly Progress Assessment 10/7/2019 Rolling Right 4 (Minimal assistance) 5 (Supervision) Rolling Left 4 (Minimal assistance) 5 (Supervision) Supine to Sit 3 (Moderate assistance) 5 (Supervision) Sit to Stand Moderate assistance Stand-by assistance Sit to Supine 3 (Moderate assistance) 5 (Supervision) Transfer Type SPT without device SPT without device Comments Increased time and effort to complete bed mobility and transfers. Patient using bed rail for rolling. Patient demonstrating left neglect with possible visual field deficit. Unable to assess vision due to confusion. Patient slow to respond to 1 step commands and requires multiple cues to complete bed mobility and transfers. SLow to sefl correct sitting balance and unable to correct without assistance   Increased time and effort to complete with cues for body mechanics Cues to attend to objects on his left Car Transfer Not tested Minimum assistance Car Type rehab car rehab car GROSS ASSESSMENT Weekly Progress Assessment 10/7/2019 AROM  Bilateral LE generally decreased, functional  
Strength  Bilateral LE generally decreased, functional  
Coordination  LE impaired for fine motor control Tone  LE normal  
Sensation  LE impaired for proprioception PROM  Bilateral LE generally decreased, functional  
 
POSTURE Weekly Progress Assessment 10/7/2019 Posture (WDL)  x Posture Assessment  forward head, rounded shoulders, trunk flexion. [facility] MOBILITY/MANAGEMENT Initial Assessment Weekly Progress Assessment 10/7/2019 Able to Propel 0 feet  NA  
Curbs/ramps assistance required 0 (Not tested) 0 (Not tested) Wheelchair set up assistance required 0 (Not tested)  NA Wheelchair management    NA  
 
WALKING INDEPENDENCE Initial Assessment Weekly Progress Assessment 10/7/2019 Assistive device Other (comment)(parallel bars, gait belt) Walker, rolling(No device) Ambulation assistance - level surface    min assist with multiple cues to correct deviations and for balance control. Cues to attend to objects on his left Distance 8 Feet (ft) 200 Feet (ft)(200ft x 2  and 150ft x 1 without a device, 150ft x 1 w/ RW) Comments slow shuffling gait with narrow base of support, excessive hip ext rot and balance offset posterior. Slow to respond to commands with tendency to be easily distracted. Gait training x 20ft x 1 with RW and 20ft x 1 without a device in figure 8 pattern around bolsters and min assist with multiple cues for multiple turns and balance control GAIT Weekly Progress Assessment 10/7/2019 Gait Description (WDL)  cont step through ataxic gait pattern with fluctuating width of base of support, decreased step length and step clearance Gait Abnormalities  decreased ankle PF and knee flexion at terminal stance and decreased ankle DF at initial contact. Tendency towards shuffling steps with flexed posture with ambulating with RW  
Gait trainig x 20 ft x 1 while bending down to  3 different objects from the floor with cues for safe body mechanics when picking up object. Able to complete with min assit and multiple cues for balance control and body mechanics STEPS/STAIRS Initial Assessment Weekly Progress Assessment 10/7/2019 Steps/Stairs ambulated 0 12(4 steps x 3 with 2 min sitting rest break between) Rail Use Both Both Comments Unable to ambulate up/down steps due to cognitive status and impaired balance  slow reciprocating pattern going up/down steps with cues for safe foot placement and posture correction Curbs/Ramps 0 (Not tested)  gait training up/down 20 ft ramp x 3 with min assist and cues to control gait speed going down ramp and cues for improved step clearance going up ramp. Gait training up/down 6 inch step with min assist  
 
  
 
Assessment: patient making good progress towards goals. Patient has met all STGs per eval and progressing towards LTGs. Questionable if patient will advance to modified independence with bed mobility, transfers and gait due to cognitive status. At this time if cognitive status does not improve anticipate patient will require SBA to supervision with functional mobility due to decreased safety awareness during functional mobility. patient is easily distracted during functional mobility training and has tendency to be impulsive at times. Limited to no carry over of patient education regarding fall precautions and safety awareness during functional mobility. At this time, patients gait pattern and posture is better without the RW but his balance is worse without the RW. Patient cont to have difficulty with running into objects on his left during gait training with increased frequency of running into objects on left when using RW. Will need to schedule a family training prior to D/C. Patient returned to room at end of treatment. Patient supine in bed with head of bed elevated and bed rails up x 2. Needs placed in reach of patient. Deshawn alarm on. RN in room with patient Plan of Care: Patient seen for weekly assessment. Notes updated and revised. Please see Care Plan for updated LTGs. Family Training:  to be scheduled prior to D/C Mindy Dominguez, PT 
10/7/2019

## 2019-10-07 NOTE — PROGRESS NOTES
PHYSICAL THERAPY DAILY NOTE Time In: 1115 Time Out: 9421 Patient Seen For: AM;Other (see progress notes);Gait training; Therapeutic exercise;Transfer training Subjective: Patient had no complaints. Objective: NO pain noted. Seizure, Other (comment)(Fall precautions, ) GROSS ASSESSMENT Daily Assessment COGNITION Daily Assessment BED/MAT MOBILITY Daily Assessment Supine to Sit : 5 (Supervision) Sit to Supine : 5 (Supervision) TRANSFERS Daily Assessment Transfer Type: SPT with walker Transfer Assistance : 4 (Minimal assistance) Sit to Stand Assistance: Contact guard assistance Car Transfers: Not tested GAIT Daily Assessment Amount of Assistance: 4 (Minimal assistance) Distance (ft): 200 Feet (ft) Assistive Device: Gait belt;Walker, rolling STEPS or STAIRS Daily Assessment Level of Assist : 0 (Not tested) BALANCE Daily Assessment WHEELCHAIR MOBILITY Daily Assessment LOWER EXTREMITY EXERCISES Daily Assessment Extremity: Both Exercise Type #1: Other (comment)(standing balance exs) Sets Performed: 2 Reps Performed: 10 Level of Assist: Stand-by assistance Exercise Type #2: Other (comment)(nustep x 10 minutes) Sets Performed: 1 Reps Performed: 10 Level of Assist: Supervision Assessment: Patient making good progress but is unsafe and will require 24 hour assistance at home. Plan of Care: Continue with plan of care. Joselo Wills, PTA 
10/7/2019

## 2019-10-07 NOTE — PROGRESS NOTES
PHYSICAL THERAPY DAILY NOTE 
 TIME IN 1135 TIME OUT 1215 Subjective: patient reporting he wants to go home. Reports he can walk on his own Objective:Vital Signs:NT Pain level:No c/o pain during treatment Pain location:NA Pain interventions:NA Patient education:Balance training,transfer training, gait training, fall precautions, activity pacing, body mechanics,Patient with limited understanding of patient education. Recommend follow up education. Interdisciplinary Communication:spoke with RN regarding incontinence Seizure, Other (comment)(Fall precautions, ) GROSS ASSESSMENT Daily Assessment  
  patient incontinent of urine during treatment. Mod assist to change lower body clothing COGNITION Daily Assessment Orientation:A+O x 1 Communication:able to express needs verbally, able to follow one step commands with multiple cues Social Interaction:cooperating, appropriate with PT, participating, motivated to improve Problem Solving:difficulty with managing body mechanics during functional mobility due to cognitive status Memory:multiple cues to recall safe body mechanics during functional mobility with no carry over of patient education BED/MAT MOBILITY Daily Assessment NT  
 
 
TRANSFERS Daily Assessment Min assist with cues for safety for SPT w/c<>Nu step. Min assist with w/c<>commode transfers using grab bar with cues for safe body mechanics GAIT Daily Assessment Gait training x 200ft with gait belt and min assist with multiple cues for controlling ataxia and to attend to objects on his left. Tendency to be easily distracted with loss of balance when distracted Gait training x 20 ft x 2 in figure 8 pattern around bolsters with min assist and multiple cues for making multiple turns and cues for balance control. tendency to be impulsive at times during gait training with increased risk for falling STEPS or STAIRS Daily Assessment Gait training up/down 4 steps with bilateral hand rails and min assist with cues for safe foot placement and to control speed of gait coming down steps BALANCE Daily Assessment Static standing balance without UE support on device while managing lower body clothing before and after toileting. Mod assist with clothing management  Static Sitting: Good Dynamic sitting: Fair Static standing: Fair, constant support Dynamic standing: Impaired WHEELCHAIR MOBILITY Daily Assessment NA  
 
 
LOWER EXTREMITY EXERCISES Daily Assessment Nu Step x 10 mins at level 3 with SBA and cues Assessment: Difficult to progress functional status secondary to cognitive status. Rehab tech assisted patient back to room at end of treatment Plan of Care: Continue with POC and progress as tolerated. Tanika Mclean, PT 
10/7/2019

## 2019-10-07 NOTE — PROGRESS NOTES
[facility] PROGRESS NOTE Kelly Alexander Admit Date: 9/30/2019 Admit Diagnosis: Seizure disorder ([facility] [address] [S88.894]; Weakness [R53.1]; Encephalopathy [G93.40];CAD (coronary artery disease) [I25.10]; History of CVA (cerebrovascular accident) [Z86.73]; Impaired functional mobility, balance, gait, and endurance [Z74.09] Subjective  
 
Vss. Afebrile. No new neurologic setbacks. No seizures reported. case discussed in team conference. Still reported to have cognitive deficits, likley below his baseline. Patient still impulsive, with fall risk, unsafe for ambulation independently. Objective:  
 
Current Facility-Administered Medications Medication Dose Route Frequency  NIFEdipine ER (PROCARDIA XL) tablet 60 mg  60 mg Oral BID  lisinopril (PRINIVIL, ZESTRIL) tablet 40 mg  40 mg Oral BID  hydroCHLOROthiazide (HYDRODIURIL) tablet 25 mg  25 mg Oral DAILY  acetaminophen (TYLENOL) tablet 650 mg  650 mg Oral Q4H PRN  
 aspirin chewable tablet 81 mg  81 mg Oral DAILY  bisacodyl (DULCOLAX) tablet 5 mg  5 mg Oral DAILY PRN  
 sodium chloride (NS) flush 5-40 mL  5-40 mL IntraVENous Q8H  
 sodium chloride (NS) flush 5-40 mL  5-40 mL IntraVENous PRN  
 heparin (porcine) injection 5,000 Units  5,000 Units SubCUTAneous Q8H  
 donepezil (ARICEPT) tablet 5 mg  5 mg Oral DAILY  levETIRAcetam (KEPPRA) tablet 1,000 mg  1,000 mg Oral BID  magnesium oxide (MAG-OX) tablet 400 mg  400 mg Oral TID  PARoxetine CR (PAXIL-CR) tablet 25 mg (Patient Supplied)  25 mg Oral DAILY  phenytoin ER (DILANTIN ER) ER capsule 200 mg  200 mg Oral BID  traZODone (DESYREL) tablet 25 mg  25 mg Oral QHS PRN  
 guaiFENesin (ROBITUSSIN) 100 mg/5 mL oral liquid 100 mg  100 mg Oral Q6H PRN  
 atorvastatin (LIPITOR) tablet 40 mg  40 mg Oral QHS Review of Systems:Denies chest pain, shortness of breath, cough, headache, visual problems, abdominal pain, dysurea, calf pain.  Pertinent positives are as noted in the medical records and unremarkable otherwise. Visit Vitals /71 (BP 1 Location: Left arm, BP Patient Position: Head of bed elevated (Comment degrees)) Pulse 66 Temp 98.2 °F (36.8 °C) Resp 16 SpO2 99% Physical Exam:  
     
General:   Alert. No acute distress. Has hearing aids. HEENT:  Normocephalic, EOMI, facial symmetry  Intact. Oral mucosa pink and moist. No JVD. Lungs:  CTA Bilaterally,Respiration even and unlabored No apparent use of accessory muscles for respiration. Heart:  Regular rate and rhythm, S1, S2, No obvious murmur, click, rub or gallop. Genitourinary: defered Abdomen:  Soft, non-tender to palpation in all four quadrants. Neuromuscular:  PERRL, EOMI 
LUE     Shoulder abduction  4+ /5 Elbow flexion:   5-/5 Wrist extension:   5-/5 Finger flexion;   5-/5 RUE    Shoulder abduction:5-  /5 Elbow flexion: 5  /5 Wrist extension: 5/5 Finger flexion: 5  /5 LLE     Hip flexion:  4/5 Knee extension:  4+ /5 Ankle dorsiflexion:  4+ /5 Ankle plantarflexion:  5-/5 RLE     Hip flexion:  5- /5 Knee extension:  4+ /5 Ankle dorsiflexion: 5-  /5 Ankle plantarflexion:   5-/5 Sensory - intact Plantars - down going 
   
Skin:  Intact, dry, good skin turgor, age related changes present Edema: none Functional Assessment: 
 
Balance Sitting - Static: Good (unsupported) (10/07/19 1300) Sitting - Dynamic: Fair (occasional) (10/07/19 1300) Standing - Static: Fair (10/07/19 1300) Standing - Dynamic : Impaired (10/07/19 1300) [facility] Fall Risk Assessment: 
Alberta Alberts Risk Mobility: Ambulates or transfers with assist devices or assistance (10/07/19 7322) Mobility Interventions: Utilize walker, cane, or other assistive device (10/07/19 0756) Mentation: Alert, oriented x 3 (10/07/19 0756) Mentation Interventions: Door open when patient unattended;Familiar objects from home;Family/sitter at bedside; Increase mobility (10/07/19 0756) Medication: Patient receiving anticonvulsants, sedatives(tranquilizers), psychotropics or hypnotics, hypoglycemics, narcotics, sleep aids, antihypertensives, laxatives, or diuretics (10/07/19 0756) Medication Interventions: Bed/chair exit alarm; Patient to call before getting OOB; Teach patient to arise slowly (10/07/19 0756) Elimination: Incontinence (10/07/19 0756) Elimination Interventions: Bed/chair exit alarm;Call light in reach; Patient to call for help with toileting needs (10/07/19 0756) Prior Fall History: Before admission in past 12 months _home or previous inpatient care) (10/07/19 0756) History of Falls Interventions: Bed/chair exit alarm; Door open when patient unattended (10/07/19 0756) Total Score: 4 (10/07/19 0756) Standard Fall Precautions: Yes (10/04/19 0051) High Fall Risk: Yes (10/07/19 0756) Speech Assessment: 
Aspiration Signs/Symptoms: None (10/01/19 1103) Ambulation: 
Gait Distance (ft): 200 Feet (ft)(200ft x 2  and 150ft x 1 without a device, 150ft x 1 w/ RW) (10/07/19 1300) Assistive Device: Walker, rolling(No device) (10/07/19 1300) Rail Use: Both (10/07/19 1300) Labs/Studies: 
Recent Results (from the past 72 hour(s)) MAGNESIUM Collection Time: 10/07/19  7:03 AM  
Result Value Ref Range Magnesium 2.3 1.8 - 2.4 mg/dL PHENYTOIN Collection Time: 10/07/19  7:03 AM  
Result Value Ref Range Phenytoin 5.9 (L) 10 - 20 ug/mL Assessment:  
 
Problem List as of 10/7/2019 Date Reviewed: 10/1/2019 Codes Class Noted - Resolved Weakness ICD-10-CM: R53.1 ICD-9-CM: 780.79  10/1/2019 - Present Encephalopathy ICD-10-CM: G93.40 ICD-9-CM: 348.30  10/1/2019 - Present Seizure disorder ([facility] ICD-10-CM: G40.909 ICD-9-CM: 345.90  10/1/2019 - Present Impaired functional mobility, balance, gait, and endurance ICD-10-CM: Z74.09 
ICD-9-CM: V49.89  10/1/2019 - Present Status epilepticus ([facility] ICD-10-CM: Gemma Senna ICD-9-CM: 345.3  9/28/2019 - Present  
   
 TIA (transient ischemic attack) ICD-10-CM: G45.9 ICD-9-CM: 435.9  9/26/2019 - Present Seizures ([facility] ICD-10-CM: R56.9 ICD-9-CM: 780.39  9/26/2019 - Present History of CVA (cerebrovascular accident) ICD-10-CM: Z86.73 
ICD-9-CM: V12.54  9/26/2019 - Present HTN (hypertension) ICD-10-CM: I10 
ICD-9-CM: 401.9  9/26/2019 - Present HLD (hyperlipidemia) ICD-10-CM: E78.5 ICD-9-CM: 272.4  9/26/2019 - Present CAD (coronary artery disease) ICD-10-CM: I25.10 ICD-9-CM: 414.00  9/26/2019 - Present History of prostatectomy ICD-10-CM: Z90.79 ICD-9-CM: V45.89  9/26/2019 - Present History of prostate cancer ICD-10-CM: Z85.46 
ICD-9-CM: V10.46  9/26/2019 - Present Plan:  
 
Rehabilitation Plan The patient has shown the ability to tolerate and benefit from 3 hours of therapy daily and is being admitted to a comprehensive acute inpatient rehabilitation program consisting of at least 3 hours of combined physical and occupational therapies. Continue intensive Physical Therapy for a minimum of 1.5 hours a day, at least 5 out of 7 days per week to address bed mobility, transfers, ambulation, strengthening, balance, and endurance. continue intensive Occupational Therapy for a minimum of 1.5 hours a day, at least 5 out of 7 days per week to address ADL ( bathing, LE dressing, toileting) and adaptive equipment as needed. - weakness BLE, imapired balance major barriers. Becomes easily fatigued. - 10/7 per PTgait pattern -step through ataxic gait pattern with fluctuating width of base of support, decreased step length and step clearance ST - cognitive assessment, post ictal confusion. deficits include impaired reasoning, attention, expressive language, auditory processing for following directions, and immediate and short-term memory per ST. 
- minimal cognitive clearance.  
  
The patient will also require 24-hour skilled rehabilitation nursing for bowel and bladder management, skin care for decubitus ulcer prevention , pain management and ongoing medication administration  
  
  
Continue daily physician medical management: 
Seizures/ TIA/ encephalopathy 
- keppra 
- added Phenytoin ER. Will measure level in 1 week. - 10/1 monitor. Seizure precautions. - 10/2 - continue Keppra, dilantin. 10/3 - no report of new seizures. 10/4 check dilantin level Monday. No new seizures noted. 10/7 - dilantin sub therapeutic. However will keep current dose. Recheck on Friday. 10/8 - no new seizures. Continue dilantin, keppra at current dose.  
  
History of CVA (cerebrovascular accident) (9/26/2019) 
- left LE weakness 
- lipitor 
- aspirin 81 
- aricept - prior R frontal hemorrahagic CVA. 10/3 - stable neuro exam. No new notable focal weakness. 10/4 - stronger, funcitonally improving daily. 10/7 - stable neuro exam. No regression.  
  
  
UTI - + pseudomonas, sensitive to cipro, start po cipro x 5 days. - 10/8 -Treatment finished. Hypertension - BP uncontrolled, fluctuating, managed medically. - continue lisinopril, procardia XL 
- 10/2 - -180s. add norvasc 5. 
10/3 - SBP 130s. Monitor. 10/4 - continue norvasc 5. Generally mildly improved BP. Monitor over the weekend. 10/8 - BP in fair control. Mild fluctuations. May need to increase norvasc. HLD (hyperlipidemia) (9/26/2019) 
- lipitor 
  
CAD (coronary artery disease) - cardiac precaution.  
  
Pneumonia prophylaxis- Insentive spirometer every hour while awake 
  
DVT risk / DVT Prophylaxis-   
- SCDs. History of prostatectomy/ History of prostate cancer (9/26/2019)/ Urinary retention/ neurogenic bladder --  
scheulde voids q2h. As pt incontinent. - Check post-void residual every shift; In and Out catheterize if post-void residual is more than 400 cc. 
 
 
bowel program - incontinent 
  
GERD - resume PPI. At times may need additional antacids, Maalox prn. 
  
  
 
Time spent was 25 minutes with over 1/2 in direct patient care/examination, consultation and coordination of care. Signed By: Em Dill MD   
 October 7, 2019

## 2019-10-07 NOTE — PROGRESS NOTES
Time In 7685 Time Out 8397 Occupational Therapy WEEKLY PROGRESS NOTE Mobility Score Comments Rolling Supine to Sit 4: Supervision or touching A Verbal cueing; bed rails Sit to Supine Transfer Assist 4: Supervision or touching A Transfer Type: SPT Equipment: Rolling Walker and tub transfer bench, grab bars Comments: required steadying assist throughout transfer, very impulsive with RW, no safety awareness, no righting reactions noted Activities of Daily Living Score Comments Eating NA Breakfast hadn't arrived yet Oral Hygiene 4: Supervision or touching A Required min A for balance, no righting reactions noted, LOB to posterior requiring constant min A to correct Bathing 4: Supervision or touching A Type of Shower: Shower Position: Standing PRN and Unsupported Sitting Adaptive  Equipment: Tub Transfer Bench and Docia Rump Comments: required verbal and visual cueing for thoroughness, CGA in standing for merissa care, LOB to posterior requiring CGA to correct Upper Body Dressing 5: S/U or clean-up assist Items Applied: Pullover Position: Unsupported Sitting Comments: setup Lower Body Dressing 4: Supervision or touching A Items Applied: Elastic Pants Position: Standing PRN and Unsupported Sitting Adaptive Equipment: none Comments: CGA in standing; donned incontinence diaper with total A Donning/[place] Footwear 5: S/U or clean-up assist Items Applied: Socks and Slip-on Shoes Adaptive Equipment: none Comments: setup assist   
Toilet Transfer 4: Supervision or touching A Transfer Type: SPT Equipment: Royetta Breath Comments: constant min A for balance, maximum verbal cueing Toileting Hygiene 5: S/U or clean-up assist Output: urine x 2 Comments: incontinent in brief, urinated in toilet Education  Benefits of rehab, ADL training Pt presented supine in bed. . Patient completed ADL; see above for details. Patient continues to require close 1:1 supervision for all ADL tasks. Patient tolerated session well with no complaint of pain and was left seated up in recliner with call light/all needs in reach and in no distress. Chair alarm activated. Continue OT POC with focus on ADL/IADL skills, cognition, safety awareness, family training, functional transfers, functional mobility, coordination, strength, static and dynamic balance, and activity tolerance to maximize safety and independence with ADLs and functional transfers. Jose Doll MS, OTR/L 
10/7/2019

## 2019-10-08 NOTE — PROGRESS NOTES
PHYSICAL THERAPY DAILY NOTE Time In: 1347 Time Out: 8685 Patient Seen For: PM;Gait training; Other (see progress notes); Therapeutic exercise;Transfer training Subjective: Patient seemed frustrated with exs. Objective: No pain noted. Seizure, Other (comment)(Fall precautions, ) GROSS ASSESSMENT Daily Assessment COGNITION Daily Assessment BED/MAT MOBILITY Daily Assessment Supine to Sit : 5 (Supervision) Sit to Supine : 5 (Supervision) TRANSFERS Daily Assessment Transfer Type: SPT without device Transfer Assistance : 4 (Minimal assistance) Sit to Stand Assistance: Stand-by assistance Car Transfers: Not tested Car Type: rehab car GAIT Daily Assessment Amount of Assistance: 4 (Minimal assistance) Distance (ft): 200 Feet (ft) Assistive Device: Walker, rolling STEPS or STAIRS Daily Assessment Level of Assist : 0 (Not tested) BALANCE Daily Assessment WHEELCHAIR MOBILITY Daily Assessment LOWER EXTREMITY EXERCISES Daily Assessment Extremity: Both Exercise Type #1: Supine lower extremity strengthening Sets Performed: 1 Reps Performed: 20 Level of Assist: Stand-by assistance Exercise Type #2: Other (comment)(nustep x 10 minutes) Sets Performed: 1 Reps Performed: 10 Level of Assist: Supervision Assessment: Patient making good progress. Plan of Care: Continue with plan of care. Beatrice Singh PTA 
10/8/2019

## 2019-10-08 NOTE — PROGRESS NOTES
WEEKLY PROGRESS NOTE 
 
 10/08/19 0859 Time Spent With Patient Time In 6016 Time Out 9516 Patient Seen For: AM  
Mental Status Neurologic State Alert Orientation Level Disoriented to place; Disoriented to situation;Disoriented to time Cognition Decreased attention/concentration;Memory loss;Decreased command following Perseveration Perseverates during conversation Safety/Judgement Decreased insight into deficits;Home safety; Fall prevention 10/08/19 0900 Neuro-Linguistic Exercises Verbal Problem Solving Impaired Memory Impaired; orientation questions field of 2  
5/8 Attention  Impaired 10/08/19 0900 Auditory Comprehension Auditory Impairment Yes Hearing Aid Right Response to Basic Yes/No Questions (%) 75 % One-Step Basic Commands (%) 60 % (with 3 objects) 10/08/19 5320 Comprehension (Native Language) Primary Mode of Comprehension Auditory Score 3 Expression (Native Language) Primary Mode of Expression Verbal  
Score 4 Social Interaction/Pragmatics Score 3 Problem Solving Score 2 Memory Score 1 Patient participated with cognitive-linguistic therapy. Impaired attention to task and decreased initiation persist. 
General yes/no questions answered with 75% accuracy. Patient at times making a statement related to a question asked several minutes before with re-orientation needed. Delayed response formation and repetitions required. Basic reasoning to answer wh- questions required MaxA. Patient able to choose more appropriate response in multiple choice format 1:2. 
Orientation questions within a field of two written responses completed 5/8 accurately with verbal/visual cues. 60% accuracy following one step directions with 3 objects. Inability to complete directions involving more than one item at a time. Decreased attention, processing, and working memory are ongoing with close supervision recommended at discharge. Chirag Peters MS, CCC-SLP

## 2019-10-08 NOTE — PROGRESS NOTES
OT Daily Note Time In 1300 Time Out 1345 Subjective: \"2618. \" [when asked what year it is] Pain: none reported Education: benefits of rehab, [facility] management Interdisciplinary Communication: handoff to PT Precautions: Other (comment), Seizure(fall risk, impaired cognition ) Location on arrival: supine in bed Transfers Daily Assessment Patient transferred supine to sit with supervision using bedrails and with verbal cueing. Donned B shoes with setup assist. Transferred edge of bed to  with CGA. Progressing well. Would benefit from further transfer training. Activity Tolerance Daily Assessment Patient completed UBE x 12 minutes x moderate resistance, going in 2 direction(s) with 1 rest break(s), working on BUE strengthening and functional activity tolerance. Progressing steadily with intensity and duration of task. Self-Care Daily Assessment Patient completed shaving using electric razor with moderate assistance for thoroughness. Required increased time to complete task. Continues to benefit from functional ADL tasks. Progressing with routine ADL tasks. Patient was handed off to PT. Assessment: Improved performance with all tasks today. Remains confused and disoriented, but surprisingly recalled both the rehab tech's name as well as the physician's name today. Plan: Continue OT POC with focus on ADL/IADL skills, functional transfers, cognition, functional mobility, coordination, strength, static and dynamic balance, and activity tolerance to maximize safety and independence with ADLs and functional transfers. Kirk Martinez MS, OTR/L 
10/8/2019 Note may contain the following abbreviations:  
Abbreviation Explanation AROM Active range of motion PROM Passive range of motion SPT Stand pivot transfer LPT Lateral pivot transfer WC wheelchair RW Rolling walker BUE Bilateral upper extremities BLE Bilateral lower extremities WBAT Weight bearing as tolerated TTWB Toe-touch weight bearing AD Adaptive device AE Adaptive equipment NMES Neuro muscular electrical stimulation UBE Upper body ergometer

## 2019-10-08 NOTE — PROGRESS NOTES
Problem: Patient Education: Go to Patient Education Activity Goal: Patient/Family Education Outcome: Progressing Towards Goal 
  
Problem: Inpatient Rehab (Adult) Goal: *LTG: Avoids injury/falls 100% of time related to deficits Outcome: Progressing Towards Goal 
Goal: *LTG: Avoids infection 100% of time related to deficits Outcome: Progressing Towards Goal 
Goal: *LTG: Verbalize understanding of diagnosis and risk factors for recurring stroke Outcome: Progressing Towards Goal 
Goal: *LTG: Absence of DVT during hospitalization Outcome: Progressing Towards Goal 
Goal: *LTG: Maintains Skin Integrity With No Evidence of Pressure Injury 100% of Time Outcome: Progressing Towards Goal 
Goal: Interventions Outcome: Progressing Towards Goal 
  
Problem: Patient Education: Go to Patient Education Activity Goal: Patient/Family Education Outcome: Progressing Towards Goal 
  
Problem: Patient Education: Go to Patient Education Activity Goal: Patient/Family Education Outcome: Progressing Towards Goal 
  
Problem: Patient Education: Go to Patient Education Activity Goal: Patient/Family Education Outcome: Progressing Towards Goal 
  
Problem: Nutrition Deficit Goal: *Optimize nutritional status Outcome: Progressing Towards Goal 
  
Problem: Patient Education: Go to Patient Education Activity Goal: Patient/Family Education Outcome: Progressing Towards Goal

## 2019-10-08 NOTE — PROGRESS NOTES
Time In 2340 Time Out 8953 Occupational Therapy Mobility Score Comments Rolling Supine to Sit 4: Supervision or touching A Moderate verbal cueing for technique Sit to Supine NA Did not get back into bed Transfer Assist 4: Supervision or touching A Transfer Type: SPT Equipment: WC  
Comments: moderate verbal cueing for technique Activities of Daily Living Score Comments Eating 5: S/U or clean-up assist Setup assist   
Oral Hygiene NA Did not brush teeth prior to eating breakfast; eating breakfast when session ended Bathing 4: Supervision or touching A Type of Shower: Shower Position: Standing PRN and Unsupported Sitting Adaptive  Equipment: Tub Transfer Bench and Zeynep Hillock Comments: steadying assist in standing; cueing for thoroughness Upper Body Dressing 5: S/U or clean-up assist Items Applied: Pullover Position: Unsupported Sitting Comments: setup Lower Body Dressing 4: Supervision or touching A Items Applied: Pants with fasteners Position: Standing PRN and Unsupported Sitting Adaptive Equipment: N/A Comments: steadying assist during pants management up; diaper due to incontinence Donning/[place] Footwear 5: S/U or clean-up assist Items Applied: Socks and Slip-on shoes Adaptive Equipment: N/A Comments: setup Toilet Transfer 4: Supervision or touching A Transfer Type: SPT Equipment: WC  
Comments: steadying assist  
Toileting Hygiene 5: S/U or clean-up assist Output: urine only Comments:   
Education  Therapy schedule, benefits of rehab, transfer training, yellow sock policy, use of call bell Pt presented long sitting in bed, sleeping heavily. Breakfast had been setup but patient had fallen back asleep and required moderate tactile cueing to awaken. Patient completed ADL; see above for details. No carryover of training from previous sessions.   
 
Patient tolerated session well with no complaint of pain and was left seated up in recliner with call light/all needs in reach and in no distress. Chair alarm activated. Continue OT POC with focus on ADL/IADL skills, family training, cognition, functional transfers, functional mobility, coordination, strength, static and dynamic balance, and activity tolerance to maximize safety and independence with ADLs and functional transfers. Chivodell File, MS, OTR/L 
10/8/2019

## 2019-10-08 NOTE — PROGRESS NOTES
[facility] - INPATIENT Face to Face Encounter Patients Name: Arleen Melvin    YOB: 1945 Ordering Physician: Anabelle Snow Primary Diagnosis: Seizure disorder ([facility] [email or web address] [E37.705] Weakness [R53.1] Encephalopathy [G93.40] CAD (coronary artery disease) [I25.10] History of CVA (cerebrovascular accident) [Z86.73] Impaired functional mobility, balance, gait, and endurance [Z74.09] Date of Face to Face:   10/8/2019 Face to Face Encounter findings are related to primary reason for home care:   yes. 1. I certify that the patient needs intermittent care as follows: physical therapy: strengthening, stretching/ROM, transfer training, gait/stair training, balance training and pt/caregiver education 
occupational therapy:  ADL safety (ie. cooking, bathing, dressing), ROM and pt/caregiver education 
speech therapy:  oral motor development, cognition training, vital stimulation, swallowing education, articulation and pt/caregiver education 2. I certify that this patient is homebound, that is: 1) patient requires the use of a walker and wheelchair device, special transportation, or assistance of another to leave the home; or 2) patient's condition makes leaving the home medically contraindicated; and 3) patient has a normal inability to leave the home and leaving the home requires considerable and taxing effort. Patient may leave the home for infrequent and short duration for medical reasons, and occasional absences for non-medical reasons. Homebound status is due to the following functional limitations: Patient with strength deficits limiting the performance of all ADL's without caregiver assistance or the use of an assistive device. Patient with poor safety awareness and is at risk for falls without assistance of another person and the use of an assistive device.   Patient with poor ambulation endurance limiting their safe ability to ascend/descend the required number of steps to leave the home. 3. I certify that this patient is under my care and that I, or a nurse practitioner or [phone], or clinical nurse specialist, or certified nurse midwife, working with me, had a Face-to-Face Encounter that meets the physician Face-to-Face Encounter requirements. The following are the clinical findings from the Face-to-Face encounter that support the need for skilled services and is a summary of the encounter: See Hospital Chart See Hospital Chart [place] [facility] 10/8/2019 THE FOLLOWING TO BE COMPLETED BY THE COMMUNITY PHYSICIAN: 
 
I concur with the findings described above from the F2F encounter that this patient is homebound and in need of a skilled service. Certifying Physician: _____________________________________ Printed Certifying Physician Name: _____________________________________ Date: _________________

## 2019-10-08 NOTE — PROGRESS NOTES
Interdisciplinary Conference Notes Interdisciplinary conference completed to discuss plan of care. Estimated D/C Date: 10/15/2019 Recommended Follow-Up Therapy: Home Health  PT, OT, ST, Nursing, Aide and Social Work Communication with family/caregivers: Spoke with patient he was shaving with occupational therapy. Called wife at home [place] Reji [phone] and gave update.

## 2019-10-08 NOTE — PROGRESS NOTES
PHYSICAL THERAPY DAILY NOTE Time In: 8105 Time Out: 1001 Patient Seen For: AM;Other (see progress notes);Gait training; Therapeutic exercise;Transfer training Subjective: Patient had no complaints. Objective: No pain noted. Seizure, Other (comment)(Fall precautions, ) GROSS ASSESSMENT Daily Assessment COGNITION Daily Assessment  
 confused BED/MAT MOBILITY Daily Assessment Supine to Sit : 5 (Supervision) Sit to Supine : 5 (Supervision) TRANSFERS Daily Assessment Transfer Type: SPT without device Transfer Assistance : 4 (Minimal assistance) Sit to Stand Assistance: Stand-by assistance Car Transfers: Minimum assistance Car Type: rehab car GAIT Daily Assessment Amount of Assistance: 4 (Minimal assistance) Distance (ft): 200 Feet (ft) Assistive Device: Walker, rolling STEPS or STAIRS Daily Assessment Level of Assist : 0 (Not tested) BALANCE Daily Assessment WHEELCHAIR MOBILITY Daily Assessment LOWER EXTREMITY EXERCISES Daily Assessment Extremity: Both Exercise Type #1: Other (comment)(nustep x 12 minutes) Sets Performed: 1 Reps Performed: 10 Level of Assist: Stand-by assistance Assessment: Patient impulsive. and at times can not follow directions with exercises. Plan of Care: Continue with plan of care. Yesica Solorzano PTA 
10/8/2019

## 2019-10-08 NOTE — PROGRESS NOTES
Patient agrees with [facility] upon discharge. Order, referral, face to face and placed on the AVS.  Details given to patient and family.  Ordered:  PT/OT/ST/AIDE/CASSI

## 2019-10-08 NOTE — PROGRESS NOTES
PSYCHOLOGY PROGRESS NOTE Name:  Sade Myrick Date of Service: 10/8/2019 Location of Service:   906/01 Type of Service: Health and Behavior Intervention (69432) Duration:  15 minutes Primary Diagnosis: 1. History of prostate cancer 2. History of prostatectomy 3. Seizures ([facility] [address] 4. Status epilepticus ([facility] [address] 5. Coronary artery disease involving native coronary artery of native heart, angina presence unspecified 6. Encephalopathy 7. History of CVA (cerebrovascular accident) Summary of Service:  Engaged in initial with patient. Mr Matt Johnson had difficulty focusing on questions and would often lose his place while speaking, resulting in long delays. He remains disoriented and unable to demonstrate insight into his functional and cognitive deficits. Recommend supervision upon discharge. History: Sade Myrick was unable to effectively answer any history items. Mental Status Individuals Condition Within Normal Limits If notable, list the changes in individuals condition:   
Appearance and Behavior: [x]  Yes      [] No   
Mood and Affect: 
 [x]  Yes      [] No   
Speech: 
 [x]  Yes      [x] No   
Thought Process: 
 []  Yes      [x] No Attention impaired Thought Content: 
 [x]  Yes      [] No   
Cognition []  Yes      [x] No Lacks insight Recommendations/Initial Treatment Plan: It is recommended that Sade Myrick 's treatment team consider the results of this evaluation as they pertain to relevant treatment and discharge planning. Plan to convey results to team. 
 
Recommend 24/7 supervision upon discharge. Kristen Zepeda Psy.D.  
Psychologist

## 2019-10-09 NOTE — PROGRESS NOTES
PHYSICAL THERAPY DAILY NOTE Time In: 2066 Time Out: 0581 Patient Seen For: PM;Other (see progress notes);Gait training; Therapeutic exercise;Transfer training Subjective: Patient had no complaints. Objective: No pain noted. Seizure, Other (comment)(Fall precautions, ) GROSS ASSESSMENT Daily Assessment COGNITION Daily Assessment BED/MAT MOBILITY Daily Assessment Supine to Sit : 5 (Supervision) Sit to Supine : 5 (Supervision) TRANSFERS Daily Assessment Transfer Type: SPT without device Transfer Assistance : 4 (Minimal assistance) Sit to Stand Assistance: Stand-by assistance Car Transfers: Not tested GAIT Daily Assessment Amount of Assistance: 4 (Minimal assistance) Distance (ft): 200 Feet (ft) Assistive Device: Walker, rolling STEPS or STAIRS Daily Assessment Level of Assist : 0 (Not tested) BALANCE Daily Assessment WHEELCHAIR MOBILITY Daily Assessment LOWER EXTREMITY EXERCISES Daily Assessment Extremity: Both Exercise Type #1: Supine lower extremity strengthening Sets Performed: 1 Reps Performed: 20 Level of Assist: Minimal assistance Assessment: Patient making good progress. Plan of Care: Continue with plan of care. Wilbur Dinero PTA 
10/9/2019

## 2019-10-09 NOTE — PROGRESS NOTES
Time In 0745 Time Out 1333 Occupational Therapy Daily Note Mobility Score Comments Rolling Supine to Sit 6: Independent Using bedrails Sit to Supine NA Did not get back into bed Transfer Assist 4: Supervision or touching A Transfer Type: SPT Equipment: Rolling Walker and tub transfer bench Comments: continues to require steadying assist during transfers; very poor safety awareness and no carryover of training Activities of Daily Living Score Comments Eating 6: Independent Oral Hygiene 4: Supervision or touching A Continues to require steadying assist for balance when standing; limited righting reactions noted with ongoing posterior LOB. Brushed teeth for 15 seconds; did not respond to cueing to brush teeth longer. Bathing 4: Supervision or touching A Type of Shower: Shower Position: Standing PRN and Unsupported Sitting Adaptive  Equipment: Tub Transfer Bench and Castillo Poster Comments: ongoing decreased balance in standing, very poor safety awareness, and no insight into deficits Upper Body Dressing 5: S/U or clean-up assist Items Applied: Pullover Position: Unsupported Sitting Comments: setup Lower Body Dressing 5: S/U or clean-up assist Items Applied: Underwear and Pants with fasteners Position: Standing PRN and Unsupported Sitting Adaptive Equipment: RW 
Comments: setup Donning/Algood Footwear 5: S/U or clean-up assist Items Applied: Socks and Slip-on shoes Adaptive Equipment: N/A Comments: setup Toilet Transfer 4: Supervision or touching A Transfer Type: SPT Equipment: Suzy Sabillon Comments: steadying assist, maximum verbal cueing Toileting Hygiene 4: Supervision or touching A Output: urine in brief (incontinent) and urine in toilet; also incontinent of urine on floor and unaware, requiring total A to direct awareness to urination on floor and to return to toilet; total A to clean up Comments: Education  Transfer training, yellow sock policy, re-oriented to place and situation Patient presented supine in bed. Patient completed ADL; see above for details. Remains impulsive and with poor safety awareness throughout ADL tasks. Requires cueing for thoroughness throughout ADL tasks. Continue to anticipate patient will require 1:1 supervision for all ADL tasks in addition to 24/7 supervision due to very high fall risk and very impaired cognition. Patient tolerated session well with no complaint of pain and was left seated up in recliner with call light/all needs in reach and in no distress. Chair alarm activated. Continue OT POC with focus on ADL/IADL skills, functional transfers, functional mobility, coordination, strength, static and dynamic balance, and activity tolerance to maximize safety and independence with ADLs and functional transfers. Dorina Moe MS, OTR/L 
10/9/2019

## 2019-10-09 NOTE — PROGRESS NOTES
PHYSICAL THERAPY DAILY NOTE Time In: 6590 Time Out: 1002 Patient Seen For: AM;Other (see progress notes);Gait training; Therapeutic exercise;Transfer training Subjective: Patient had no complaints. Objective: No pain noted. Patient has difficulty following directions. Seizure, Other (comment)(Fall precautions, ) GROSS ASSESSMENT Daily Assessment COGNITION Daily Assessment BED/MAT MOBILITY Daily Assessment Supine to Sit : 5 (Supervision) Sit to Supine : 5 (Supervision) TRANSFERS Daily Assessment Transfer Type: SPT without device Transfer Assistance : 4 (Minimal assistance) Sit to Stand Assistance: Stand-by assistance Car Transfers: Not tested GAIT Daily Assessment Amount of Assistance: 4 (Minimal assistance) Distance (ft): 200 Feet (ft) Assistive Device: Walker, rolling STEPS or STAIRS Daily Assessment Level of Assist : 0 (Not tested) BALANCE Daily Assessment WHEELCHAIR MOBILITY Daily Assessment LOWER EXTREMITY EXERCISES Daily Assessment Extremity: Both Exercise Type #1: Supine lower extremity strengthening Sets Performed: 1 Reps Performed: 20 Level of Assist: Minimal assistance Assessment: Patient is impulsive and will require 24 hour assistance at home. Plan of Care: Continue with plan of care. Yazan Vgea, COLE 
10/9/2019

## 2019-10-09 NOTE — PROGRESS NOTES
10/09/19 1011 Time Spent With Patient Time In [address] [place] [address]  Time Out 0911 Patient Seen For: AM  
Mental Status Neurologic State Alert Cognition Memory loss;Decreased attention/concentration;Decreased command following Perseveration Perseverates during conversation Safety/Judgement Decreased insight into deficits;Home safety; Fall prevention 10/09/19 1011 Neuro-Linguistic Exercises Verbal Reasoning Tasks Impaired; stating a request to meet a need/want given a basic/familiar situation 3/10 independently with additional time 5/10 with maximum verbal/visual cues Verbal Problem Solving Impaired Memory Impaired; decreased recall of recent events Attention  Impaired; easily loses focus of task 10/09/19 1013 Auditory Comprehension Auditory Impairment Yes Response to Basic Yes/No Questions (%) 75 % (context based) Two-Step Basic Commands (%) 50 % identifying two body parts/items within the room Improved to 7/10 with repetitions provided Patient participated with basic cognitive-linguistic therapy. Stating a request to meet a need/want given a basic familiar situation completed 3/10 independently with additional time and required MaxA to problem solve through additional requests. 75% accuracy answering yes/no questions related to orientation and context based questions. Following basic 2 step directions completed with 50% accuracy; increased to 70% with multiple repetitions provided. Patient continues to demonstrate impaired auditory processing, problem solving, and immediate/working memory with close supervision for safety required at discharge., Jasvir Rivera MS, CCC-SLP

## 2019-10-09 NOTE — PROGRESS NOTES
[facility] PROGRESS NOTE Scot Chavez Admit Date: 9/30/2019 Admit Diagnosis: Seizure disorder ([facility] [address] [D97.999]; Weakness [R53.1]; Encephalopathy [G93.40];CAD (coronary artery disease) [I25.10]; History of CVA (cerebrovascular accident) [Z86.73]; Impaired functional mobility, balance, gait, and endurance [Z74.09] Subjective  
 
Vss. Afebrile. Feels well. Denies headache, chest pain, SOB. still is incontinent, chronic problem. Therapy tolerated well this morning. No new barriers. Objective:  
 
Current Facility-Administered Medications Medication Dose Route Frequency  NIFEdipine ER (PROCARDIA XL) tablet 60 mg  60 mg Oral BID  lisinopril (PRINIVIL, ZESTRIL) tablet 40 mg  40 mg Oral BID  hydroCHLOROthiazide (HYDRODIURIL) tablet 25 mg  25 mg Oral DAILY  acetaminophen (TYLENOL) tablet 650 mg  650 mg Oral Q4H PRN  
 aspirin chewable tablet 81 mg  81 mg Oral DAILY  bisacodyl (DULCOLAX) tablet 5 mg  5 mg Oral DAILY PRN  
 sodium chloride (NS) flush 5-40 mL  5-40 mL IntraVENous PRN  
 heparin (porcine) injection 5,000 Units  5,000 Units SubCUTAneous Q8H  
 donepezil (ARICEPT) tablet 5 mg  5 mg Oral DAILY  levETIRAcetam (KEPPRA) tablet 1,000 mg  1,000 mg Oral BID  magnesium oxide (MAG-OX) tablet 400 mg  400 mg Oral TID  PARoxetine CR (PAXIL-CR) tablet 25 mg (Patient Supplied)  25 mg Oral DAILY  phenytoin ER (DILANTIN ER) ER capsule 200 mg  200 mg Oral BID  traZODone (DESYREL) tablet 25 mg  25 mg Oral QHS PRN  
 guaiFENesin (ROBITUSSIN) 100 mg/5 mL oral liquid 100 mg  100 mg Oral Q6H PRN  
 atorvastatin (LIPITOR) tablet 40 mg  40 mg Oral QHS Review of Systems: Denies chest pain, shortness of breath, cough, headache, visual problems, abdominal pain, dysurea, calf pain. Pertinent positives are as noted in the medical records and unremarkable otherwise. Visit Vitals /71 Pulse 62 Temp 98.1 °F (36.7 °C) Resp 16 SpO2 93% Physical Exam:  
     
 General:   Alert. No acute distress. Has hearing aids. HEENT:  Normocephalic, EOMI, facial symmetry  Intact. Oral mucosa pink and moist. No JVD. Lungs:  CTA Bilaterally,Respiration even and unlabored No apparent use of accessory muscles for respiration. Heart:  Regular rate and rhythm, S1, S2, No obvious murmur, click, rub or gallop. Genitourinary: defered Abdomen:  Soft, non-tender to palpation in all four quadrants. Neuromuscular:  PERRL, EOMI 
+ mild generalized weakness BUE, BLE major muscle groups. .  
Sensory - intact grossly Plantars - down going 
   
Skin:  calf NT. Edema: none Functional Assessment: 
 
Balance Sitting - Static: Good (unsupported) (10/07/19 1300) Sitting - Dynamic: Fair (occasional) (10/07/19 1300) Standing - Static: Fair (10/07/19 1300) Standing - Dynamic : Impaired (10/07/19 1300) [facility] Fall Risk Assessment: 
Juwan Trans Risk Mobility: Ambulates or transfers with assist devices or assistance (10/09/19 0756) Mobility Interventions: Utilize walker, cane, or other assistive device (10/09/19 0756) Mentation: Periodic confusion (10/09/19 0756) Mentation Interventions: Bed/chair exit alarm; Door open when patient unattended (10/09/19 0756) Medication: Patient receiving anticonvulsants, sedatives(tranquilizers), psychotropics or hypnotics, hypoglycemics, narcotics, sleep aids, antihypertensives, laxatives, or diuretics (10/09/19 0756) Medication Interventions: Bed/chair exit alarm; Patient to call before getting OOB; Teach patient to arise slowly (10/09/19 0756) Elimination: Incontinence (10/09/19 0756) Elimination Interventions: Bed/chair exit alarm;Call light in reach; Patient to call for help with toileting needs (10/09/19 0756) Prior Fall History: Before admission in past 12 months _home or previous inpatient care) (10/09/19 0756) History of Falls Interventions: Bed/chair exit alarm; Door open when patient unattended (10/09/19 0756) Total Score: 5 (10/09/19 0756) Standard Fall Precautions: Yes (10/04/19 0051) High Fall Risk: Yes (10/09/19 0756) Speech Assessment: 
Aspiration Signs/Symptoms: None (10/01/19 1103) Ambulation: 
Gait Distance (ft): 200 Feet (ft) (10/08/19 1619) Assistive Device: Walker, rolling (10/08/19 1619) Rail Use: Both (10/07/19 1300) Labs/Studies: 
Recent Results (from the past 72 hour(s)) MAGNESIUM Collection Time: 10/07/19  7:03 AM  
Result Value Ref Range Magnesium 2.3 1.8 - 2.4 mg/dL PHENYTOIN Collection Time: 10/07/19  7:03 AM  
Result Value Ref Range Phenytoin 5.9 (L) 10 - 20 ug/mL METABOLIC PANEL, BASIC Collection Time: 10/09/19  6:27 AM  
Result Value Ref Range Sodium 143 136 - 145 mmol/L Potassium 3.6 3.5 - 5.1 mmol/L Chloride 106 98 - 107 mmol/L  
 CO2 31 21 - 32 mmol/L Anion gap 6 (L) 7 - 16 mmol/L Glucose 93 65 - 100 mg/dL BUN 23 8 - 23 MG/DL Creatinine 0.90 0.8 - 1.5 MG/DL  
 GFR est AA >60 >60 ml/min/1.73m2 GFR est non-AA >60 >60 ml/min/1.73m2 Calcium 9.1 8.3 - 10.4 MG/DL  
CBC W/O DIFF Collection Time: 10/09/19  6:27 AM  
Result Value Ref Range WBC 7.2 4.3 - 11.1 K/uL  
 RBC 4.65 4.23 - 5.6 M/uL  
 HGB 12.7 (L) 13.6 - 17.2 g/dL HCT 40.7 (L) 41.1 - 50.3 % MCV 87.5 79.6 - 97.8 FL  
 MCH 27.3 26.1 - 32.9 PG  
 MCHC 31.2 (L) 31.4 - 35.0 g/dL  
 RDW 15.1 (H) 11.9 - 14.6 % PLATELET 836 732 - 496 K/uL MPV 9.0 (L) 9.4 - 12.3 FL ABSOLUTE NRBC 0.00 0.0 - 0.2 K/uL Assessment:  
 
Problem List as of 10/9/2019 Date Reviewed: 10/1/2019 Codes Class Noted - Resolved Weakness ICD-10-CM: R53.1 ICD-9-CM: 780.79  10/1/2019 - Present Encephalopathy ICD-10-CM: G93.40 ICD-9-CM: 348.30  10/1/2019 - Present Seizure disorder ([facility] [address] ICD-10-CM: G40.909 ICD-9-CM: 345.90  10/1/2019 - Present Impaired functional mobility, balance, gait, and endurance ICD-10-CM: Z74.09 
ICD-9-CM: V49.89  10/1/2019 - Present Status epilepticus ([facility] [place] ICD-10-CM: Conchetta Yesenia ICD-9-CM: 345.3  9/28/2019 - Present  
   
 TIA (transient ischemic attack) ICD-10-CM: G45.9 ICD-9-CM: 435.9  9/26/2019 - Present Seizures ([facility] [address] ICD-10-CM: R56.9 ICD-9-CM: 780.39  9/26/2019 - Present History of CVA (cerebrovascular accident) ICD-10-CM: Z86.73 
ICD-9-CM: V12.54  9/26/2019 - Present HTN (hypertension) ICD-10-CM: I10 
ICD-9-CM: 401.9  9/26/2019 - Present HLD (hyperlipidemia) ICD-10-CM: E78.5 ICD-9-CM: 272.4  9/26/2019 - Present CAD (coronary artery disease) ICD-10-CM: I25.10 ICD-9-CM: 414.00  9/26/2019 - Present History of prostatectomy ICD-10-CM: Z90.79 ICD-9-CM: V45.89  9/26/2019 - Present History of prostate cancer ICD-10-CM: Z85.46 
ICD-9-CM: V10.46  9/26/2019 - Present Plan:  
 
Rehabilitation Plan The patient has shown the ability to tolerate and benefit from 3 hours of therapy daily and is being admitted to a comprehensive acute inpatient rehabilitation program consisting of at least 3 hours of combined physical and occupational therapies. Continue intensive Physical Therapy for a minimum of 1.5 hours a day, at least 5 out of 7 days per week to address bed mobility, transfers, ambulation, strengthening, balance, and endurance. continue intensive Occupational Therapy for a minimum of 1.5 hours a day, at least 5 out of 7 days per week to address ADL ( bathing, LE dressing, toileting) and adaptive equipment as needed. - weakness BLE, imapired balance major barriers. Becomes easily fatigued. - 10/7 per PTgait pattern -step through ataxic gait pattern with fluctuating width of base of support, decreased step length and step clearance ST - cognitive assessment, post ictal confusion.  deficits include impaired reasoning, attention, expressive language, auditory processing for following directions, and immediate and short-term memory per ST. 
- minimal cognitive clearance.  
  
The patient will also require 24-hour skilled rehabilitation nursing for bowel and bladder management, skin care for decubitus ulcer prevention , pain management and ongoing medication administration  
  
  
Continue daily physician medical management: 
Seizures/ TIA/ encephalopathy 
- keppra 
- added Phenytoin ER. Will measure level in 1 week. - 10/1 monitor. Seizure precautions. - 10/2 - continue Keppra, dilantin. 10/3 - no report of new seizures. 10/4 check dilantin level Monday. No new seizures noted. 10/7 - dilantin sub therapeutic. However will keep current dose. Recheck on Friday. 10/8 - no new seizures. Continue dilantin, keppra at current dose. 10/9 - no notable ictal events.  
  
History of CVA (cerebrovascular accident) (9/26/2019) 
- left LE weakness 
- lipitor 
- aspirin 81 
- aricept - prior R frontal hemorrahagic CVA. 10/3 - stable neuro exam. No new notable focal weakness. 10/4 - stronger, funcitonally improving daily. 10/7 - stable neuro exam. No regression. 10/9 -  
 no new neurologic setbacks. + still cognitive deifits 
  
UTI - + pseudomonas, sensitive to cipro, start po cipro x 5 days. - 10/8 -Treatment finished. Hypertension - BP uncontrolled, fluctuating, managed medically. - continue lisinopril, procardia XL 
- 10/2 - -180s. add norvasc 5. 
10/3 - SBP 130s. Monitor. 10/4 - continue norvasc 5. Generally mildly improved BP. Monitor over the weekend. 10/8 - BP in fair control. Mild fluctuations. May need to increase norvasc. HLD (hyperlipidemia) (9/26/2019) 
- lipitor 
  
CAD (coronary artery disease) - cardiac precaution.  
  
Pneumonia prophylaxis- Insentive spirometer every hour while awake 
  
DVT risk / DVT Prophylaxis-   
- SCDs. History of prostatectomy/ History of prostate cancer (9/26/2019)/ Urinary retention/ neurogenic bladder --  
scheulde voids q2h. As pt incontinent. - Check post-void residual every shift; In and Out catheterize if post-void residual is more than 400 cc. 
 
 
bowel program - incontinent 
  
GERD - resume PPI. At times may need additional antacids, Maalox prn. 
  
  
 
Time spent was 25 minutes with over 1/2 in direct patient care/examination, consultation and coordination of care. Signed By: Mary Ellen Collins MD   
 October 9, 2019

## 2019-10-09 NOTE — PROGRESS NOTES
OT Daily Note Time In 1110 Time Out 1157 Pain: Patient had no complaint of pain. Functional Mobility Score Comments Sit to Stand 4: Supervision or touching A Occasional CGA Transfer Assist 4: Supervision or touching A Transfer Type: SPT Equipment: Lattie Shirts Comments: Occasional CGA Pt demonstrated poor safety awareness with all transfers as demonstrated by not backing up to chair. Cognition Try to engage in conversation, but pt unable to answer questions functionally. Pt would just go back to common phrases to make conversation, such as it is nice outside. Engaged in attention activity of putting coins in a slot. Pt was able to name different coins and their value. Pt required mod redirection to stay on task. Engaged in fishing. Pt was unable to manipulate fishing pole to capture fish, but was able to take them off the line and place in a bag with mod cueing initially, but then reduced to min cueing. Education Safety with transfers Interdisciplinary Communication: COLE Polanco on pt's performance Plan: Continue with POC. Pt was left in recliner with alarm on with all needs within reach. Sonya Panchal OTR/L 
10/9/2019

## 2019-10-09 NOTE — PROGRESS NOTES
[facility] PROGRESS NOTE Joseph Mcdonough Admit Date: 9/30/2019 Admit Diagnosis: Seizure disorder ([facility] [address] [facility] [U34.007]; Weakness [R53.1]; Encephalopathy [G93.40];CAD (coronary artery disease) [I25.10]; History of CVA (cerebrovascular accident) [Z86.73]; Impaired functional mobility, balance, gait, and endurance [Z74.09] Subjective  
 
Vss. Afebrile. No new neurologic setbacks. No seizures reported. case discussed in team conference. Still reported to have cognitive deficits, likley below his baseline. Patient still impulsive, with fall risk, unsafe for ambulation independently. Objective:  
 
Current Facility-Administered Medications Medication Dose Route Frequency  NIFEdipine ER (PROCARDIA XL) tablet 60 mg  60 mg Oral BID  lisinopril (PRINIVIL, ZESTRIL) tablet 40 mg  40 mg Oral BID  hydroCHLOROthiazide (HYDRODIURIL) tablet 25 mg  25 mg Oral DAILY  acetaminophen (TYLENOL) tablet 650 mg  650 mg Oral Q4H PRN  
 aspirin chewable tablet 81 mg  81 mg Oral DAILY  bisacodyl (DULCOLAX) tablet 5 mg  5 mg Oral DAILY PRN  
 sodium chloride (NS) flush 5-40 mL  5-40 mL IntraVENous PRN  
 heparin (porcine) injection 5,000 Units  5,000 Units SubCUTAneous Q8H  
 donepezil (ARICEPT) tablet 5 mg  5 mg Oral DAILY  levETIRAcetam (KEPPRA) tablet 1,000 mg  1,000 mg Oral BID  magnesium oxide (MAG-OX) tablet 400 mg  400 mg Oral TID  PARoxetine CR (PAXIL-CR) tablet 25 mg (Patient Supplied)  25 mg Oral DAILY  phenytoin ER (DILANTIN ER) ER capsule 200 mg  200 mg Oral BID  traZODone (DESYREL) tablet 25 mg  25 mg Oral QHS PRN  
 guaiFENesin (ROBITUSSIN) 100 mg/5 mL oral liquid 100 mg  100 mg Oral Q6H PRN  
 atorvastatin (LIPITOR) tablet 40 mg  40 mg Oral QHS Review of Systems:Denies chest pain, shortness of breath, cough, headache, visual problems, abdominal pain, dysurea, calf pain. Pertinent positives are as noted in the medical records and unremarkable otherwise. Visit Vitals /71 Pulse 62 Temp 98.1 °F (36.7 °C) Resp 16 SpO2 93% Physical Exam:  
     
General:   Alert. No acute distress. Has hearing aids. HEENT:  Normocephalic, EOMI, facial symmetry  Intact. Oral mucosa pink and moist. No JVD. Lungs:  CTA Bilaterally,Respiration even and unlabored No apparent use of accessory muscles for respiration. Heart:  Regular rate and rhythm, S1, S2, No obvious murmur, click, rub or gallop. Genitourinary: defered Abdomen:  Soft, non-tender to palpation in all four quadrants. Neuromuscular:  PERRL, EOMI 
LUE     Shoulder abduction  4+ /5 Elbow flexion:   5-/5 Wrist extension:   5-/5 Finger flexion;   5-/5 RUE    Shoulder abduction:5-  /5 Elbow flexion: 5  /5 Wrist extension: 5/5 Finger flexion: 5  /5 LLE     Hip flexion:  4/5 Knee extension:  4+ /5 Ankle dorsiflexion:  4+ /5 Ankle plantarflexion:  5-/5 RLE     Hip flexion:  5- /5 Knee extension:  4+ /5 Ankle dorsiflexion: 5-  /5 Ankle plantarflexion:   5-/5 Sensory - intact Plantars - down going 
   
Skin:  Intact, dry, good skin turgor, age related changes present Edema: none Functional Assessment: 
 
Balance Sitting - Static: Good (unsupported) (10/07/19 1300) Sitting - Dynamic: Fair (occasional) (10/07/19 1300) Standing - Static: Fair (10/07/19 1300) Standing - Dynamic : Impaired (10/07/19 1300) Donata Carrel Fall Risk Assessment: 
Colon Duke Risk Mobility: Ambulates or transfers with assist devices or assistance (10/09/19 0756) Mobility Interventions: Utilize walker, cane, or other assistive device (10/09/19 0756) Mentation: Periodic confusion (10/09/19 0756) Mentation Interventions: Bed/chair exit alarm; Door open when patient unattended (10/09/19 0756) Medication: Patient receiving anticonvulsants, sedatives(tranquilizers), psychotropics or hypnotics, hypoglycemics, narcotics, sleep aids, antihypertensives, laxatives, or diuretics (10/09/19 0756) Medication Interventions: Bed/chair exit alarm; Patient to call before getting OOB; Teach patient to arise slowly (10/09/19 0756) Elimination: Incontinence (10/09/19 0756) Elimination Interventions: Bed/chair exit alarm;Call light in reach; Patient to call for help with toileting needs (10/09/19 0756) Prior Fall History: Before admission in past 12 months _home or previous inpatient care) (10/09/19 0756) History of Falls Interventions: Bed/chair exit alarm; Door open when patient unattended (10/09/19 0756) Total Score: 5 (10/09/19 0756) Standard Fall Precautions: Yes (10/04/19 0051) High Fall Risk: Yes (10/09/19 0756) Speech Assessment: 
Aspiration Signs/Symptoms: None (10/01/19 1103) Ambulation: 
Gait Distance (ft): 200 Feet (ft) (10/08/19 1619) Assistive Device: Walker, rolling (10/08/19 1619) Rail Use: Both (10/07/19 1300) Labs/Studies: 
Recent Results (from the past 72 hour(s)) MAGNESIUM Collection Time: 10/07/19  7:03 AM  
Result Value Ref Range Magnesium 2.3 1.8 - 2.4 mg/dL PHENYTOIN Collection Time: 10/07/19  7:03 AM  
Result Value Ref Range Phenytoin 5.9 (L) 10 - 20 ug/mL METABOLIC PANEL, BASIC Collection Time: 10/09/19  6:27 AM  
Result Value Ref Range Sodium 143 136 - 145 mmol/L Potassium 3.6 3.5 - 5.1 mmol/L Chloride 106 98 - 107 mmol/L  
 CO2 31 21 - 32 mmol/L Anion gap 6 (L) 7 - 16 mmol/L Glucose 93 65 - 100 mg/dL BUN 23 8 - 23 MG/DL Creatinine 0.90 0.8 - 1.5 MG/DL  
 GFR est AA >60 >60 ml/min/1.73m2 GFR est non-AA >60 >60 ml/min/1.73m2 Calcium 9.1 8.3 - 10.4 MG/DL  
CBC W/O DIFF Collection Time: 10/09/19  6:27 AM  
Result Value Ref Range WBC 7.2 4.3 - 11.1 K/uL  
 RBC 4.65 4.23 - 5.6 M/uL HGB 12.7 (L) 13.6 - 17.2 g/dL HCT 40.7 (L) 41.1 - 50.3 % MCV 87.5 79.6 - 97.8 FL  
 MCH 27.3 26.1 - 32.9 PG  
 MCHC 31.2 (L) 31.4 - 35.0 g/dL  
 RDW 15.1 (H) 11.9 - 14.6 % PLATELET 461 797 - 803 K/uL MPV 9.0 (L) 9.4 - 12.3 FL ABSOLUTE NRBC 0.00 0.0 - 0.2 K/uL Assessment:  
 
Problem List as of 10/9/2019 Date Reviewed: 10/1/2019 Codes Class Noted - Resolved Weakness ICD-10-CM: R53.1 ICD-9-CM: 780.79  10/1/2019 - Present Encephalopathy ICD-10-CM: G93.40 ICD-9-CM: 348.30  10/1/2019 - Present Seizure disorder ([facility] ICD-10-CM: G40.909 ICD-9-CM: 345.90  10/1/2019 - Present Impaired functional mobility, balance, gait, and endurance ICD-10-CM: Z74.09 
ICD-9-CM: V49.89  10/1/2019 - Present Status epilepticus ([facility] ICD-10-CM: Aldona Ruiz ICD-9-CM: 345.3  9/28/2019 - Present  
   
 TIA (transient ischemic attack) ICD-10-CM: G45.9 ICD-9-CM: 435.9  9/26/2019 - Present Seizures ([facility] ICD-10-CM: R56.9 ICD-9-CM: 780.39  9/26/2019 - Present History of CVA (cerebrovascular accident) ICD-10-CM: Z86.73 
ICD-9-CM: V12.54  9/26/2019 - Present HTN (hypertension) ICD-10-CM: I10 
ICD-9-CM: 401.9  9/26/2019 - Present HLD (hyperlipidemia) ICD-10-CM: E78.5 ICD-9-CM: 272.4  9/26/2019 - Present CAD (coronary artery disease) ICD-10-CM: I25.10 ICD-9-CM: 414.00  9/26/2019 - Present History of prostatectomy ICD-10-CM: Z90.79 ICD-9-CM: V45.89  9/26/2019 - Present History of prostate cancer ICD-10-CM: Z85.46 
ICD-9-CM: V10.46  9/26/2019 - Present Plan:  
 
Rehabilitation Plan The patient has shown the ability to tolerate and benefit from 3 hours of therapy daily and is being admitted to a comprehensive acute inpatient rehabilitation program consisting of at least 3 hours of combined physical and occupational therapies.  
Continue intensive Physical Therapy for a minimum of 1.5 hours a day, at least 5 out of 7 days per week to address bed mobility, transfers, ambulation, strengthening, balance, and endurance. continue intensive Occupational Therapy for a minimum of 1.5 hours a day, at least 5 out of 7 days per week to address ADL ( bathing, LE dressing, toileting) and adaptive equipment as needed. - weakness BLE, imapired balance major barriers. Becomes easily fatigued. - 10/7 per PTgait pattern -step through ataxic gait pattern with fluctuating width of base of support, decreased step length and step clearance ST - cognitive assessment, post ictal confusion. deficits include impaired reasoning, attention, expressive language, auditory processing for following directions, and immediate and short-term memory per ST. 
- minimal cognitive clearance.  
  
The patient will also require 24-hour skilled rehabilitation nursing for bowel and bladder management, skin care for decubitus ulcer prevention , pain management and ongoing medication administration  
  
  
Continue daily physician medical management: 
Seizures/ TIA/ encephalopathy 
- keppra 
- added Phenytoin ER. Will measure level in 1 week. - 10/1 monitor. Seizure precautions. - 10/2 - continue Keppra, dilantin. 10/3 - no report of new seizures. 10/4 check dilantin level Monday. No new seizures noted. 10/7 - dilantin sub therapeutic. However will keep current dose. Recheck on Friday. 10/8 - no new seizures. Continue dilantin, keppra at current dose.  
  
History of CVA (cerebrovascular accident) (9/26/2019) 
- left LE weakness 
- lipitor 
- aspirin 81 
- aricept - prior R frontal hemorrahagic CVA. 10/3 - stable neuro exam. No new notable focal weakness. 10/4 - stronger, funcitonally improving daily. 10/7 - stable neuro exam. No regression.  
  
  
UTI - + pseudomonas, sensitive to cipro, start po cipro x 5 days. - 10/8 -Treatment finished. Hypertension - BP uncontrolled, fluctuating, managed medically. - continue lisinopril, procardia XL 
- 10/2 - -180s. add norvasc 5. 
10/3 - SBP 130s. Monitor. 10/4 - continue norvasc 5. Generally mildly improved BP. Monitor over the weekend. 10/8 - BP in fair control. Mild fluctuations. May need to increase norvasc. HLD (hyperlipidemia) (9/26/2019) 
- lipitor 
  
CAD (coronary artery disease) - cardiac precaution.  
  
Pneumonia prophylaxis- Insentive spirometer every hour while awake 
  
DVT risk / DVT Prophylaxis-   
- SCDs. History of prostatectomy/ History of prostate cancer (9/26/2019)/ Urinary retention/ neurogenic bladder --  
scheulde voids q2h. As pt incontinent. - Check post-void residual every shift; In and Out catheterize if post-void residual is more than 400 cc. 
 
 
bowel program - incontinent 
  
GERD - resume PPI. At times may need additional antacids, Maalox prn. 
  
  
 
Time spent was 25 minutes with over 1/2 in direct patient care/examination, consultation and coordination of care. Signed By: Arvind Oneil MD   
 October 9, 2019

## 2019-10-10 NOTE — PROGRESS NOTES
OT Daily Note Time In 0920 Time Out 1000 Subjective: Thanks for working with me Pain: not indicated during session. Interdisciplinary Communication: with OT, Rachel Rosenbergy about goals and tx plan. Precautions: Other (comment), Seizure(fall risk, impaired cognition ) Balance/functional mobility Daily Assessment RW from recliner to w/c with CGA. Cognition/attention Daily Assessment Worked on attention, sequencing, scanning, and problem solving using a modified h MN supination board with blocks numbered 1 -60. Patient did this once before and only got to 30, this session able to get to 46 in less than 30 minutes. Ended session:in recliner, in room, posey alarm on. Louis Leon OT  
10/10/2019

## 2019-10-10 NOTE — PROGRESS NOTES
10/10/19 1035 Time Spent With Patient Time In 0273 Time Out 0915 Patient Seen For: AM  
Mental Status Neurologic State Alert Cognition Impulsive;Decreased attention/concentration;Decreased command following Perseveration Perseverates during conversation Safety/Judgement Home safety; Fall prevention;Decreased insight into deficits 10/10/19 1038 Verbal Expression Naming Impaired; numerous perseverations Exercise/Activities Tolerance Exercise Tolerance/Response Cueing required (amount); Easily distracted 10/10/19 1040 Memory Exercises Functional Tasks recalling activities completed the beginning of the session in a field of 2 choices 4/5 Attention Exercises Performed Accuracy (%) 90 % (basic sorting task) Neuro-Linguistic Exercises Verbal Problem Solving Impaired; making a request to meet a want/need required MaxA Memory Impaired Attention  Impaired Patient participated with basic cognitive therapy. Patient's wife was present. Re-iterated need for 24 hr supervision at discharge and discussed cognitive areas being addressed during therapy including impaired processing, problem solving, immediate/working memory, impulsivity, and sustained attention to task. Patient's wife reports that he loved to play cards and would frequently play with friends in [place]. Patient named the suits appropriately and sorted into four piles with SBA. Answered yes/no questions related to immediate surroundings with 80% accuracy. Stating a request to meet a basic need required MaxA. Patient perseverated on the names of friends he played cards with throughout the session with frequent re-direction to task required. Patient was able to identify task/discussion he engaged in earlier in the session 4/5 when presented in multiple choice format with a field of 2 given repetitions and additional time. Will continue to follow for cognitive therapy.  
 
Cassandra Mora MS, CCC-SLP

## 2019-10-10 NOTE — PROGRESS NOTES
Time In 0705 Time Out 8824 Occupational Therapy Daily Note Mobility Score Comments Rolling 5: S/U or clean-up assist Side: Left Using bedrail, maximum encouragement Supine to Sit 5: S/U or clean-up assist Maximum verbal encouragement Sit to Supine NA Transfer Assist 4: Supervision or touching A Transfer Type: SPT Equipment: Rolling Walker and tub transfer bench Comments: maximum encouragement; persists Activities of Daily Living Score Comments Eating 6: Independent Oral Hygiene NA Patient was eating breakfast when ending session Bathing 4: Supervision or touching A Type of Shower: Shower Position: Standing PRN and Unsupported Sitting Adaptive  Equipment: Tub Transfer Bench and Margean Albanian Comments: maximum verbal cueing for thoroughness and safety Upper Body Dressing 5: S/U or clean-up assist Items Applied: Pullover Position: Unsupported Sitting Comments: setup assist   
Lower Body Dressing 4: Supervision or touching A Items Applied: Underwear and Elastic pants Position: Standing PRN and Unsupported Sitting Adaptive Equipment: RW 
Comments: close supervision for safety Donning/Frederickson Footwear 5: S/U or clean-up assist Items Applied: Socks and Slip-on shoes Adaptive Equipment: N/A Comments: verbal cueing, setup assist   
Toilet Transfer 4: Supervision or touching A Transfer Type: SPT Equipment: Genny Yas Comments: steadying assist, maximum verbal cueing Toileting Hygiene 5: S/U or clean-up assist Output: urine only x 2 Comments:   
Education  Wife present for duration of ADL; completed modified family training discussing cognition, functional status, and stressed the need for 24/7 supervision Objective: Patient presented supine in bed. Very difficult to get patient to participate in ADL this session, requiring 20 minutes of encouragement to get up to complete ADL.  Patient's wife present; completed modified family training as noted above. Wife appeared overwhelmed with patient's decline in cognition and the need for 24/7 assistance and close supervision. Patient completed ADL; see above for details. Assessment: Patient's cognition remains primary barrier. Continue to anticipate patient will require 24/7 supervision. Patient appears to be approaching his new functional baseline due to ongoing cognition deficits. Plan: Continue OT POC with focus on ADL/IADL skills, functional transfers, functional mobility, cognition, coordination, strength, static and dynamic balance, and activity tolerance to maximize safety and independence with ADLs and functional transfers. Patient tolerated session well with no complaint of pain and was left seated up in recliner with call light/all needs in reach and in no distress. Chair alarm activated. Wife present. Soco Armstrong MS, OTR/L 
10/10/2019

## 2019-10-10 NOTE — PROGRESS NOTES
PHYSICAL THERAPY DAILY NOTE Time In: 5356 Time Out: 2539 Patient Seen For: PM;Gait training; Other (see progress notes); Therapeutic exercise;Transfer training Subjective: Patient had no complaints. Objective: No pain noted. Seizure, Other (comment)(Fall precautions, ) GROSS ASSESSMENT Daily Assessment COGNITION Daily Assessment BED/MAT MOBILITY Daily Assessment Supine to Sit : 5 (Supervision) Sit to Supine : 5 (Supervision) TRANSFERS Daily Assessment Transfer Type: SPT without device Transfer Assistance : 4 (Minimal assistance) Sit to Stand Assistance: Stand-by assistance Car Transfers: Not tested GAIT Daily Assessment Amount of Assistance: 3 (Moderate assistance) Distance (ft): 200 Feet (ft) Assistive Device: Walker, rolling STEPS or STAIRS Daily Assessment Level of Assist : 0 (Not tested) BALANCE Daily Assessment WHEELCHAIR MOBILITY Daily Assessment LOWER EXTREMITY EXERCISES Daily Assessment Extremity: Both Exercise Type #1: Other (comment)(nustep x 10 minutes) Sets Performed: 1 Reps Performed: 10 Level of Assist: Supervision Assessment: Patient making good progress. Plan of Care: Continue with plan of care. Katrina Gresham, COLE 
10/10/2019

## 2019-10-10 NOTE — PROGRESS NOTES
PHYSICAL THERAPY DAILY NOTE Time In: 1116 Time Out: 1201 Patient Seen For: AM;Gait training; Other (see progress notes); Therapeutic exercise;Transfer training Subjective: Patient had no complaints. Objective: NO pain noted. Seizure, Other (comment)(Fall precautions, ) GROSS ASSESSMENT Daily Assessment COGNITION Daily Assessment  
 confused BED/MAT MOBILITY Daily Assessment Supine to Sit : 5 (Supervision) Sit to Supine : 5 (Supervision) TRANSFERS Daily Assessment Transfer Type: SPT without device Transfer Assistance : 4 (Minimal assistance) Sit to Stand Assistance: Stand-by assistance Car Transfers: Not tested GAIT Daily Assessment Amount of Assistance: 3 (Moderate assistance) Distance (ft): 200 Feet (ft) Assistive Device: Walker, rolling STEPS or STAIRS Daily Assessment Level of Assist : 0 (Not tested) BALANCE Daily Assessment WHEELCHAIR MOBILITY Daily Assessment LOWER EXTREMITY EXERCISES Daily Assessment Extremity: Both Exercise Type #1: Seated lower extremity strengthening Sets Performed: 1 Reps Performed: 20 Level of Assist: Minimal assistance Assessment: Patient will need 24 hour assistance at discharge. Plan of Care: Continue with plan of care. Rio Nielsen, COLE 
10/10/2019

## 2019-10-11 NOTE — PROGRESS NOTES
Problem: Falls - Risk of 
Goal: *Absence of Falls Description Document Asha Rodríguezs Fall Risk and appropriate interventions in the flowsheet. Outcome: Progressing Towards Goal 
Note:  
Fall Risk Interventions: 
Mobility Interventions: Communicate number of staff needed for ambulation/transfer Mentation Interventions: Adequate sleep, hydration, pain control Medication Interventions: Bed/chair exit alarm Elimination Interventions: Bed/chair exit alarm History of Falls Interventions: Bed/chair exit alarm

## 2019-10-11 NOTE — PROGRESS NOTES
OT Daily Note Time In 0915 Time Out 1000 Subjective: Patient agreeable for therapy. Patient's wife present Pain: none Education: Family education on patient's cognition with tasks, and also instructed wife to give verbal cues with walker safety with functional tasks. Interdisciplinary Communication: PT 
Precautions: fall Mobility Score Comments Sit to Stand 4: Supervision or touching A V/c's for technique Transfer Assist 4: Supervision or touching A Transfer Type: SPT Equipment: Annye Ana Comments: steadying assist w verbal cues for technique Strengthening Patient propelled w/c to gym with consistent verbal cues to not veer to left, working on visual field, visual motor, B UE coordination, and B UE strength/endurance. Esme David Activity tolerance/Cognition Patient performed cognitive activity, naming items on flash cards with 85% accuracy, and placing the card in correct category with maximal verbal cues to initiate and follow through with task. The more practice patient had, the greater accuracy he had with placing card in correct store with Min to [facility] Cues varying according to item. Educated patient's wife on patient's cognitive status, and recommended cognitive activities to do at home. Assessment: Patient slowly, steadily progressing with cognitive tasks. Patient remains impulsive with poor safety, and needs 24 hour supervision. Plan: Cont OT per tx plan 
 
Kim Menard OT  
10/11/2019

## 2019-10-11 NOTE — PROGRESS NOTES
Spoke with patient wife, tearful. Concerned on his discharge on weather she will be able to care for him. Spoke to her about hired caregivers, gave her the services ordered for home health. Gave her the list of STR for after she goes home and its too difficult for her. Social work ordered. Encouragement given and active listening. Wife felt more comfortable after discussion.

## 2019-10-11 NOTE — PROGRESS NOTES
PHYSICAL THERAPY DAILY NOTE Time In: 1347 Time Out: 1434 Patient Seen For: PM;Other (see progress notes);Gait training; Therapeutic exercise;Transfer training Subjective: Patient had no complaints. Objective: No pain noted. Seizure, Other (comment)(Fall precautions, ) GROSS ASSESSMENT Daily Assessment COGNITION Daily Assessment BED/MAT MOBILITY Daily Assessment Supine to Sit : 5 (Supervision) Sit to Supine : 5 (Supervision) TRANSFERS Daily Assessment Transfer Type: SPT without device Transfer Assistance : 4 (Minimal assistance) Sit to Stand Assistance: Stand-by assistance GAIT Daily Assessment Amount of Assistance: 3 (Moderate assistance) Distance (ft): 200 Feet (ft) Assistive Device: Walker, rolling STEPS or STAIRS Daily Assessment Steps/Stairs Ambulated (#): 4 Level of Assist : 0 (Not tested) Rail Use: Both  
 
 
BALANCE Daily Assessment WHEELCHAIR MOBILITY Daily Assessment Able to Propel (ft): 200 feet Functional Level: 5 LOWER EXTREMITY EXERCISES Daily Assessment Extremity: Both Exercise Type #1: Other (comment)(nustep x 10 minutes) Sets Performed: 1 Reps Performed: 10 Level of Assist: Supervision Assessment: Patient making good progress. Plan of Care: Continue with plan of care. Adama Moore, COLE 
10/11/2019

## 2019-10-11 NOTE — PROGRESS NOTES
Time In 0830 Time Out 0915 Occupational Therapy Daily Note Mobility Score Comments Rolling 6: Independent Side: Left Using bedrails Supine to Sit 6: Independent Using bedrails Sit to Supine Transfer Assist 4: Supervision or touching A Transfer Type: SPT Equipment: Rolling Walker and tub transfer bench Comments: requiring constant steadying assist   
 
Activities of Daily Living Score Comments Eating 6: Independent Oral Hygiene 4: Supervision or touching A Constant steadying assist in standing Bathing 4: Supervision or touching A Type of Shower: Shower Position: Standing PRN and Unsupported Sitting Adaptive  Equipment: Tub Transfer Bench and Stana Raw Comments: verbal and visual cueing for thoroughness. Constant steadying assist in standing. Upper Body Dressing 5: S/U or clean-up assist Items Applied: Pullover Position: Unsupported Sitting Comments:   
Lower Body Dressing 4: Supervision or touching A Items Applied: Underwear and Elastic pants Position: Standing PRN and Unsupported Sitting Adaptive Equipment: N/A Comments: steadying assist when standing for clothing management up Donning/[place] Footwear 5: S/U or clean-up assist Items Applied: Socks and Slip-on shoes Adaptive Equipment: N/A Comments: setup Toilet Transfer 4: Supervision or touching A Transfer Type: SPT Equipment: Amishfede Dominguez Comments: constant steadying assist, multiple LOB requiring min A to correct. Remains very unsafe with RW. Toileting Hygiene 5: S/U or clean-up assist Output: urine only Comments:   
Education  Wife present for family training.  Educated wife on need for 24/7 supervision, need for constant CGA during any functional mobility including static standing, ongoing cognitive and safety deficits, environmental modifications for safety, completing showers seated, installation of grab bars, use of BSC both at night and over toilet (if possible), and bed alarms. Verbalized understanding of all education but still appears overwhelmed. Will consult with  regarding providing wife with resources for in home assistance. Will order [facility] for safety with toileting, as wife reports they now have a toilet in a \"toilet closet\" and a RW may not fit in to access toilet. Objective: Patient presented long sitting in bed. Patient completed ADL; see above for details. Completed family training; see above for details. Patient remains very unsafe with RW during functional mobility. Assessment: Wife appears very overwhelmed. Will benefit from discussion with  regarding in home services. Patient appears to be approaching his new functional baseline. Plan: Continue OT POC with focus on ADL/IADL skills, functional transfers, developing a routine, cognition, ongoing family training, functional mobility, coordination, strength, static and dynamic balance, and activity tolerance to maximize safety and independence with ADLs and functional transfers. Patient tolerated session well with no complaint of pain and was left seated up in [facility] in room with OT Nnamdi. Sue Crane, MS, OTR/L 
10/11/2019

## 2019-10-11 NOTE — PROGRESS NOTES
PHYSICAL THERAPY DAILY NOTE Time In: 8819 Time Out: 1046 Patient Seen For: AM;Gait training; Other (see progress notes); Therapeutic exercise;Transfer training Subjective: Patient had no complaints. Objective: No pain noted. Wife present and shown stairs , ambulation with rolling walker , and w/c propulsion. Wife tearful and Beth Smiles spoke to her about resources to assist at home. Ordered w/c for home. Seizure, Other (comment)(Fall precautions, ) GROSS ASSESSMENT Daily Assessment COGNITION Daily Assessment BED/MAT MOBILITY Daily Assessment Supine to Sit : 5 (Supervision) Sit to Supine : 5 (Supervision) TRANSFERS Daily Assessment Transfer Type: SPT without device Transfer Assistance : 4 (Minimal assistance) Sit to Stand Assistance: Stand-by assistance GAIT Daily Assessment Amount of Assistance: 3 (Moderate assistance) Distance (ft): 200 Feet (ft) Assistive Device: Walker, rolling STEPS or STAIRS Daily Assessment Steps/Stairs Ambulated (#): 4 Level of Assist : 4 (Minimal assistance) Rail Use: Both  
 
 
BALANCE Daily Assessment WHEELCHAIR MOBILITY Daily Assessment LOWER EXTREMITY EXERCISES Daily Assessment Extremity: Both Exercise Type #1: Other (comment)(nustep x 10 minutes) Sets Performed: 1 Reps Performed: 10 Level of Assist: Supervision Assessment: Patient making good progress. Plan of Care: Continue with plan of care. Adama Moore, COLE 
10/11/2019

## 2019-10-12 NOTE — PROGRESS NOTES
PHYSICAL THERAPY DAILY NOTE Time In: 1100 Time Out: 1134 Patient Seen For: AM;Patient education;Gait training; Therapeutic exercise Subjective: \"I'm doing good\" Objective: pt rates 0/10 pain before and after PT visit Seizure, Other (comment)(Fall precautions, ) GROSS ASSESSMENT Daily Assessment COGNITION Daily Assessment BED/MAT MOBILITY Daily Assessment TRANSFERS Daily Assessment Pt performs sit<>stands BUE support from w/c, standard height chair and EOM Transfer Type: SPT with walker Transfer Assistance : 4 (Contact guard assistance) Sit to Stand Assistance: Stand-by assistance GAIT Daily Assessment Additional 240ft, reciprocal steps v.c.s for for increased awareness to L side and controlled mobility, improved carryover noted, min v.c.s to keep walker close to body for safety and support Amount of Assistance: 4 (Minimal assistance) Distance (ft): 200 Feet (ft) Assistive Device: Walker, rolling STEPS or STAIRS Daily Assessment BALANCE Daily Assessment WHEELCHAIR MOBILITY Daily Assessment LOWER EXTREMITY EXERCISES Daily Assessment NU Step 10 min level 3 LE only Assessment: Pt displays mild confusion intermittent with improved awareness during gait today, education given on pacing techniques and to promote controlled mobility Plan of Care: Continue with current POC to help pt increase safety awareness for safe functional mobility Suzy Humphreys, PTA 
10/12/2019

## 2019-10-12 NOTE — PROGRESS NOTES
[facility] PROGRESS NOTE [place] Admit Date: 9/30/2019 Admit Diagnosis: Seizure disorder ([facility] [address] [U65.925]; Weakness [R53.1]; Encephalopathy [G93.40];CAD (coronary artery disease) [I25.10]; History of CVA (cerebrovascular accident) [Z86.73]; Impaired functional mobility, balance, gait, and endurance [Z74.09] Subjective  
 
Vss. Afebrile. Continues to be motivated. Pleasantly confused. Denies chest pain, SOB, cough. Objective:  
 
Current Facility-Administered Medications Medication Dose Route Frequency  NIFEdipine ER (PROCARDIA XL) tablet 60 mg  60 mg Oral BID  lisinopril (PRINIVIL, ZESTRIL) tablet 40 mg  40 mg Oral BID  hydroCHLOROthiazide (HYDRODIURIL) tablet 25 mg  25 mg Oral DAILY  acetaminophen (TYLENOL) tablet 650 mg  650 mg Oral Q4H PRN  
 aspirin chewable tablet 81 mg  81 mg Oral DAILY  bisacodyl (DULCOLAX) tablet 5 mg  5 mg Oral DAILY PRN  
 sodium chloride (NS) flush 5-40 mL  5-40 mL IntraVENous PRN  
 heparin (porcine) injection 5,000 Units  5,000 Units SubCUTAneous Q8H  
 donepezil (ARICEPT) tablet 5 mg  5 mg Oral DAILY  levETIRAcetam (KEPPRA) tablet 1,000 mg  1,000 mg Oral BID  magnesium oxide (MAG-OX) tablet 400 mg  400 mg Oral TID  PARoxetine CR (PAXIL-CR) tablet 25 mg (Patient Supplied)  25 mg Oral DAILY  phenytoin ER (DILANTIN ER) ER capsule 200 mg  200 mg Oral BID  traZODone (DESYREL) tablet 25 mg  25 mg Oral QHS PRN  
 guaiFENesin (ROBITUSSIN) 100 mg/5 mL oral liquid 100 mg  100 mg Oral Q6H PRN  
 atorvastatin (LIPITOR) tablet 40 mg  40 mg Oral QHS Review of Systems: Denies chest pain, shortness of breath, cough, headache, visual problems, abdominal pain, dysurea, calf pain. Pertinent positives are as noted in the medical records and unremarkable otherwise. Visit Vitals /65 Pulse 68 Temp 97.6 °F (36.4 °C) Resp 18 SpO2 95% Physical Exam:  
     
General:   Alert. No acute distress. Has hearing aids. HEENT:  Normocephalic, EOMI. No JVD. Lungs:  CTA no wheezing Heart:  Regular rate and rhythm, S1, S2, No obvious murmur Genitourinary: defered Abdomen:  Soft, non-tender to palpation in all four quadrants. Neuromuscular:  PERRL, EOMI 
+ mild generalized weakness BUE, BLE major muscle groups. .  
Sensory - intact grossly 
   
Skin:  calf NT. Edema: none Functional Assessment: 
 
Balance Sitting - Static: Good (unsupported) (10/07/19 1300) Sitting - Dynamic: Fair (occasional) (10/07/19 1300) Standing - Static: Fair (10/07/19 1300) Standing - Dynamic : Impaired (10/07/19 1300) Luis Daniel Bowie Fall Risk Assessment: 
Chelsi Alves Mobility: Ambulates or transfers with assist devices or assistance (10/11/19 0730) Mobility Interventions: Communicate number of staff needed for ambulation/transfer;Patient to call before getting OOB;Utilize walker, cane, or other assistive device (10/11/19 0730) Mentation: Periodic confusion (10/11/19 0730) Mentation Interventions: Adequate sleep, hydration, pain control;Bed/chair exit alarm; Family/sitter at bedside (10/11/19 0730) Medication: Patient receiving anticonvulsants, sedatives(tranquilizers), psychotropics or hypnotics, hypoglycemics, narcotics, sleep aids, antihypertensives, laxatives, or diuretics (10/11/19 0730) Medication Interventions: Assess postural VS orthostatic hypotension;Bed/chair exit alarm;Evaluate medications/consider consulting pharmacy; Patient to call before getting OOB (10/11/19 0730) Elimination: Incontinence (10/11/19 0730) Elimination Interventions: Bed/chair exit alarm;Call light in reach; Patient to call for help with toileting needs (10/11/19 0730) Prior Fall History: Before admission in past 12 months _home or previous inpatient care) (10/11/19 0730) History of Falls Interventions: Bed/chair exit alarm; Consult care management for discharge planning;Door open when patient unattended (10/11/19 0730) Total Score: 5 (10/11/19 0730) Standard Fall Precautions: Yes (10/04/19 0051) High Fall Risk: Yes (10/11/19 0730) Speech Assessment: 
Aspiration Signs/Symptoms: None (10/01/19 1103) Ambulation: 
Gait Distance (ft): 200 Feet (ft) (10/11/19 1626) Assistive Device: Walker, rolling (10/11/19 1626) Rail Use: Both (10/11/19 1245) Labs/Studies: 
Recent Results (from the past 72 hour(s)) METABOLIC PANEL, BASIC Collection Time: 10/09/19  6:27 AM  
Result Value Ref Range Sodium 143 136 - 145 mmol/L Potassium 3.6 3.5 - 5.1 mmol/L Chloride 106 98 - 107 mmol/L  
 CO2 31 21 - 32 mmol/L Anion gap 6 (L) 7 - 16 mmol/L Glucose 93 65 - 100 mg/dL BUN 23 8 - 23 MG/DL Creatinine 0.90 0.8 - 1.5 MG/DL  
 GFR est AA >60 >60 ml/min/1.73m2 GFR est non-AA >60 >60 ml/min/1.73m2 Calcium 9.1 8.3 - 10.4 MG/DL  
CBC W/O DIFF Collection Time: 10/09/19  6:27 AM  
Result Value Ref Range WBC 7.2 4.3 - 11.1 K/uL  
 RBC 4.65 4.23 - 5.6 M/uL  
 HGB 12.7 (L) 13.6 - 17.2 g/dL HCT 40.7 (L) 41.1 - 50.3 % MCV 87.5 79.6 - 97.8 FL  
 MCH 27.3 26.1 - 32.9 PG  
 MCHC 31.2 (L) 31.4 - 35.0 g/dL  
 RDW 15.1 (H) 11.9 - 14.6 % PLATELET 791 694 - 765 K/uL MPV 9.0 (L) 9.4 - 12.3 FL ABSOLUTE NRBC 0.00 0.0 - 0.2 K/uL Assessment:  
 
Problem List as of 10/11/2019 Date Reviewed: 10/1/2019 Codes Class Noted - Resolved Weakness ICD-10-CM: R53.1 ICD-9-CM: 780.79  10/1/2019 - Present Encephalopathy ICD-10-CM: G93.40 ICD-9-CM: 348.30  10/1/2019 - Present Seizure disorder ([facility] ICD-10-CM: G40.909 ICD-9-CM: 345.90  10/1/2019 - Present Impaired functional mobility, balance, gait, and endurance ICD-10-CM: Z74.09 
ICD-9-CM: V49.89  10/1/2019 - Present Status epilepticus ([facility] ICD-10-CM: Jhonatan Garcia ICD-9-CM: 345.3  9/28/2019 - Present TIA (transient ischemic attack) ICD-10-CM: G45.9 ICD-9-CM: 435.9  9/26/2019 - Present Seizures ([address] ICD-10-CM: R56.9 ICD-9-CM: 780.39  9/26/2019 - Present History of CVA (cerebrovascular accident) ICD-10-CM: Z86.73 
ICD-9-CM: V12.54  9/26/2019 - Present HTN (hypertension) ICD-10-CM: I10 
ICD-9-CM: 401.9  9/26/2019 - Present HLD (hyperlipidemia) ICD-10-CM: E78.5 ICD-9-CM: 272.4  9/26/2019 - Present CAD (coronary artery disease) ICD-10-CM: I25.10 ICD-9-CM: 414.00  9/26/2019 - Present History of prostatectomy ICD-10-CM: Z90.79 ICD-9-CM: V45.89  9/26/2019 - Present History of prostate cancer ICD-10-CM: Z85.46 
ICD-9-CM: V10.46  9/26/2019 - Present Plan:  
 
Rehabilitation Plan The patient has shown the ability to tolerate and benefit from 3 hours of therapy daily and is being admitted to a comprehensive acute inpatient rehabilitation program consisting of at least 3 hours of combined physical and occupational therapies. Continue intensive Physical Therapy for a minimum of 1.5 hours a day, at least 5 out of 7 days per week to address bed mobility, transfers, ambulation, strengthening, balance, and endurance. continue intensive Occupational Therapy for a minimum of 1.5 hours a day, at least 5 out of 7 days per week to address ADL ( bathing, LE dressing, toileting) and adaptive equipment as needed. - weakness BLE, imapired balance major barriers. Becomes easily fatigued. - 10/7 per PTgait pattern -step through ataxic gait pattern with fluctuating width of base of support, decreased step length and step clearance 10/10- making good progress with PT. Mod assist with gait. Patient's wife working with OT, completed modified family training. Cognitive dysfunction remains major barrier. ST - cognitive assessment, post ictal confusion.  deficits include impaired reasoning, attention, expressive language, auditory processing for following directions, and immediate and short-term memory per ST. 
- minimal cognitive clearance.  
  
The patient will also require 24-hour skilled rehabilitation nursing for bowel and bladder management, skin care for decubitus ulcer prevention , pain management and ongoing medication administration  
  
  
Continue daily physician medical management: 
Seizures/ TIA/ encephalopathy 
- keppra 
- added Phenytoin ER. Will measure level in 1 week. - 10/1 monitor. Seizure precautions. - 10/2 - continue Keppra, dilantin. 10/3 - no report of new seizures. 10/4 check dilantin level Monday. No new seizures noted. 10/7 - dilantin sub therapeutic. However will keep current dose. Recheck on Friday. 10/8 - no new seizures. Continue dilantin, keppra at current dose. 10/9 - no notable ictal events. 10/10 - check dilantin level tomorrow.  
  
History of CVA (cerebrovascular accident) (9/26/2019) 
- left LE weakness 
- lipitor 
- aspirin 81 
- aricept - prior R frontal hemorrahagic CVA. 10/3 - stable neuro exam. No new notable focal weakness. 10/4 - stronger, funcitonally improving daily. 10/7 - stable neuro exam. No regression. 10/9 -  
 no new neurologic setbacks. + still cognitive deifits 
  
UTI - + pseudomonas, sensitive to cipro, start po cipro x 5 days. - 10/8 -Treatment finished. Hypertension - BP uncontrolled, fluctuating, managed medically. - continue lisinopril, procardia XL 
- 10/2 - -180s. add norvasc 5. 
10/10 - -140s. Adequate control. No new changes needed. HLD (hyperlipidemia) (9/26/2019) 
- lipitor 
  
CAD (coronary artery disease) - cardiac precaution.  
  
Pneumonia prophylaxis- Insentive spirometer every hour while awake 
  
DVT risk / DVT Prophylaxis-   
- SCDs. History of prostatectomy/ History of prostate cancer (9/26/2019)/ Urinary retention/ neurogenic bladder --  
scheulde voids q2h. As pt incontinent. - Check post-void residual every shift; In and Out catheterize if post-void residual is more than 400 cc. 
- incontinent. Continue to time voids as best can.  
 
 
bowel program - incontinent 
  
GERD - resume PPI. At times may need additional antacids, Maalox prn. 
  
  
 
Time spent was 25 minutes with over 1/2 in direct patient care/examination, consultation and coordination of care. Signed By: Sahyy Duran MD   
 October 11, 2019

## 2019-10-12 NOTE — PROGRESS NOTES
SFD PROGRESS NOTE Maria Guadalupe Morgan Admit Date: 9/30/2019 Admit Diagnosis: Seizure disorder ([facility] [address] [F12.828]; Weakness [R53.1]; Encephalopathy [G93.40];CAD (coronary artery disease) [I25.10]; History of CVA (cerebrovascular accident) [Z86.73]; Impaired functional mobility, balance, gait, and endurance [Z74.09] Subjective  
 
Vss. Afebrile. No new barriers reported in PT/OT. Still continued cognitive deficits demonstrated in OT. Required verbal and visual cueing for thoroughness in ADLs. . Constant steadying assist in standing per OT. Objective:  
 
Current Facility-Administered Medications Medication Dose Route Frequency  NIFEdipine ER (PROCARDIA XL) tablet 60 mg  60 mg Oral BID  lisinopril (PRINIVIL, ZESTRIL) tablet 40 mg  40 mg Oral BID  hydroCHLOROthiazide (HYDRODIURIL) tablet 25 mg  25 mg Oral DAILY  acetaminophen (TYLENOL) tablet 650 mg  650 mg Oral Q4H PRN  
 aspirin chewable tablet 81 mg  81 mg Oral DAILY  bisacodyl (DULCOLAX) tablet 5 mg  5 mg Oral DAILY PRN  
 sodium chloride (NS) flush 5-40 mL  5-40 mL IntraVENous PRN  
 heparin (porcine) injection 5,000 Units  5,000 Units SubCUTAneous Q8H  
 donepezil (ARICEPT) tablet 5 mg  5 mg Oral DAILY  levETIRAcetam (KEPPRA) tablet 1,000 mg  1,000 mg Oral BID  magnesium oxide (MAG-OX) tablet 400 mg  400 mg Oral TID  PARoxetine CR (PAXIL-CR) tablet 25 mg (Patient Supplied)  25 mg Oral DAILY  phenytoin ER (DILANTIN ER) ER capsule 200 mg  200 mg Oral BID  traZODone (DESYREL) tablet 25 mg  25 mg Oral QHS PRN  
 guaiFENesin (ROBITUSSIN) 100 mg/5 mL oral liquid 100 mg  100 mg Oral Q6H PRN  
 atorvastatin (LIPITOR) tablet 40 mg  40 mg Oral QHS Review of Systems: Denies chest pain, shortness of breath, cough, headache, visual problems, abdominal pain, dysurea, calf pain. Pertinent positives are as noted in the medical records and unremarkable otherwise. Visit Vitals /65 Pulse 68 Temp 97.6 °F (36.4 °C) Resp 18 SpO2 95% Physical Exam:  
     
General:   Alert. No acute distress. [place]. HEENT:  Normocephalic, EOMI. No JVD. Lungs:  CTA no wheezing Heart:  Regular rate and rhythm, S1, S2, No obvious murmur Genitourinary: defered Abdomen:  Soft, non-tender to palpation in all four quadrants. Neuromuscular:  PERRL, EOMI 
+ mild generalized weakness BUE, BLE major muscle groups. .  
Sensory - intact grossly 
   
Skin:  calf NT. Edema: none Functional Assessment: 
 
Balance Sitting - Static: Good (unsupported) (10/07/19 1300) Sitting - Dynamic: Fair (occasional) (10/07/19 1300) Standing - Static: Fair (10/07/19 1300) Standing - Dynamic : Impaired (10/07/19 1300) Layla Patches Fall Risk Assessment: 
Jaya Alevs Mobility: Ambulates or transfers with assist devices or assistance (10/11/19 0730) Mobility Interventions: Communicate number of staff needed for ambulation/transfer;Patient to call before getting OOB;Utilize walker, cane, or other assistive device (10/11/19 0730) Mentation: Periodic confusion (10/11/19 0730) Mentation Interventions: Adequate sleep, hydration, pain control;Bed/chair exit alarm; Family/sitter at bedside (10/11/19 0730) Medication: Patient receiving anticonvulsants, sedatives(tranquilizers), psychotropics or hypnotics, hypoglycemics, narcotics, sleep aids, antihypertensives, laxatives, or diuretics (10/11/19 0730) Medication Interventions: Assess postural VS orthostatic hypotension;Bed/chair exit alarm;Evaluate medications/consider consulting pharmacy; Patient to call before getting OOB (10/11/19 0730) Elimination: Incontinence (10/11/19 0730) Elimination Interventions: Bed/chair exit alarm;Call light in reach; Patient to call for help with toileting needs (10/11/19 0730) Prior Fall History: Before admission in past 12 months _home or previous inpatient care) (10/11/19 0730) History of Falls Interventions: Bed/chair exit alarm; Consult care management for discharge planning;Door open when patient unattended (10/11/19 0730) Total Score: 5 (10/11/19 0730) Standard Fall Precautions: Yes (10/04/19 0051) High Fall Risk: Yes (10/11/19 0730) Speech Assessment: 
Aspiration Signs/Symptoms: None (10/01/19 1103) Ambulation: 
Gait Distance (ft): 200 Feet (ft) (10/11/19 1626) Assistive Device: Walker, rolling (10/11/19 1626) Rail Use: Both (10/11/19 1245) Labs/Studies: 
Recent Results (from the past 72 hour(s)) METABOLIC PANEL, BASIC Collection Time: 10/09/19  6:27 AM  
Result Value Ref Range Sodium 143 136 - 145 mmol/L Potassium 3.6 3.5 - 5.1 mmol/L Chloride 106 98 - 107 mmol/L  
 CO2 31 21 - 32 mmol/L Anion gap 6 (L) 7 - 16 mmol/L Glucose 93 65 - 100 mg/dL BUN 23 8 - 23 MG/DL Creatinine 0.90 0.8 - 1.5 MG/DL  
 GFR est AA >60 >60 ml/min/1.73m2 GFR est non-AA >60 >60 ml/min/1.73m2 Calcium 9.1 8.3 - 10.4 MG/DL  
CBC W/O DIFF Collection Time: 10/09/19  6:27 AM  
Result Value Ref Range WBC 7.2 4.3 - 11.1 K/uL  
 RBC 4.65 4.23 - 5.6 M/uL  
 HGB 12.7 (L) 13.6 - 17.2 g/dL HCT 40.7 (L) 41.1 - 50.3 % MCV 87.5 79.6 - 97.8 FL  
 MCH 27.3 26.1 - 32.9 PG  
 MCHC 31.2 (L) 31.4 - 35.0 g/dL  
 RDW 15.1 (H) 11.9 - 14.6 % PLATELET 539 063 - 000 K/uL MPV 9.0 (L) 9.4 - 12.3 FL ABSOLUTE NRBC 0.00 0.0 - 0.2 K/uL Assessment:  
 
Problem List as of 10/11/2019 Date Reviewed: 10/1/2019 Codes Class Noted - Resolved Weakness ICD-10-CM: R53.1 ICD-9-CM: 780.79  10/1/2019 - Present Encephalopathy ICD-10-CM: G93.40 ICD-9-CM: 348.30  10/1/2019 - Present Seizure disorder ([address] ICD-10-CM: G40.909 ICD-9-CM: 345.90  10/1/2019 - Present Impaired functional mobility, balance, gait, and endurance ICD-10-CM: Z74.09 
ICD-9-CM: V49.89  10/1/2019 - Present Status epilepticus ([facility] ICD-10-CM: Fish Sever ICD-9-CM: 345.3  9/28/2019 - Present  
   
 TIA (transient ischemic attack) ICD-10-CM: G45.9 ICD-9-CM: 435.9  9/26/2019 - Present Seizures ([address] ICD-10-CM: R56.9 ICD-9-CM: 780.39  9/26/2019 - Present History of CVA (cerebrovascular accident) ICD-10-CM: Z86.73 
ICD-9-CM: V12.54  9/26/2019 - Present HTN (hypertension) ICD-10-CM: I10 
ICD-9-CM: 401.9  9/26/2019 - Present HLD (hyperlipidemia) ICD-10-CM: E78.5 ICD-9-CM: 272.4  9/26/2019 - Present CAD (coronary artery disease) ICD-10-CM: I25.10 ICD-9-CM: 414.00  9/26/2019 - Present History of prostatectomy ICD-10-CM: Z90.79 ICD-9-CM: V45.89  9/26/2019 - Present History of prostate cancer ICD-10-CM: Z85.46 
ICD-9-CM: V10.46  9/26/2019 - Present Plan:  
 
Rehabilitation Plan The patient has shown the ability to tolerate and benefit from 3 hours of therapy daily and is being admitted to a comprehensive acute inpatient rehabilitation program consisting of at least 3 hours of combined physical and occupational therapies. Continue intensive Physical Therapy for a minimum of 1.5 hours a day, at least 5 out of 7 days per week to address bed mobility, transfers, ambulation, strengthening, balance, and endurance. continue intensive Occupational Therapy for a minimum of 1.5 hours a day, at least 5 out of 7 days per week to address ADL ( bathing, LE dressing, toileting) and adaptive equipment as needed. - weakness BLE, imapired balance major barriers. Becomes easily fatigued. - 10/7 per PTgait pattern -step through ataxic gait pattern with fluctuating width of base of support, decreased step length and step clearance 10/10- making good progress with PT. Mod assist with gait. Patient's wife working with OT, completed modified family training. Cognitive dysfunction remains major barrier. ST - cognitive assessment, post ictal confusion.  deficits include impaired reasoning, attention, expressive language, auditory processing for following directions, and immediate and short-term memory per ST. 
- minimal cognitive clearance.  
  
The patient will also require 24-hour skilled rehabilitation nursing for bowel and bladder management, skin care for decubitus ulcer prevention , pain management and ongoing medication administration  
  
  
Continue daily physician medical management: 
Seizures/ TIA/ encephalopathy 
- keppra 
- added Phenytoin ER. Will measure level in 1 week. - 10/1 monitor. Seizure precautions. - 10/2 - continue Keppra, dilantin. 10/3 - no report of new seizures. 10/4 check dilantin level Monday. No new seizures noted. 10/7 - dilantin sub therapeutic. However will keep current dose. Recheck on Friday. 10/8 - no new seizures. Continue dilantin, keppra at current dose. 10/9 - no notable ictal events. 10/11- no new seizures.  
  
History of CVA (cerebrovascular accident) (9/26/2019) 
- left LE weakness 
- lipitor 
- aspirin 81 
- aricept - prior R frontal hemorrahagic CVA. 10/11 - +cognitive deifits. Remains a major barrier, requires cure for ADLs.   
UTI - + pseudomonas, sensitive to cipro, start po cipro x 5 days. - 10/8 -Treatment finished. Hypertension -  
- continue lisinopril, procardia XL, HCTZ. HLD (hyperlipidemia) (9/26/2019) 
- lipitor 
  
CAD (coronary artery disease) - cardiac precaution. asymptomatic.  
 
  
DVT risk / DVT Prophylaxis-   
- SCDs. History of prostatectomy/ History of prostate cancer (9/26/2019)/ Urinary retention/ neurogenic bladder --  
scheulde voids q2h. As pt incontinent. - Check post-void residual every shift; In and Out catheterize if post-void residual is more than 400 cc. 
- incontinent. Continue to time voids as best can.  
 
 
bowel program - continent. 
+ BM. GERD - resume PPI.  At times may need additional antacids, Maalox prn. 
  
  
 
 Time spent was 25 minutes with over 1/2 in direct patient care/examination, consultation and coordination of care. Signed By: Thom Manuel MD   
 October 11, 2019

## 2019-10-12 NOTE — PROGRESS NOTES
SFD PROGRESS NOTE Edwin Shook Admit Date: 9/30/2019 Admit Diagnosis: Seizure disorder ([facility] [address] [P83.178]; Weakness [R53.1]; Encephalopathy [G93.40];CAD (coronary artery disease) [I25.10]; History of CVA (cerebrovascular accident) [Z86.73]; Impaired functional mobility, balance, gait, and endurance [Z74.09] Subjective  
 
Vss. Afebrile. Alert and motivated. Ambulating loner distances, still at CGA/ min assist level. Mild confusion, improved awareness during gait today. Progressing toward functional goals. Objective:  
 
Current Facility-Administered Medications Medication Dose Route Frequency  NIFEdipine ER (PROCARDIA XL) tablet 60 mg  60 mg Oral BID  lisinopril (PRINIVIL, ZESTRIL) tablet 40 mg  40 mg Oral BID  hydroCHLOROthiazide (HYDRODIURIL) tablet 25 mg  25 mg Oral DAILY  acetaminophen (TYLENOL) tablet 650 mg  650 mg Oral Q4H PRN  
 aspirin chewable tablet 81 mg  81 mg Oral DAILY  bisacodyl (DULCOLAX) tablet 5 mg  5 mg Oral DAILY PRN  
 sodium chloride (NS) flush 5-40 mL  5-40 mL IntraVENous PRN  
 heparin (porcine) injection 5,000 Units  5,000 Units SubCUTAneous Q8H  
 donepezil (ARICEPT) tablet 5 mg  5 mg Oral DAILY  levETIRAcetam (KEPPRA) tablet 1,000 mg  1,000 mg Oral BID  magnesium oxide (MAG-OX) tablet 400 mg  400 mg Oral TID  PARoxetine CR (PAXIL-CR) tablet 25 mg (Patient Supplied)  25 mg Oral DAILY  phenytoin ER (DILANTIN ER) ER capsule 200 mg  200 mg Oral BID  traZODone (DESYREL) tablet 25 mg  25 mg Oral QHS PRN  
 guaiFENesin (ROBITUSSIN) 100 mg/5 mL oral liquid 100 mg  100 mg Oral Q6H PRN  
 atorvastatin (LIPITOR) tablet 40 mg  40 mg Oral QHS Review of Systems: Denies chest pain, shortness of breath, cough, headache, visual problems, abdominal pain, dysurea, calf pain. Pertinent positives are as noted in the medical records and unremarkable otherwise. Visit Vitals /63 Pulse 63 Temp 98.3 °F (36.8 °C) Resp 16 SpO2 95% Physical Exam:  
     
General:   Alert. No acute distress. [place]. Mild intermittent confusion. HEENT:  Normocephalic, EOMI. No JVD. Lungs:  CTA no wheezing Heart:  Regular rate and rhythm, S1, S2, No obvious murmur Genitourinary: defered Abdomen:  Soft, NT/ND, BS+ Neuromuscular:  PERRL, EOMI 
+ mild generalized weakness BUE, BLE major muscle groups. .  
Sensory - intact grossly 
   
Skin:  calf NT. Edema: none Functional Assessment: 
 
Balance Sitting - Static: Good (unsupported) (10/07/19 1300) Sitting - Dynamic: Fair (occasional) (10/07/19 1300) Standing - Static: Fair (10/07/19 1300) Standing - Dynamic : Impaired (10/07/19 1300) Donna Vo Fall Risk Assessment: 
Pierre Alves Mobility: Ambulates or transfers with assist devices or assistance (10/12/19 0830) Mobility Interventions: Bed/chair exit alarm; Patient to call before getting OOB (10/12/19 0830) Mentation: Confusion at all times (10/12/19 0830) Mentation Interventions: Bed/chair exit alarm; Door open when patient unattended;Familiar objects from home; Increase mobility; Reorient patient (10/12/19 0830) Medication: Patient receiving anticonvulsants, sedatives(tranquilizers), psychotropics or hypnotics, hypoglycemics, narcotics, sleep aids, antihypertensives, laxatives, or diuretics (10/12/19 0830) Medication Interventions: Bed/chair exit alarm; Patient to call before getting OOB; Teach patient to arise slowly (10/12/19 0830) Elimination: Incontinence (10/12/19 0830) Elimination Interventions: Call light in reach; Patient to call for help with toileting needs (10/12/19 0830) Prior Fall History: Before admission in past 12 months _home or previous inpatient care) (10/12/19 0830) History of Falls Interventions: Bed/chair exit alarm; Consult care management for discharge planning;Door open when patient unattended;Evaluate medications/consider consulting pharmacy; Investigate reason for fall (10/12/19 0015) Total Score: 5 (10/12/19 0830) Standard Fall Precautions: Yes (10/12/19 0830) High Fall Risk: Yes (10/12/19 0830) Speech Assessment: 
Aspiration Signs/Symptoms: None (10/01/19 1103) Ambulation: 
Gait Distance (ft): 200 Feet (ft) (10/11/19 1626) Assistive Device: Walker, rolling (10/11/19 1626) Rail Use: Both (10/11/19 1245) Labs/Studies: 
Recent Results (from the past 72 hour(s)) PHENYTOIN Collection Time: 10/12/19  6:47 AM  
Result Value Ref Range Phenytoin 6.9 (L) 10 - 20 ug/mL Assessment:  
 
Problem List as of 10/12/2019 Date Reviewed: 10/1/2019 Codes Class Noted - Resolved Weakness ICD-10-CM: R53.1 ICD-9-CM: 780.79  10/1/2019 - Present Encephalopathy ICD-10-CM: G93.40 ICD-9-CM: 348.30  10/1/2019 - Present Seizure disorder ([email or web address] ICD-10-CM: G40.909 ICD-9-CM: 345.90  10/1/2019 - Present Impaired functional mobility, balance, gait, and endurance ICD-10-CM: Z74.09 
ICD-9-CM: V49.89  10/1/2019 - Present Status epilepticus ([facility] [[email or web address].) ICD-10-CM: Sueanne Gene ICD-9-CM: 345.3  9/28/2019 - Present  
   
 TIA (transient ischemic attack) ICD-10-CM: G45.9 ICD-9-CM: 435.9  9/26/2019 - Present Seizures ([facility] [[email or web address].) ICD-10-CM: R56.9 ICD-9-CM: 780.39  9/26/2019 - Present History of CVA (cerebrovascular accident) ICD-10-CM: Z86.73 
ICD-9-CM: V12.54  9/26/2019 - Present HTN (hypertension) ICD-10-CM: I10 
ICD-9-CM: 401.9  9/26/2019 - Present HLD (hyperlipidemia) ICD-10-CM: E78.5 ICD-9-CM: 272.4  9/26/2019 - Present CAD (coronary artery disease) ICD-10-CM: I25.10 ICD-9-CM: 414.00  9/26/2019 - Present History of prostatectomy ICD-10-CM: Z90.79 ICD-9-CM: V45.89  9/26/2019 - Present History of prostate cancer ICD-10-CM: Z85.46 
ICD-9-CM: V10.46  9/26/2019 - Present Plan:  
 
Rehabilitation Plan The patient has shown the ability to tolerate and benefit from 3 hours of therapy daily and is being admitted to a comprehensive acute inpatient rehabilitation program consisting of at least 3 hours of combined physical and occupational therapies. Continue intensive Physical Therapy for a minimum of 1.5 hours a day, at least 5 out of 7 days per week to address bed mobility, transfers, ambulation, strengthening, balance, and endurance. continue intensive Occupational Therapy for a minimum of 1.5 hours a day, at least 5 out of 7 days per week to address ADL ( bathing, LE dressing, toileting) and adaptive equipment as needed. - weakness BLE, imapired balance major barriers. Becomes easily fatigued. - 10/7 per PTgait pattern -step through ataxic gait pattern with fluctuating width of base of support, decreased step length and step clearance 10/10- making good progress with PT. Mod assist with gait. Patient's wife working with OT, completed modified family training. Cognitive dysfunction remains major barrier. ST - cognitive assessment, post ictal confusion. deficits include impaired reasoning, attention, expressive language, auditory processing for following directions, and immediate and short-term memory per ST. 
- minimal cognitive clearance.  
  
The patient will also require 24-hour skilled rehabilitation nursing for bowel and bladder management, skin care for decubitus ulcer prevention , pain management and ongoing medication administration  
  
  
Continue daily physician medical management: 
Seizures/ TIA/ encephalopathy 
- keppra 
- added Phenytoin ER. Will measure level in 1 week. - 10/1 monitor. Seizure precautions. - 10/2 - continue Keppra, dilantin. 10/3 - no report of new seizures. 10/4 check dilantin level Monday. No new seizures noted. 10/7 - dilantin sub therapeutic. However will keep current dose. Recheck on Friday. 10/8 - no new seizures. Continue dilantin, keppra at current dose. 10/9 - no notable ictal events. 10/11- no new seizures. 10/12 - Continue keppra and dilantin , level slight higher, yet mildly sub therpeutic level. Will conitue current dose checkl evel as pout pt.  
  
History of CVA (cerebrovascular accident) (9/26/2019) 
- left LE weakness 
- lipitor 
- aspirin 81 
- aricept - prior R frontal hemorrahagic CVA. 10/11 - +cognitive deifits. Remains a major barrier, requires cure for ADLs.   
UTI - + pseudomonas, sensitive to cipro, start po cipro x 5 days. - 10/8 -Treatment finished. Hypertension -  
- continue lisinopril, procardia XL, HCTZ. 10/12 - -140s continue current meds, doses. HLD (hyperlipidemia) (9/26/2019) 
- lipitor 
  
CAD (coronary artery disease) - cardiac precaution. asymptomatic.  
 
  
DVT risk / DVT Prophylaxis-   
- SCDs. History of prostatectomy/ History of prostate cancer (9/26/2019)/ Urinary retention/ neurogenic bladder --  
scheulde voids q2h. As pt incontinent. - Check post-void residual every shift; In and Out catheterize if post-void residual is more than 400 cc. 
- incontinent. Continue to time voids as best can. - occasionally incontinent. chronic issue 
 
 
bowel program - continent. 
+ BM. GERD - resume PPI. At times may need additional antacids, Maalox prn. 
  
  
 
Time spent was 25 minutes with over 1/2 in direct patient care/examination, consultation and coordination of care. Signed By: Laura Rutherford MD   
 October 12, 2019

## 2019-10-12 NOTE — PROGRESS NOTES
10/12/19 5441 Time Spent With Patient Time In 0908 Time Out 4378 Patient Seen For: AM  
Mental Status Neurologic State Alert Cognition Impaired decision making; Impulsive Perseveration Perseverates during conversation Safety/Judgement Home safety; Fall prevention 10/12/19 2840 Verbal Problem Solving Exercises ID of Unsafe Situation in Picture Accuracy (%) 70 % basic level with moderate cues Effect Level of Impairment Simple Effect Accuracy (%) 50 % Solution Level of Impairment Simple Solution Accuracy (%) 25 %; topic perseverations and difficulty shifting tasks Neuro-Linguistic Exercises Verbal Problem Solving Impaired Verbal Organization Impaired Attention  Impaired 10/12/19 5413 Verbal Expression Naming Impaired Responsive (%) 50 % Patient participated with cognitive therapy. Basic reasoning to identify unsafe aspects in picture cards with 70% accuracy given moderate cues. 50% accuracy to state potential effects with topic perseverations noted and difficulty shifting tasks. MaxA to provide a solution for the situation provided. Patient was able to repeat correct responses after clinician with min-mod cues. Responsive naming during basic reasoning task completed 3/5. Answering yes/no questions related to hospital stay and context based questions with 80% accuracy given additional time. Patient is more responsive and engaged when given visual stimuli versus auditory alone. Presents with processing, reasoning, attention, word finding, and immediate/working memory deficits.  
 
Reji Collins MS, CCC-SLP

## 2019-10-13 NOTE — PROGRESS NOTES
[facility] PROGRESS NOTE [place] Admit Date: 9/30/2019 Admit Diagnosis: Seizure disorder ([address] [H48.075]; Weakness [R53.1]; Encephalopathy [G93.40];CAD (coronary artery disease) [I25.10]; History of CVA (cerebrovascular accident) [Z86.73]; Impaired functional mobility, balance, gait, and endurance [Z74.09] Subjective  
 
Vss. Afebrile. Denies chest pain, sob, cough, headache. Progressing slowly toward short term goals. Objective:  
 
Current Facility-Administered Medications Medication Dose Route Frequency  NIFEdipine ER (PROCARDIA XL) tablet 60 mg  60 mg Oral BID  lisinopril (PRINIVIL, ZESTRIL) tablet 40 mg  40 mg Oral BID  hydroCHLOROthiazide (HYDRODIURIL) tablet 25 mg  25 mg Oral DAILY  acetaminophen (TYLENOL) tablet 650 mg  650 mg Oral Q4H PRN  
 aspirin chewable tablet 81 mg  81 mg Oral DAILY  bisacodyl (DULCOLAX) tablet 5 mg  5 mg Oral DAILY PRN  
 sodium chloride (NS) flush 5-40 mL  5-40 mL IntraVENous PRN  
 heparin (porcine) injection 5,000 Units  5,000 Units SubCUTAneous Q8H  
 donepezil (ARICEPT) tablet 5 mg  5 mg Oral DAILY  levETIRAcetam (KEPPRA) tablet 1,000 mg  1,000 mg Oral BID  magnesium oxide (MAG-OX) tablet 400 mg  400 mg Oral TID  PARoxetine CR (PAXIL-CR) tablet 25 mg (Patient Supplied)  25 mg Oral DAILY  phenytoin ER (DILANTIN ER) ER capsule 200 mg  200 mg Oral BID  traZODone (DESYREL) tablet 25 mg  25 mg Oral QHS PRN  
 guaiFENesin (ROBITUSSIN) 100 mg/5 mL oral liquid 100 mg  100 mg Oral Q6H PRN  
 atorvastatin (LIPITOR) tablet 40 mg  40 mg Oral QHS Review of Systems: Denies chest pain, shortness of breath, cough, headache, visual problems, abdominal pain, dysurea, calf pain. Pertinent positives are as noted in the medical records and unremarkable otherwise. Visit Vitals /71 Pulse (!) 57 Temp 97.8 °F (36.6 °C) Resp 18 SpO2 95% Physical Exam:  
     
General:   Alert. No acute distress. [place]- hearing aids in ears. HEENT:  Normocephalic, EOMI. No JVD. Lungs:  CTA no wheezing Heart:  Regular rate and rhythm, S1, S2, No obvious murmur Genitourinary: defered Abdomen:  Soft, NT/ND, BS+ Neuromuscular:  PERRL, EOMI 
+ mild generalized weakness BUE, BLE major muscle groups. .  
Sensory - intact grossly 
   
Skin:  calf NT. Edema: none Functional Assessment: 
 
Balance Sitting - Static: Good (unsupported) (10/07/19 1300) Sitting - Dynamic: Fair (occasional) (10/07/19 1300) Standing - Static: Fair (10/07/19 1300) Standing - Dynamic : Impaired (10/07/19 1300) Vilma Dear Fall Risk Assessment: 
Ann Caseer Risk Mobility: Ambulates or transfers with assist devices or assistance (10/13/19 6160) Mobility Interventions: Patient to call before getting OOB (10/13/19 0651) Mentation: Confusion at all times (10/13/19 5230) Mentation Interventions: Bed/chair exit alarm (10/13/19 7352) Medication: Patient receiving anticonvulsants, sedatives(tranquilizers), psychotropics or hypnotics, hypoglycemics, narcotics, sleep aids, antihypertensives, laxatives, or diuretics (10/13/19 7002) Medication Interventions: Bed/chair exit alarm; Patient to call before getting OOB (10/13/19 1229) Elimination: Incontinence (10/13/19 4837) Elimination Interventions: Bed/chair exit alarm;Call light in reach; Patient to call for help with toileting needs (10/13/19 0244) Prior Fall History: Before admission in past 12 months _home or previous inpatient care) (10/13/19 3489) History of Falls Interventions: Bed/chair exit alarm; Door open when patient unattended (10/13/19 1194) Total Score: 5 (10/13/19 3530) Standard Fall Precautions: Yes (10/13/19 6534) High Fall Risk: Yes (10/13/19 1573) Speech Assessment: 
Aspiration Signs/Symptoms: None (10/01/19 1103) Ambulation: 
Gait Distance (ft): 200 Feet (ft) (10/12/19 1500) Assistive Device: Walker, rolling (10/12/19 1500) Rail Use: Both (10/11/19 1245) Labs/Studies: 
Recent Results (from the past 72 hour(s)) PHENYTOIN Collection Time: 10/12/19  6:47 AM  
Result Value Ref Range Phenytoin 6.9 (L) 10 - 20 ug/mL Assessment:  
 
Problem List as of 10/13/2019 Date Reviewed: 10/1/2019 Codes Class Noted - Resolved Weakness ICD-10-CM: R53.1 ICD-9-CM: 780.79  10/1/2019 - Present Encephalopathy ICD-10-CM: G93.40 ICD-9-CM: 348.30  10/1/2019 - Present Seizure disorder ([facility] ICD-10-CM: G40.909 ICD-9-CM: 345.90  10/1/2019 - Present Impaired functional mobility, balance, gait, and endurance ICD-10-CM: Z74.09 
ICD-9-CM: V49.89  10/1/2019 - Present Status epilepticus ([facility] ICD-10-CM: [place] ICD-9-CM: 345.3  9/28/2019 - Present  
   
 TIA (transient ischemic attack) ICD-10-CM: G45.9 ICD-9-CM: 435.9  9/26/2019 - Present Seizures ([facility] ICD-10-CM: R56.9 ICD-9-CM: 780.39  9/26/2019 - Present History of CVA (cerebrovascular accident) ICD-10-CM: Z86.73 
ICD-9-CM: V12.54  9/26/2019 - Present HTN (hypertension) ICD-10-CM: I10 
ICD-9-CM: 401.9  9/26/2019 - Present HLD (hyperlipidemia) ICD-10-CM: E78.5 ICD-9-CM: 272.4  9/26/2019 - Present CAD (coronary artery disease) ICD-10-CM: I25.10 ICD-9-CM: 414.00  9/26/2019 - Present History of prostatectomy ICD-10-CM: Z90.79 ICD-9-CM: V45.89  9/26/2019 - Present History of prostate cancer ICD-10-CM: Z85.46 
ICD-9-CM: V10.46  9/26/2019 - Present Plan:  
 
Rehabilitation Plan The patient has shown the ability to tolerate and benefit from 3 hours of therapy daily and is being admitted to a comprehensive acute inpatient rehabilitation program consisting of at least 3 hours of combined physical and occupational therapies.  
Continue intensive Physical Therapy for a minimum of 1.5 hours a day, at least 5 out of 7 days per week to address bed mobility, transfers, ambulation, strengthening, balance, and endurance. continue intensive Occupational Therapy for a minimum of 1.5 hours a day, at least 5 out of 7 days per week to address ADL ( bathing, LE dressing, toileting) and adaptive equipment as needed. - weakness BLE, imapired balance major barriers. Becomes easily fatigued. - 10/7 per PTgait pattern -step through ataxic gait pattern with fluctuating width of base of support, decreased step length and step clearance 10/10- making good progress with PT. Mod assist with gait. Patient's wife working with OT, completed modified family training. Cognitive dysfunction remains major barrier. ST - cognitive assessment, post ictal confusion. deficits include impaired reasoning, attention, expressive language, auditory processing for following directions, and immediate and short-term memory per ST. 
- minimal cognitive clearance.  
  
The patient will also require 24-hour skilled rehabilitation nursing for bowel and bladder management, skin care for decubitus ulcer prevention , pain management and ongoing medication administration  
  
  
Continue daily physician medical management: 
Seizures/ TIA/ encephalopathy 
- keppra 
- added Phenytoin ER. Will measure level in 1 week. - 10/1 monitor. Seizure precautions. - 10/2 - continue Keppra, dilantin. 10/3 - no report of new seizures. 10/4 check dilantin level Monday. No new seizures noted. 10/7 - dilantin sub therapeutic. However will keep current dose. Recheck on Friday. 10/12 - Continue keppra and dilantin , level slight higher, yet mildly sub therpeutic level. Will conitue current dose checkl evel as pout pt.  
  
History of CVA (cerebrovascular accident) (9/26/2019) 
- left LE weakness 
- lipitor 
- aspirin 81 
- aricept - prior R frontal hemorrahagic CVA. 10/11 - +cognitive deifits. Remains a major barrier, requires cure for ADLs. 10 13- continue antiplatelet therapy, secondary prevention.  
  
UTI - + pseudomonas, sensitive to cipro, start po cipro x 5 days. - 10/8 -Treatment finished. Hypertension -  
- continue lisinopril, procardia XL, HCTZ. 10/12 - -140s continue current meds, doses. HLD (hyperlipidemia) (9/26/2019) 
- lipitor 
  
CAD (coronary artery disease) - cardiac precaution. asymptomatic.  
 
  
DVT risk / DVT Prophylaxis-   
- SCDs. History of prostatectomy/ History of prostate cancer (9/26/2019)/ Urinary retention/ neurogenic bladder --  
scheulde voids q2h. As pt incontinent. - Check post-void residual every shift; In and Out catheterize if post-void residual is more than 400 cc. 
- incontinent. Continue to time voids as best can. - occasionally incontinent. chronic issue 
 
 
bowel program - continent. 
+ BM. GERD - resume PPI. At times may need additional antacids, Maalox prn. 
  
 
Time spent was 25 minutes with over 1/2 in direct patient care/examination, consultation and coordination of care. Signed By: Doris Pires MD   
 October 13, 2019

## 2019-10-13 NOTE — PROGRESS NOTES
Pt up walking room he is very unsteady. Wife upset and crying and yelling. Wife encouraged to go home and rest.  Reassured her that pt would be take well care of. Azucena SIMON siting with pt at present. Bed alarm on. Will continue to monitor pt during the night. Ativan 0.5 mg given po.

## 2019-10-13 NOTE — PROGRESS NOTES
Problem: Falls - Risk of 
Goal: *Absence of Falls Description Document Enrike Brumfield Fall Risk and appropriate interventions in the flowsheet. Outcome: Progressing Towards Goal 
Note:  
Fall Risk Interventions: 
Mobility Interventions: Communicate number of staff needed for ambulation/transfer Mentation Interventions: Bed/chair exit alarm Medication Interventions: Bed/chair exit alarm Elimination Interventions: Call light in reach History of Falls Interventions: Bed/chair exit alarm

## 2019-10-14 NOTE — PROGRESS NOTES
[facility] PROGRESS NOTE [place] Admit Date: 9/30/2019 Admit Diagnosis: Seizure disorder ([facility] [address] [S30.308]; Weakness [R53.1]; Encephalopathy [G93.40];CAD (coronary artery disease) [I25.10]; History of CVA (cerebrovascular accident) [Z86.73]; Impaired functional mobility, balance, gait, and endurance [Z74.09] Subjective  
 
Vss. Afebrile. Wife upset yesterday evening as patient was ugly to her. This morning both in better mood. Wife expresses anxiety about taking him home. Objective:  
 
Current Facility-Administered Medications Medication Dose Route Frequency  LORazepam (ATIVAN) tablet 0.5 mg  0.5 mg Oral Q6H PRN  
 NIFEdipine ER (PROCARDIA XL) tablet 60 mg  60 mg Oral BID  lisinopril (PRINIVIL, ZESTRIL) tablet 40 mg  40 mg Oral BID  hydroCHLOROthiazide (HYDRODIURIL) tablet 25 mg  25 mg Oral DAILY  acetaminophen (TYLENOL) tablet 650 mg  650 mg Oral Q4H PRN  
 aspirin chewable tablet 81 mg  81 mg Oral DAILY  bisacodyl (DULCOLAX) tablet 5 mg  5 mg Oral DAILY PRN  
 sodium chloride (NS) flush 5-40 mL  5-40 mL IntraVENous PRN  
 heparin (porcine) injection 5,000 Units  5,000 Units SubCUTAneous Q8H  
 donepezil (ARICEPT) tablet 5 mg  5 mg Oral DAILY  levETIRAcetam (KEPPRA) tablet 1,000 mg  1,000 mg Oral BID  magnesium oxide (MAG-OX) tablet 400 mg  400 mg Oral TID  PARoxetine CR (PAXIL-CR) tablet 25 mg (Patient Supplied)  25 mg Oral DAILY  phenytoin ER (DILANTIN ER) ER capsule 200 mg  200 mg Oral BID  traZODone (DESYREL) tablet 25 mg  25 mg Oral QHS PRN  
 guaiFENesin (ROBITUSSIN) 100 mg/5 mL oral liquid 100 mg  100 mg Oral Q6H PRN  
 atorvastatin (LIPITOR) tablet 40 mg  40 mg Oral QHS Review of Systems: Denies chest pain, shortness of breath, cough, headache, visual problems, abdominal pain, dysurea, calf pain. Pertinent positives are as noted in the medical records and unremarkable otherwise. Visit Vitals /73 Pulse 60 Temp 98.7 °F (37.1 °C) Resp 14 SpO2 95% Physical Exam:  
     
General:  Alert. No acute distress. [place]- hearing aids in ears. HEENT:  Normocephalic, EOMI. No JVD. Lungs:  CTA no wheezing Heart:  Regular rate and rhythm, S1, S2, No obvious murmur Genitourinary: defered Abdomen:  Soft, NT/ND, BS+ Neuromuscular:  PERRL, EOMI 
+ mild generalized weakness BUE, BLE major muscle groups. .  
Sensory - intact grossly 
   
Skin:  calf NT. Edema: none Functional Assessment: 
 
Balance Sitting - Static: Good (unsupported) (10/07/19 1300) Sitting - Dynamic: Fair (occasional) (10/07/19 1300) Standing - Static: Fair (10/07/19 1300) Standing - Dynamic : Impaired (10/07/19 1300) Cheryl Maier Fall Risk Assessment: 
Aimee Alves Mobility: Ambulates or transfers with assist devices or assistance (10/14/19 0730) Mobility Interventions: Patient to call before getting OOB (10/13/19 1942) Mentation: Confusion at all times (10/13/19 1942) Mentation Interventions: Bed/chair exit alarm (10/13/19 1942) Medication: Patient receiving anticonvulsants, sedatives(tranquilizers), psychotropics or hypnotics, hypoglycemics, narcotics, sleep aids, antihypertensives, laxatives, or diuretics (10/13/19 1942) Medication Interventions: Bed/chair exit alarm; Patient to call before getting OOB (10/13/19 1942) Elimination: Incontinence (10/13/19 1942) Elimination Interventions: Bed/chair exit alarm; Stay With Me (per policy); Patient to call for help with toileting needs (10/13/19 1942) Prior Fall History: Before admission in past 12 months _home or previous inpatient care) (10/13/19 1942) History of Falls Interventions: Bed/chair exit alarm (10/13/19 1942) Total Score: 5 (10/13/19 1942) Standard Fall Precautions: Yes (10/13/19 1942) High Fall Risk: Yes (10/13/19 1942) Speech Assessment: 
Aspiration Signs/Symptoms: None (10/01/19 1103) Ambulation: 
Gait Distance (ft): 200 Feet (ft) (10/12/19 1500) Assistive Device: Walker, rolling (10/12/19 1500) Rail Use: Both (10/11/19 1245) Labs/Studies: 
Recent Results (from the past 72 hour(s)) PHENYTOIN Collection Time: 10/12/19  6:47 AM  
Result Value Ref Range Phenytoin 6.9 (L) 10 - 20 ug/mL Assessment:  
 
Problem List as of 10/14/2019 Date Reviewed: 10/1/2019 Codes Class Noted - Resolved Weakness ICD-10-CM: R53.1 ICD-9-CM: 780.79  10/1/2019 - Present Encephalopathy ICD-10-CM: G93.40 ICD-9-CM: 348.30  10/1/2019 - Present Seizure disorder ([facility] ICD-10-CM: G40.909 ICD-9-CM: 345.90  10/1/2019 - Present Impaired functional mobility, balance, gait, and endurance ICD-10-CM: Z74.09 
ICD-9-CM: V49.89  10/1/2019 - Present Status epilepticus ([facility] ICD-10-CM: [place] ICD-9-CM: 345.3  9/28/2019 - Present  
   
 TIA (transient ischemic attack) ICD-10-CM: G45.9 ICD-9-CM: 435.9  9/26/2019 - Present Seizures ([facility] ICD-10-CM: R56.9 ICD-9-CM: 780.39  9/26/2019 - Present History of CVA (cerebrovascular accident) ICD-10-CM: Z86.73 
ICD-9-CM: V12.54  9/26/2019 - Present HTN (hypertension) ICD-10-CM: I10 
ICD-9-CM: 401.9  9/26/2019 - Present HLD (hyperlipidemia) ICD-10-CM: E78.5 ICD-9-CM: 272.4  9/26/2019 - Present CAD (coronary artery disease) ICD-10-CM: I25.10 ICD-9-CM: 414.00  9/26/2019 - Present History of prostatectomy ICD-10-CM: Z90.79 ICD-9-CM: V45.89  9/26/2019 - Present History of prostate cancer ICD-10-CM: Z85.46 
ICD-9-CM: V10.46  9/26/2019 - Present Plan:  
 
Rehabilitation Plan The patient has shown the ability to tolerate and benefit from 3 hours of therapy daily and is being admitted to a comprehensive acute inpatient rehabilitation program consisting of at least 3 hours of combined physical and occupational therapies.  
Continue intensive Physical Therapy for a minimum of 1.5 hours a day, at least 5 out of 7 days per week to address bed mobility, transfers, ambulation, strengthening, balance, and endurance. continue intensive Occupational Therapy for a minimum of 1.5 hours a day, at least 5 out of 7 days per week to address ADL ( bathing, LE dressing, toileting) and adaptive equipment as needed. - weakness BLE, imapired balance major barriers. Becomes easily fatigued. - 10/7 per PTgait pattern -step through ataxic gait pattern with fluctuating width of base of support, decreased step length and step clearance 10/10- making good progress with PT. Mod assist with gait. Patient's wife working with OT, completed modified family training. Cognitive dysfunction remains major barrier. ST - cognitive assessment, post ictal confusion. deficits include impaired reasoning, attention, expressive language, auditory processing for following directions, and immediate and short-term memory per ST. 
- minimal cognitive clearance.  
  
The patient will also require 24-hour skilled rehabilitation nursing for bowel and bladder management, skin care for decubitus ulcer prevention , pain management and ongoing medication administration  
  
  
Continue daily physician medical management: 
Seizures/ TIA/ encephalopathy 
- keppra 
- added Phenytoin ER. Will measure level in 1 week. - 10/1 monitor. Seizure precautions. - 10/2 - continue Keppra, dilantin. 10/3 - no report of new seizures. 10/4 check dilantin level Monday. No new seizures noted. 10/7 - dilantin sub therapeutic. However will keep current dose. Recheck on Friday. 10/12 - Continue keppra and dilantin , level slight higher, yet mildly sub therpeutic level. Will conitue current dose checkl evel as pout pt.  
10/14 no new seizures, pt will follow up with neurology regarding seizure treatment.  
  
History of CVA (cerebrovascular accident) (9/26/2019) 
- left LE weakness 
- lipitor 
- aspirin 81 
- aricept - prior R frontal hemorrahagic CVA. 10/11 - +cognitive deifits. Remains a major barrier, requires cure for ADLs. 10 13- continue antiplatelet therapy, secondary prevention. 10/14 - no new motor/ sensory deficits. Functionally patient still not at his baseline according to the families assessment of prior function.  
  
UTI - + pseudomonas, sensitive to cipro, start po cipro x 5 days. - 10/8 -Treatment finished. Hypertension -  
- continue lisinopril, procardia XL, HCTZ. 10/12 - -140s continue current meds, doses. 10/14 - -140s. adequate control. Will continue current regiment, avoid hypotension. HLD (hyperlipidemia) (9/26/2019) 
- lipitor 
  
CAD (coronary artery disease) - cardiac precaution. .  
 
  
DVT risk / DVT Prophylaxis-   
10/14 - no s/s of DVT. Continue SCDs. History of prostatectomy/ History of prostate cancer (9/26/2019)/ Urinary retention/ neurogenic bladder --  
scheulde voids q2h. As pt incontinent. - Check post-void residual every shift; In and Out catheterize if post-void residual is more than 400 cc. 
- incontinent. Continue to time voids as best can. - occasionally incontinent. chronic issue 
 
 
bowel program - continent. 
+ BM. GERD - resume PPI. At times may need additional antacids, Maalox prn. 
  
 
Time spent was 25 minutes with over 1/2 in direct patient care/examination, consultation and coordination of care. Signed By: Giovani Marie MD   
 October 14, 2019

## 2019-10-14 NOTE — PROGRESS NOTES
10/14/19 1041 Time Spent With Patient Time In 0825 Time Out 0911 Patient Seen For: AM  
Mental Status Neurologic State Alert Cognition Memory loss;Decreased attention/concentration; Impulsive Perception Appears intact Perseveration Perseverates during conversation Safety/Judgement Home safety 10/14/19 1042 Verbal Problem Solving Exercises Solution Level of Impairment Simple Solution Accuracy (%) 45 % answering basi wh- questions related to hospital/household ADLs Neuro-Linguistic Exercises Verbal Problem Solving Impaired; MaxA to identify items within a category during basic reading task due to decreased attention to task and working memory of instructions 10/14/19 1042 Auditory Comprehension Auditory Impairment Yes Response to Basic Yes/No Questions (%) 80 % One-Step Basic Commands (%) 75 % following one step written instructions Patient read the instructions with 100% accuracy but with decreased initiation and recall to follow through with completion of each step Interfering Components Attention - sustained Patient participated with basic cognitive therapy. Answering wh- questions related to simple hospital/household ADLs with 45% accuracy; topic perseverations. MaxA identifying items within a given category during basic reading task due to decreased attention and working memory of instructions. 80% accuracy with basic comprehension task to answer general yes/no questions. 75% accuracy following written one step directions with patient reading the instructions with 100% accuracy but demonstrating decreased initiation and recall to follow through with completion and verbal cues required. Patient was more interactive today with more attempts to respond to open ended questions this date. Recommend continued therapy to address significant cognitive-linguistic deficits.  
 
 
Dona Leon MS, CCC-SLP

## 2019-10-14 NOTE — PROGRESS NOTES
PHYSICAL THERAPY DAILY NOTE Time In: 9438 Time Out: 1002 Patient Seen For: AM;Other (see progress notes);Gait training; Therapeutic exercise;Transfer training Subjective: Patient had no complaints. Objective: NO pain noted. Seizure, Other (comment)(Fall precautions, ) GROSS ASSESSMENT Daily Assessment COGNITION Daily Assessment BED/MAT MOBILITY Daily Assessment Supine to Sit : 5 (Supervision) Sit to Supine : 5 (Supervision) TRANSFERS Daily Assessment Transfer Type: SPT with walker Transfer Assistance : 4 (Contact guard assistance) Sit to Stand Assistance: Stand-by assistance GAIT Daily Assessment Amount of Assistance: 4 (Minimal assistance) Distance (ft): 200 Feet (ft) Assistive Device: Walker, rolling STEPS or STAIRS Daily Assessment Level of Assist : 0 (Not tested) BALANCE Daily Assessment WHEELCHAIR MOBILITY Daily Assessment LOWER EXTREMITY EXERCISES Daily Assessment Extremity: Both Exercise Type #1: Other (comment)(nustep x 10 minutes) Sets Performed: 1 Reps Performed: 10 Level of Assist: Supervision Assessment: Patient making good progress. Plan of Care: Continue with plan of care. Arianna Plush, PTA 
10/14/2019

## 2019-10-14 NOTE — PROGRESS NOTES
OT Daily Note Time In 1031 Time Out 1115 Subjective: \"That was good\" Agreeable to therapy. Pain:not indicated during session. Education:on following written instructions. Interdisciplinary Communication: Collaborated with COLE, Fermin Nagy and patient is making progress towards goals. Precautions: Other (comment), Seizure(fall risk, impaired cognition ) Strengthening/activity tolerance Daily Assessment Bilateral  used with 15lbs in \"I\", \"O\", in 2 sets of 20 to increase activity tolerance, strength, and coordination. Cognition Daily Assessment Completed large colored peg [address] [place] activity with focus on following instructions. 2 and 3 step written instructions for placing pegs in correct order. Attention is large deficits, he does have cognitive abilities, but is very inconsistent. Ended session: In recliner, all needs within reach.   
 
 
Prashant Murguia OTR/L

## 2019-10-14 NOTE — PROGRESS NOTES
OT WEEKLY PROGRESS NOTE Time In: 0700 Time Out: 5395 Mobility Score Comments Rolling 4: Supervision or touching A Side: Left Supine to Sit 4: Supervision or touching A Sit to Supine Sit to Stand 4: Supervision or touching A Transfer Assist  Transfer Type: ambulation Equipment: [address] [facility] Comments: 10' x 2 with CGA Activities of Daily Living Score Comments Eating 6: Independent Oral Hyigene 5: S/U or clean-up assist   
Bathing 4: Supervision or touching A Type of Shower: Shower Position: Standing PRN and Unsupported Sitting Adaptive  Equipment: Tub Transfer Bench and Dorrene Abdirahman Comments:   
Upper Body Dressing 5: S/U or clean-up assist Items Applied: Pullover Position: Unsupported Sitting Comments:   
Lower Body Dressing 5: S/U or clean-up assist Items Applied: Underwear and Elastic pants Position: Standing PRN and Unsupported Sitting Adaptive Equipment: N/A Comments: At tub bench after s/u and CGA Donning/[place] Footwear 5: S/U or clean-up assist Items Applied: Socks and Slip-on shoes Adaptive Equipment: N/A Toilet Transfer 4: Supervision or touching A Transfer Type: LPT Equipment: [address] [place] [address] Comments: [address] [facility] [address] Hygiene 4: Supervision or touching A Output:  
Comments:   
Education Plan of Care: Please see Care Plan for updated LTGs. Family Training:   Completed last week with spouse. Summary of Progress: Making good progress, cognition and safety awareness are high risks at home. Summary of Session: Did well with AdL and progress note, goals are appropriate and on track. Joaquin Taylor OT 
10/14/2019

## 2019-10-15 NOTE — DISCHARGE INSTRUCTIONS
Cardiovascular Discharge Instructions    Patient Discharge    Lluvia Ramos / [id number] : 1945    Admitted 2019 Discharged: 10/15/2019       When  [address]    If you have any of the following symptoms, call for emergency help immediately. The sooner you get help, the more doctors can do to prevent permanent damage. · Sudden weakness or numbness of the face, arm or leg on one side of the body   · Sudden dimness or loss of vision, particularly in one eye   · Loss of speech, trouble talking or understanding what others are saying   · Sudden severe headache with no known cause   · Unexplained dizziness, unstable walking or falling, especially along with any of the other symptoms    Diet    · You are on a low fat, low cholesterol, low sodium diet. You should continue this diet at home to help prevent future health problems. · Please contact your physician's office if you have any diet related questions. Medications    · It is important that you take the medication exactly as they are prescribed. · Keep your medication in the bottles provided by the pharmacist and keep a list of the medication names, dosages, and times to be taken in your wallet. · Do not take other medications without consulting your doctor. Personal Items and Belongings    The following personal items have been returned to you:    Valuables Screen  Clothing: None, Other (comment)(wife brought items in after patient arrived to room, patient had nothing upon transfer)  Computer: None  Dental Appliances: Uppers, Lowers, With patient  Home Medications: None  Jewelry: Ring, With patient  Luggage with Personal Belongings: None  Other Valuables: None  Personal Items Left at Patient Bedside: none presently  Personal Items Sent to Safe: none        Patient Education        Learning About Coronary Artery Disease (CAD)  What is coronary artery disease?     Coronary artery disease (CAD) occurs when plaque builds up in the arteries that bring oxygen-rich blood to your heart. Plaque is a fatty substance made of cholesterol, calcium, and other substances in the blood. This process is called hardening of the arteries, or atherosclerosis. What happens when you have coronary artery disease? · Plaque may narrow the coronary arteries. Narrowed arteries cause poor blood flow. This can lead to angina symptoms such as chest pain or discomfort. If blood flow is completely blocked, you could have a heart attack. · You can slow CAD and reduce the risk of future problems by making changes in your lifestyle. These include quitting smoking and eating heart-healthy foods. · Treatments for CAD, along with changes in your lifestyle, can help you live a longer and healthier life. How can you prevent coronary artery disease? · Do not smoke. It may be the best thing you can do to prevent heart disease. If you need help quitting, talk to your doctor about stop-smoking programs and medicines. These can increase your chances of quitting for good. · Be active. Get at least 30 minutes of exercise on most days of the week. Walking is a good choice. You also may want to do other activities, such as running, swimming, cycling, or playing tennis or team sports. · Eat heart-healthy foods. Eat more fruits and vegetables and less foods that contain saturated and trans fats. Limit alcohol, sodium, and sweets. · Stay at a healthy weight. Lose weight if you need to. · Manage other health problems such as diabetes, high blood pressure, and high cholesterol. How is coronary artery disease treated? · Your doctor will suggest that you make lifestyle changes. For example, your doctor may ask you to eat healthy foods, quit smoking, lose extra weight, and be more active. · You will have to take medicines. · Your doctor may suggest a procedure to open narrowed or blocked arteries. This is called angioplasty.  Or your doctor may suggest using healthy blood vessels to create detours around narrowed or blocked arteries. This is called bypass surgery. Follow-up care is a key part of your treatment and safety. Be sure to make and go to all appointments, and call your doctor if you are having problems. It's also a good idea to know your test results and keep a list of the medicines you take. Where can you learn more? Go to http://pamella-savage.info/. Enter [phone] in the search box to learn more about \"Learning About Coronary Artery Disease (CAD). \"  Current as of: April 9, 2019  Content Version: 12.2  © 5431-9551 [employer]. Care instructions adapted under license by [employer] (which disclaims liability or warranty for this information). If you have questions about a medical condition or this instruction, always ask your healthcare professional. [address] any warranty or liability for your use of this information. DISCHARGE SUMMARY from Nurse    PATIENT INSTRUCTIONS:    After general anesthesia or intravenous sedation, for 24 hours or while taking prescription Narcotics:  · Limit your activities  · Do not drive and operate hazardous machinery  · Do not make important personal or business decisions  · Do  not drink alcoholic beverages  · If you have not urinated within 8 hours after discharge, please contact your surgeon on call. Report the following to your surgeon:  · Excessive pain, swelling, redness or odor of or around the surgical area  · Temperature over 100.5  · Nausea and vomiting lasting longer than 4 hours or if unable to take medications  · Any signs of decreased circulation or nerve impairment to extremity: change in color, persistent  numbness, tingling, coldness or increase pain  · Any questions    What to do at Home:  Recommended activity: Activity as tolerated.       If you experience any of the following symptoms fever 101.5, nausea/vomiting, chills, weakness or shortness of breath, please follow up with MD.    *  Please give a list of your current medications to your Primary Care Provider. *  Please update this list whenever your medications are discontinued, doses are      changed, or new medications (including over-the-counter products) are added. *  Please carry medication information at all times in case of emergency situations. These are general instructions for a healthy lifestyle:    No smoking/ No tobacco products/ Avoid exposure to second hand smoke  Surgeon General's Warning:  Quitting smoking now greatly reduces serious risk to your health. Obesity, smoking, and sedentary lifestyle greatly increases your risk for illness    A healthy diet, regular physical exercise & weight monitoring are important for maintaining a healthy lifestyle    You may be retaining fluid if you have a history of heart failure or if you experience any of the following symptoms:  Weight gain of 3 pounds or more overnight or 5 pounds in a week, increased swelling in our hands or feet or shortness of breath while lying flat in bed. Please call your doctor as soon as you notice any of these symptoms; do not wait until your next office visit. The discharge information has been reviewed with the patient and spouse. The patient and spouse verbalized understanding. Discharge medications reviewed with the patient and spouse and appropriate educational materials and side effects teaching were provided.   ___________________________________________________________________________________________________________________________________

## 2019-10-15 NOTE — PROGRESS NOTES
OT DISCHARGE REPORT Time In: 0700 Time Out: 5117 Activities of Daily Living Score Comments Eating 6: Independent Oral Hyigene 5: S/U or clean-up assist sinkside Bathing 5: S/U or clean-up assist Type of Shower: Shower Position: Supported sitting and Standing PRN Adaptive  Equipment: Duong Coma Comments:   
Upper Body Dressing 5: S/U or clean-up assist Items Applied: Pullover Position: Unsupported Sitting Comments:   
Lower Body Dressing 4: Supervision or touching A Items Applied: Keiry Landsman Position: Standing PRN and Unsupported Sitting Adaptive Equipment: N/A Comments:   
Donning/[place] Footwear 5: S/U or clean-up assist Items Applied: Socks and Slip-on shoes Adaptive Equipment: N/A Comments: Toilet Transfer 4: Supervision or touching A Transfer Type: SPT Equipment: Voncille Stai Comments: Toileting Hygiene 5: S/U or clean-up assist Output:  
Comments:   
Education Plan of Care: Please see Care Plan for progress towards goals during stay Precautions at Discharge: Poor Safety Awareness and Impulsive Discharge Location: Home with spouse. Safety/Supervision Recommendations/Functional Level: 24/7 supervision. Family Training: Completed last week with spouse. Recommended Continuing Therapy: HHOT at d/c. Residual Deficits: impulsiveness, attention span, decreased cognition, balance Progress over LOS: Steady progress towards supervision/cga during ADL's. Carolyn Escoto OT  
10/15/2019

## 2019-10-15 NOTE — PROGRESS NOTES
Received order to discharge pt. Discharge/medication instructions reviewed with patient and spouse, verbalized understanding. Patient/spouse given discharge paperwork, including rx for ativan. Patient taken to discharge area via wheel chair.

## 2019-10-15 NOTE — PROGRESS NOTES
CASE MANAGEMENT DISCHARGE NOTE Discharge Destination: Home patient discharged with Home Health Discharge Date: 10/15/2019 DME: Wheelchair Home Health Agency: Patient agrees with [facility] Out Patient Facility:  None STR: None

## 2019-10-15 NOTE — DISCHARGE SUMMARY
REHABILITATION DISCHARGE SUMMARY Patient: Clearance Ace MRN: [id number]  SSN: [id number] YOB: 1945  Age: 66 y.o. Sex: male Date: 10/15/2019 Admit Date: 9/30/2019 Discharge Date: 10/15/2019 Admitting Physician: Mary Ellen Collins MD  
Primary Care Physician: Alan Felix MD  
 
Admission Condition: good 
 
  
Chief Complaint : Gait dysfunction secondary to below. Admit Diagnosis: TIA (transient ischemic attack) [G45.9] TIA (transient ischemic attack) (9/26/2019) Seizures ([address] (9/26/2019) History of CVA (cerebrovascular accident) (9/26/2019) HTN (hypertension) (9/26/2019) HLD (hyperlipidemia) (9/26/2019) CAD (coronary artery disease) (9/26/2019) History of prostatectomy (9/26/2019) History of prostate cancer (9/26/2019) 
left LE weakness 
weakness Pain DVT risk Acute Rehab Dx: LLE weakness.  
Mobility and ambulation deficits Self Care/ADL deficits 
  
 
HPI: Josh Coleman is a 66 y. o. male patient at [facility] was admitted on 9/26/2019  by Cornell Cabrera below mentioned medical history ,is being seen for Physical Medicine and Rehabilitation consult.   
  
Patient was admitted with diagnosis of acute CVA and started on medical management for acute stroke. MRI showed remote right frontal lobe infarct with encephalomalacia and hemosiderin staining and extensive white matter changes, likely chronic small vessel disease. No acute infarction was reported. However patient continues to demonstrate left leg weakness that is worse than baseline.  Patient noted to have severely elevated, fluctuating BP, managed closely. Patient is evaluated by neurology, who feel this may be secondary to seizures. Electroencephalogram reveals active ictal tendency in the right hemisphere.  Patient is started on phenytoin, in addition to [address] per Angie Langley MD. Neurology to follow to make adjustments for anti seizure medications, possibly changing keppra to zonisamide. Patient has started to participate in therapies with acute PT, OT and ST. Patient shows significant left leg weakness, limiting his mobility, ambulation and ADLs. Per PT/OT he is reported to bee leaning heavily to the left at times, reports his left leg feels much weaker than normal. He is reported to be incontinent of bowel and bladder. Patient noted to be more confused, less verbal, more flat, showed less visual tracking & was unable to take steps compared to yesterday walking  
  
Patient still Patient is managing his transfers and gait with minimal/ moderate assist of 2.   
  
Our service was consulted for rehab needs and we recommended inpatient hospital rehabilitation is reasonable and necessary due to ongoing acute medical issues which have become worse since initial pre-admission evaluation. Patient now requires moderate assist  Of 2 for transfers and ambulation. Patient noted to be more confused. I reviewed the preadmission screening and have approved for admission to the [facility]. The patient was cleared for transfer to rehab today. Patient continues to have ongoing  medical issues outlined above requiring physician medical supervision and functional deficits which would benefit from continued intensive therapies.    
  
Rehabiitation Course:  
Patient was admitted to acute medical rehab unit continued on medical management for CVA, seizure disorder. Patient was started on Dilantin in addition to [address] for evidence of Seizure per Dr. Angy Hays, neurologist. Dilantin levels were monitored at the time quite not therapeutic level. However patient has not had any signs of focal seizures. In addition patient remains on Procardia and therefore doses will not be increased at this time. Patient will follow up with neurologist for further dosing or weaning. Patient remains on multiple antihypertensive to control blood pressure at this time fairly controlled. Functionally patient has made some gains in mobility transfers gate however still requires contact guard assist to supervision for mobility and ADLs. Patient is not safe to be driving. This relate to patient And wife. Patient requires supervision for safety. At discharge patient will continue PT 0T and speech therapy in the community. Patient is in stable condition or home discharge. Medications reviewed. Home Architecture: Home Situation Home Environment: Private residence (10/01/19 1049) # Steps to Enter: 1 (10/01/19 1049) Rails to Enter: No (09/30/19 1500) Wheelchair Ramp: No (10/01/19 1049) One/Two Story Residence: Two story, live on 1st floor (10/01/19 1049) Lift Chair Available: No (10/01/19 1049) Living Alone: No (10/01/19 1049) Support Systems: Spouse/Significant Other/Partner; Child(spencer) (10/01/19 1049) Patient Expects to be Discharged to[de-identified] Unknown (10/01/19 1049) Current DME Used/Available at Home: Walker, rolling;Walker, rollator;Cane, quad (10/01/19 1049) Tub or Shower Type: Shower(has doors and built in seat ) (10/01/19 1049) Past Medical History:  
Diagnosis Date  Arthritis  CAD (coronary artery disease)  Hypertension  [facility] [address]  Stroke ([facility] Past Surgical History:  
Procedure Laterality Date  CARDIAC SURG PROCEDURE UNLIST    
 one stent  HX ORTHOPAEDIC    
 knee surgeries No family history on file. Social History Tobacco Use  Smoking status: Never Smoker  Smokeless tobacco: Never Used Substance Use Topics  Alcohol use: Not Currently No Known Allergies Prior to Admission medications Medication Sig Start Date End Date Taking? Authorizing Provider  
esomeprazole (NEXIUM) 40 mg capsule Take 40 mg by mouth daily. Yes Provider, Historical  
L-Mfolate-B6 Phos-Methyl-B12 (METANX) 3-35-2 mg tab tab Take 1 Tab by mouth two (2) times a day.    Yes Provider, Historical  
 PARoxetine CR (PAXIL CR) 25 mg tablet Take 25 mg by mouth daily. Yes Provider, Historical  
levETIRAcetam (KEPPRA) 500 mg tablet Take 1,000 mg by mouth two (2) times a day. Yes Provider, Historical  
NIFEdipine ER (ADALAT CC) 60 mg ER tablet Take 60 mg by mouth two (2) times a day. Yes Provider, Historical  
lisinopril (PRINIVIL, ZESTRIL) 20 mg tablet Take 20 mg by mouth two (2) times a day. Yes Provider, Historical  
atorvastatin (LIPITOR) 20 mg tablet Take 20 mg by mouth daily. Yes Provider, Historical  
donepezil (ARICEPT) 10 mg tablet Take 10 mg by mouth daily. Yes Provider, Historical  
magnesium oxide (MAG-OX) 400 mg tablet Take 400 mg by mouth three (3) times daily. Yes Provider, Historical  
multivitamin (ONE A DAY) tablet Take 1 Tab by mouth daily. Provider, Historical  
potassium chloride SR (KLOR-CON 10) 10 mEq tablet Take 20 mEq by mouth daily. Provider, Historical  
 
 
Current Medications: 
Current Facility-Administered Medications Medication Dose Route Frequency Provider Last Rate Last Dose  lisinopril (PRINIVIL, ZESTRIL) tablet 40 mg  40 mg Oral DAILY Malinda Zavaleta MD   40 mg at 10/15/19 3481  LORazepam (ATIVAN) tablet 0.5 mg  0.5 mg Oral Q6H PRN Malinda Zavaleta MD   0.5 mg at 10/14/19 2110  
 NIFEdipine ER (PROCARDIA XL) tablet 60 mg  60 mg Oral BID Gabrielle Colin MD   60 mg at 10/15/19 4914  hydroCHLOROthiazide (HYDRODIURIL) tablet 25 mg  25 mg Oral DAILY Gabrielle Colin MD   25 mg at 10/15/19 9669  acetaminophen (TYLENOL) tablet 650 mg  650 mg Oral Q4H PRN Malinda Zavaleta MD   650 mg at 10/02/19 2121  aspirin chewable tablet 81 mg  81 mg Oral DAILY Malinda Zavaleta MD   81 mg at 10/15/19 6186  bisacodyl (DULCOLAX) tablet 5 mg  5 mg Oral DAILY PRN Malinda Zavaleta MD   5 mg at 10/03/19 4010  sodium chloride (NS) flush 5-40 mL  5-40 mL IntraVENous PRN Malinda Zavaleta MD      
  heparin (porcine) injection 5,000 Units  5,000 Units SubCUTAneous Q8H Zahraa Verduzco MD   5,000 Units at 10/15/19 0630  
 donepezil (ARICEPT) tablet 5 mg  5 mg Oral DAILY Zahraa Verduzco MD   5 mg at 10/15/19 5382  levETIRAcetam (KEPPRA) tablet 1,000 mg  1,000 mg Oral BID Parish WEISS MD   1,000 mg at 10/15/19 0630  
 magnesium oxide (MAG-OX) tablet 400 mg  400 mg Oral TID Zahraa Verduzco MD   400 mg at 10/15/19 5691  PARoxetine CR (PAXIL-CR) tablet 25 mg (Patient Supplied)  25 mg Oral DAILY Zahraa Verduzco MD   25 mg at 10/15/19 4055  phenytoin ER (DILANTIN ER) ER capsule 200 mg  200 mg Oral BID Parish WEISS MD   200 mg at 10/15/19 0831  
 traZODone (DESYREL) tablet 25 mg  25 mg Oral QHS PRN Zahraa Verduzco MD   25 mg at 10/14/19 2110  
 guaiFENesin (ROBITUSSIN) 100 mg/5 mL oral liquid 100 mg  100 mg Oral Q6H PRN Zahraa Verduzco MD   100 mg at 10/14/19 2111  
 atorvastatin (LIPITOR) tablet 40 mg  40 mg Oral QHS Zahraa Verduzco MD   40 mg at 10/14/19 2110 Review of Systems: Denies chest pain, shortness of breath, cough, headache, visual problems, abdominal pain, dysurea, calf pain. Pertinent positives are as noted in the medical records and unremarkable otherwise. Vital Signs:  
Patient Vitals for the past 8 hrs: 
 BP Temp Pulse Resp SpO2  
10/15/19 0815 121/70 98.2 °F (36.8 °C) 61 14 98 % Physical Exam:  
     
General:  Alert. No acute distress. [place]- hearing aids in ears. HEENT:  Normocephalic, EOMI. No JVD. Lungs:  CTA no wheezing Heart:  Regular rate and rhythm, S1, S2, No obvious murmur Genitourinary: defered Abdomen:  Soft, NT/ND, BS+ Neuromuscular:  PERRL, EOMI 
+ mild generalized weakness BUE, BLE major muscle groups. .  
Sensory - intact grossly 
   
Skin:  calf NT. Edema: none  
  
 
Lab Review: No results found for this or any previous visit (from the past 72 hour(s)). PT Initial  PT Most Recent Amount of Assistance: 1 (Dependent/total assistance) (10/01/19 1200) 4 (Minimal assistance) (10/14/19 1239) Distance (ft): 8 Feet (ft) (09/30/19 1700) 250 Feet (ft) (10/14/19 1300) Assistive Device: Other (comment)(parallel bars, gait belt) (09/30/19 1700) Walker, rolling (10/14/19 1300) OT Initial OT Most Recent Grooming Assistance : 5 (Stand-by assistance) (10/03/19 1318) 5 (Stand-by assistance) (10/03/19 1318) ST Initial ST Most Recent Aspiration Signs/Symptoms: None (10/01/19 1103) None (10/01/19 1103) Active Problems: 
  History of CVA (cerebrovascular accident) (9/26/2019) CAD (coronary artery disease) (9/26/2019) Weakness (10/1/2019) Encephalopathy (10/1/2019) Seizure disorder ([facility] (10/1/2019) Impaired functional mobility, balance, gait, and endurance (10/1/2019) Condition on discharge :  good Rehabilitation  potential : Good Discharge Instructions: 
Seizures/ TIA/ encephalopathy 
- keppra 
- added Phenytoin ER. Anna Faustin measure level in 1 week. - 10/1 monitor. Seizure precautions. - 10/2 - continue Keppra, dilantin. 10/7 - dilantin sub therapeutic. However will keep current dose. Recheck on Friday. 10/12 - Continue keppra and dilantin , level slight higher, yet mildly sub therpeutic level. Will conitue current dose checkl evel as pout pt.  
10/15 - no new seizures, pt will follow up with neurology regarding seizure treatment.  
  
History of CVA (cerebrovascular accident) (9/26/2019) 
- left LE weakness 
- lipitor 
- aspirin 81 
- aricept - prior R frontal hemorrahagic CVA. 10/11 - +cognitive deifits. Remains a major barrier, requires cure for ADLs. - continue antiplatelet therapy, secondary prevention. No new motor/ sensory deficits.  
  
UTI - + pseudomonas, sensitive to cipro, start po cipro x 5 days.   
- 10/8 -Treatment finished.  
  
Hypertension -  
 - continue lisinopril, procardia XL, HCTZ.  
  
HLD (hyperlipidemia) (9/26/2019) 
- lipitor 
  
CAD (coronary artery disease)  
- cardiac precaution.  .  
  
  
DVT risk / DVT Prophylaxis-   
10/14 - no s/s of DVT. Continue SCDs.  
  
History of prostatectomy/ History of prostate cancer (9/26/2019)/ Urinary retention/ neurogenic bladder --  
scheulde voids q2h. As pt incontinent. - Check post-void residual every shift; In and Out catheterize if post-void residual is more than 400 cc. 
- incontinent. Continue to time voids as best can. - occasionally incontinent. chronic issue 
  
  
bowel program - continent. 
  
Patient seen and examined for a manual wheelchair evaluation in [facility]. The patient has diagnosis of prior CVA, encephalopathy,  BLE weakness, impaired balance that causes gait impairment, mobility limitations in the home that significantly impairs the ability to participate in mobility - related activities of daily living. The use of light weight manual wheelchair will significantly improve the ability to participate in MRADLs and will be able to use it at home on a regular basis. Follow up with neurology, Dr. Lucie Han MD, pcp per instructions. Follow up with Dr. Johnny Velazquez MD in 4 weeks. Discharge Medications: 
 
 
  
   
  
  
 
 
hydroCHLOROthiazide (HYDRODIURIL) tablet 25 mg    Ordered Dose: 25 mg Route: Oral Frequency: daily. Take with food or milk or full glass of water. aspirin delayed-release tablet 81 mg Ordered Dose: 81 mg Route: Oral Frequency: daily. Take with food or milk or full glass of water. senna-docusate (PERICOLACE) 8.6-50 mg per tablet 2 Tab Ordered Dose: 2 Tab Route: Oral Frequency: DAILY Current Discharge Medication List  
  
CONTINUE these medications which have NOT CHANGED Details  
esomeprazole (NEXIUM) 40 mg capsule Take 40 mg by mouth daily. L-Mfolate-B6 Phos-Methyl-B12 (METANX) 3-35-2 mg tab tab Take 1 Tab by mouth two (2) times a day. PARoxetine CR (PAXIL CR) 25 mg tablet Take 25 mg by mouth daily. levETIRAcetam (KEPPRA) 500 mg tablet Take 1,000 mg by mouth two (2) times a day. NIFEdipine ER (ADALAT CC) 60 mg ER tablet Take 60 mg by mouth two (2) times a day. lisinopril (PRINIVIL, ZESTRIL) 20 mg tablet Take 20 mg by mouth two (2) times a day. atorvastatin (LIPITOR) 20 mg tablet Take 20 mg by mouth daily. donepezil (ARICEPT) 10 mg tablet Take 10 mg by mouth daily. magnesium oxide (MAG-OX) 400 mg tablet Take 400 mg by mouth DAILY times daily. multivitamin (ONE A DAY) tablet Take 1 Tab by mouth daily. potassium chloride SR (KLOR-CON 10) 10 mEq tablet Take 20 mEq by mouth daily. Discharge time: 35 minutes. Signed By: Rip Ponce MD   
 October 15, 2019

## 2019-10-15 NOTE — PROGRESS NOTES
DISCHARGE SUMMARY 
 
 
 10/15/19 5748 Time Spent With Patient Time In 0907 Time Out 6788 Patient Seen For: AM  
Mental Status Neurologic State Alert Orientation Level Oriented to person Cognition Decreased attention/concentration;Memory loss Perseveration Perseverates during conversation Safety/Judgement Home safety Comprehension (Native Language) Primary Mode of Comprehension Auditory Score 4 Expression (Native Language) Primary Mode of Expression Verbal  
Score 4 Social Interaction/Pragmatics Score 4 Problem Solving Score 2 Memory Score 2  
 
 10/15/19 0956 Auditory Comprehension Auditory Impairment Yes Two-Step Basic Commands (%) 60 % independently 80% with repetitions 10/15/19 0957 Verbal Expression Initiation Impaired (%) Naming Impaired; answering wh- questions regarding personal interests/immediate needs/wants Responsive (%) 50 %; topic perseverations 10/15/19 1591 Attention Exercises Performed Accuracy (%) 80 % basic matching shapes 1:4  
Neuro-Linguistic Exercises Attention  Impaired Patient participated with basic cognitive therapy. Patient just woke up but was willing to participate. Followed basic 2 step directions with 60% accuracy and 80% with repetitions provided. Responsive naming to answer wh- questions regarding personal interests/hobbies with 50% accuracy. Decreased attention with topic perseverrations noted. Basic sustained attention task to match shapes in a field of 4 completed with ModA initially without a visual marker and Yulia with use of a visual marker to limit distractions. Discussed cognitive deficits with patient's wife. Instructed to provide choices rather then asking open ended questions, limit distractions, and provide instructions concisely one at a time due to impaired processing and attention. Patient will required 24/7 care at home [facility] scheduled. Naz Lester MS, CCC-SLP

## 2019-10-15 NOTE — PROGRESS NOTES
PSYCHOLOGY PROGRESS NOTE Name:  Mraia Guadalupe Morgan Date of Service: 10/15/2019 Location of Service:   906 Type of Service: Individual Psychotherapy Duration:  60 minutes Primary Diagnosis: 1. History of prostate cancer 2. History of prostatectomy 3. Seizures ([facility] [address] 4. Status epilepticus ([facility] [address] 5. Coronary artery disease involving native coronary artery of native heart, angina presence unspecified 6. Encephalopathy 7. History of CVA (cerebrovascular accident) Summary of Service:  Provided supportive psychological services to patient and spouse who was at bedside. Employed strategic psychoeducation to assist in caregiver burnout management and patient's understanding. Introduced details on post-discharge behavioral tactics and mechanisms the patient's caregiver can employ to reduce associated stressors. Marleni Gomez Psy.D. Psychologist 
 
No flowsheet data found.

## 2019-10-15 NOTE — PROGRESS NOTES
PHYSICAL THERAPY DISCHARGE SUMMARY 
TIME IN 1300 TIME OUT 1344 Precautions at discharge:  FALL PRECAUTIONS, 24 hour supervision due to decreased safety awareness Problem List:   
Decreased strength B LE  [x] Decreased strength trunk/core  [x] Decreased AROM   [] Decreased PROM  [] Decreased balance sitting  [x] Decreased balance standing  [x] Decreased endurance  [x] Pain  [] Functional Limitations:  
Decreased independence with bed mobility  [] Decreased independence with functional transfers  [x] Decreased independence with ambulation  [x] Decreased independence with stair negotiation  [x] Outcome Measures: /73 HR 60 Cognition: Orientation:A+O x 2 Communication:able to express needs verbally, able to follow single step commands with cueing Social Interaction:cooperating, appropriate with PT, participating, motivated to improve Problem Solving:difficulty with managing body mechanics during functional mobility due to balance deficits, decreased safety awareness and left neglect Memory:cues to recall safe body mechanics during functional mobility Pain level:no c/o pain Pain location:NA Pain interventions:NA Patient education:Balance  training,transfer training, gait training, fall precautions, activity pacing,body mechanics, w/c mobility and parts management, Patient verbalizing understanding and demonstrating partial understanding of patient education. Recommend follow up education with [place] PT Interdisciplinary Communication:spoke with RN case manger and COLE regarding D/C plans and DME. Spoke with RN regarding patient being incontinent of urine during treatment requiring mod assist for changing clothes and hygiene MMT Initial Asssessment Right Lower Extremity Left Lower Extremity Hip Flexion 3- 3-  
Knee Extension 5 5 Knee Flexion 4- 4- Ankle Dorsiflexion 2- 2-  
 
 MMT Discharge Assessment Right Lower Extremity Left Lower Extremity Hip Flexion  4  4 Knee Extension  5  5 Knee Flexion  4+  4+ Ankle Dorsiflexion  5  5  
0/5 No palpable muscle contraction 1/5 Palpable muscle contraction, no joint movement 2-/5 Less than full range of motion in gravity eliminated position 2/5 Able to complete full range of motion in gravity eliminated position 2+/5 Able to initiate movement against gravity 3-/5 More than half but not full range of motion against gravity 3/5 Able to complete full range of motion against gravity 3+/5 Completes full range of motion against gravity with minimal resistance 4-/5 Completes full range of motion against gravity with minimal-moderate resistance 4/5 Completes full range of motion against gravity with moderate resistance 4+/5 Completes full range of motion against gravity with moderate-maximum resistance 5/5 Completes full range of motion against gravity with maximum resistance AROM: bilateral LE generally decreased, functional 
 
PRIMARY MODE OF LOCOMOTION: ambulation Please see IRC Interdisciplinary Eval: Coordination/Balance Section for details regarding FIM score description. BED/CHAIR/WHEELCHAIR TRANSFERS Initial Assessment Discharge Assessment Rolling Right 4 (Minimal assistance)  Modified independent Rolling Left 4 (Minimal assistance)  Modified independent Supine to Sit 3 (Moderate assistance)  Modified independent Sit to Stand Moderate assistance  SBA to min assist with cues Sit to Supine 3 (Moderate assistance)  modified independent Transfer Type SPT without device  SPT with RW and without d device Comments Increased time and effort to complete bed mobility and transfers. Patient using bed rail for rolling. Patient demonstrating left neglect with possible visual field deficit. Unable to assess vision due to confusion.  Patient slow to respond to 1 step commands and requires multiple cues to complete bed mobility and transfers. SLow to sefl correct sitting balance and unable to correct without assistance   Increased time and effort to complete with cues for body mechanics, cues to attend to left side Car Transfer Not tested  Min assist with multiple cues Car Type rehab car  rehab car [facility] MOBILITY/MANAGEMENT Initial Assessment Discharge Assessment Able to Propel 0 feet  150, using LEs and UEs Functional Level 0  4 Curbs/ramps assistance required 0 (Not tested)  NA Wheelchair set up assistance required 0 (Not tested)  Min assist with cues Wheelchair management    manages left brake and right brake with cues WALKING INDEPENDENCE Initial Assessment Discharge Assessment Assistive device Other (comment)(parallel bars, gait belt)  RW Ambulation assistance - level surface 4 (Minimal assistance)  SBA to min assist with multiple cues Distance 8 Feet (ft)  250 Comments slow shuffling gait with narrow base of support, excessive hip ext rot and balance offset posterior. Slow to respond to commands with tendency to be easily distracted. cont step through gait pattern with ataxia. Tendency to keep RW too far in front. Easily distracted  With tendency to neglect left side and cues to avoid running into objects on left Ambulation assistance - unlevel surface 0 (Not tested)  up/down 20 ft ramp with RW and SBA to min assist and cues STEPS/STAIRS Initial Assessment Discharge Assessment Steps/Stairs ambulated 0  12 Rail Use Both  both Functional Level 0  4 Comments Unable to ambulate up/down steps due to cognitive status and impaired balance  Slow reciprocating pattern going up/down steps with cues for safe foot placement and cues to control gait speed up/down steps Curbs/Ramps 0 (Not tested)  up/down 20 ft ramp with RW and SBA to min assist with cues to control gait speed and RW placement. Tendency to keep RW to far in front QUALITY INDICATOR ASSIST COMMENTS Roll right (&return to back) 6: Independent Roll left (& return to back) 6: Independent Supine to sit 4: Supervision or touching A Sit to stand 4: Supervision or touching A Chair/bed-to-chair transfer 4: Supervision or touching A Walk 10 feet 3: Partial/Moderate A Walk 50 feet with 2 turns 3: Partial/Moderate A Walk 150 feet 3: Partial/Moderate A Walk 10 feet on uneven  3: Partial/Moderate A   
1 step/curb 3: Partial/Moderate A   
4 steps 3: Partial/Moderate A   
12 steps 3: Partial/Moderate A  object 4: Supervision or touching A Wheel 50' w/2 turns 4: Supervision or touching A Wheel 150' 4: Supervision or touching A Car Transfer 3: Partial/Moderate A PHYSICAL THERAPY PLAN OF CARE 
 
LTGs: patient met 3 out of  5 goals per eval, refer to care plan for details Pt would benefit from continued skilled physical therapy in order to improve independent functional mobility within the home with use of least restrictive device. Interventions may include range of motion (AROM, PROM B LE/trunk), motor function (B LE/trunk strengthening/coordination), activity tolerance (vitals, oxygen saturation levels), bed mobility training, balance activities, gait training (progressive ambulation program), and functional transfer training. Pt to be discharged 10/15/19 with assistance  provided by wife . Family training completed with wife Therapy Recommendations upon discharge:   24 hour supervision, [facility] PT Equipment needs at discharge:    patient owns a RW, [employer] to provide a wheelchair Please see IRC; Interdisciplinary Eval, Care Plan, and Patient Education for further information regarding physical therapy discharge summary and plan of care. Patient returned to room at end of treatment. Patient supine in bed with head of bed elevated and bed rails up x 2. Needs placed in reach of patient.  
Deshawn alarm on 
 
Radha Morales, PT 
 10/15/2019

## 2019-12-03 PROBLEM — G93.40 ENCEPHALOPATHY: Status: RESOLVED | Noted: 2019-01-01 | Resolved: 2019-01-01

## 2019-12-03 PROBLEM — R53.83 FATIGUE: Status: ACTIVE | Noted: 2019-01-01

## 2019-12-03 PROBLEM — G93.49 ENCEPHALOPATHY CHRONIC: Status: ACTIVE | Noted: 2019-01-01

## 2019-12-03 PROBLEM — F01.518 VASCULAR DEMENTIA WITH BEHAVIOR DISTURBANCE: Status: ACTIVE | Noted: 2019-01-01

## 2019-12-06 NOTE — PROGRESS NOTES
Jabari Mckeon  : 1945  [place]  Secondary[place] [facility] [place] at [place] [address] [facility], [address] [place] [address],[address], [address] [facility]  [phone]   [phone]        OUTPATIENT PHYSICAL THERAPY: Daily Treatment Note 2019  Visit Count:  1    ICD-10: Treatment Diagnosis:   · Other abnormalities of gait and mobility (R26.89)  · Difficulty in walking, not elsewhere classified (R26.2)   Precautions/Allergies:   Patient has no known allergies. TREATMENT PLAN:  Effective Dates: 2019 TO 3/5/2020 (90 days). Frequency/Duration: 2 times a week for 90 Day(s)    Pre-treatment Symptoms/Complaints:  2019: Patient reports he is doing ok right now. Pain: Initial: Pain Intensity 1: 0  Post Session:  0/10   Medications Last Reviewed:  2019  Updated Objective Findings:  See evaluation note from today  TREATMENT:     THERAPEUTIC EXERCISE: (15 minutes):  Exercises per grid below to improve mobility, strength, balance and coordination. Required minimal visual, verbal and manual cues to promote proper body alignment, promote proper body posture and promote proper body mechanics. Progressed resistance, range, repetitions and complexity of movement as indicated. Date:  2019   Activity/Exercise Parameters   Sit-to-stand with proper hand position for safety HEP      Treatment/Session Summary:    · Response to Treatment:  Patient tolerated assessment without complaints of increased imbalance or fatigue. Patient verbalized and demonstrated understanding of HEP. · Communication/Consultation:  None today  · Equipment provided today:  None today  · Recommendations/Intent for next treatment session: Next visit will focus on improving overall mobility and safety.     Total Treatment Billable Duration:  15 minutes + evaluation  PT Patient Time In/Time Out  Time In: 1300  Time Out: [address], PT    Future Appointments   Date Time Provider [facility]   12/10/2019 10:15 AM Anupam Lynn, PT SFEORPT SFE   12/12/2019  1:00 PM Anupam Lynn, PT SFEORPT SFE   12/13/2019  2:20 PM Gloria Dillon MD BSND BSND   12/17/2019 11:00 AM Anupam Lynn, PT SFEORPT SFE   12/20/2019  1:00 PM Anupam Lynn, PT SFEORPT SFE   12/23/2019 11:00 AM Anupam Lynn, PT SFEORPT SFE   2/11/2020  1:30 PM Paty Arnold MD SSA PMR PMR   3/3/2020  1:15 PM Italia Najera MD SSA [facility] IMANI

## 2019-12-06 NOTE — THERAPY EVALUATION
Yuli Donato  : 1945  [place]  [place] [address] [place] [address] at [address] [place] [address],[address], [place] [address].  [phone]   [phone]          OUTPATIENT PHYSICAL THERAPY:Initial Assessment 2019   ICD-10: Treatment Diagnosis:    Other abnormalities of gait and mobility (R26.89)   Difficulty in walking, not elsewhere classified (R26.2)   Precautions/Allergies:   Patient has no known allergies. TREATMENT PLAN:  Effective Dates: 2019 TO 3/5/2020 (90 days). Frequency/Duration: 2 times a week for 90 Day(s) MEDICAL/REFERRING DIAGNOSIS:  Seizure ([address] [R56.9]   DATE OF ONSET:   REFERRING PHYSICIAN: Yesenia Davis MD MD Orders: Evaluate and Treat  Return MD Appointment: TBD     INITIAL ASSESSMENT:  Mr. Enmanuel Solis presents with decreased mobility and decreased safety with ambulation. After discussing with patient, he agreed he would benefit from physical therapy to improve above deficits. Please sign this plan of treatment if you concur. Thank you for the opportunity to serve this patient. PROBLEM LIST (Impacting functional limitations):  1. Decreased ADL/Functional Activities  2. Decreased Transfer Abilities  3. Decreased Ambulation Ability/Technique  4. Decreased Balance  5. Decreased Activity Tolerance INTERVENTIONS PLANNED: (Treatment may consist of any combination of the following)  1. Balance Exercise  2. Gait Training  3. Home Exercise Program (HEP)  4. Neuromuscular Re-education/Strengthening  5. Range of Motion (ROM)  6. Therapeutic Activites  7. Therapeutic Exercise/Strengthening     GOALS: (Goals have been discussed and agreed upon with patient.)  Short-Term Functional Goals: Time Frame: 30 days  1. Patient will be independent with home exercise program without exacerbation of symptoms or cueing needed. Discharge Goals: Time Frame: 90 days  1.  Patient will be independent with all ADLs with minimal fear of falling and loss of balance with daily tasks. 2. Patient will report no fear avoidance with social or recreational activities due to fear of falling. 3. Patient will score greater than or equal to 45/56 on Bonilla Balance Scale with minimal effect of imbalance or fatigue on patient's ability to manage every day life activities. 4. Patient will score less than or equal to 15 seconds on Timed Up and Go with minimal effect of imbalance or fatigue on patient's ability to manage every day life activities. OUTCOME MEASURE:   Tool Used: Bonilla Balance Scale  Score:  Initial: 24/56 Most Recent: X/56 (Date: -- )   Interpretation of Score: Each section is scored on a 0-4 scale, 0 representing the patients inability to perform the task and 4 representing independence. The scores of each section are added together for a total score of 56. The higher the patients score, the more independent the patient is. Any score below 45 indicates increased risk for falls. Tool Used: Timed Up and Go (TUG)  Score:  Initial: 25 seconds Most Recent: X seconds (Date: -- )   Interpretation of Score: The test measures, in seconds, the time taken by an individual to stand up from a standard arm chair (seat height 46 cm [18 in], arm height 65 cm [25.6 in]), walk a distance of 3 meters (118 in, approx 10 ft), turn, walk back to the chair and sit down. If the individual takes longer than 14 seconds to complete TUG, this indicates risk for falls. MEDICAL NECESSITY:   · Patient is expected to demonstrate progress in range of motion, balance, coordination and functional technique to improve safety during daily activities. · Patient demonstrates good rehab potential due to higher previous functional level. · Skilled intervention continues to be required due to decreased mobility.   REASON FOR SERVICES/OTHER COMMENTS:  · Patient continues to demonstrate capacity to improve overall functional mobility which will increase independence and increase safety. Total Duration:  PT Patient Time In/Time Out  Time In: 1300  Time Out: 1345    Rehabilitation Potential For Stated Goals: Good  Regarding [facility] therapy, I certify that the treatment plan above will be carried out by a therapist or under their direction. Thank you for this referral,  Ghulam Mcleod, PT     Referring Physician Signature: Zakiya Duong MD _______________________________ Date _____________     PAIN/SUBJECTIVE:   Initial: Pain Intensity 1: 0  Post Session:  0/10   HISTORY:   History of Injury/Illness (Reason for Referral):  Patient reports that he had a stoke in 2018. His wife reports that he was doing well for a while, but then he started having continuous seizures. She reports that he was then hospitalized for 3 day before going to rehab at [address] [place] [address] for 6 weeks. He reports that he is doing better now. His wife reports that he is still strong, but he has little to no endurance with activities. She reports he also has been falling because he forgets to take his walker with him. She reports she would like for him to be safer with all activities. He reports  He would like to not fall again. Past Medical History/Comorbidities:   Mr. Bentley Phillip  has a past medical history of Anemia, Arthritis, Autoimmune disease ([facility] CAD (coronary artery disease), Cancer ([facility] (2009), Hypertension, Seizures ([facility] and Stroke ([facility] Mr. Bentley Phillip  has a past surgical history that includes pr cardiac surg procedure unlist; hx orthopaedic; hx prostatectomy (2009); hx appendectomy; and hx colonoscopy (2018). Social History/Living Environment:   Home Environment: Private residence  Living Alone: No  Support Systems: Family member(s), Friends \ neighbors, Spouse/Significant Other/Partner  Prior Level of Function/Work/Activity:  Independent  Dominant Side:         RIGHT  Personal Factors:          Sex:  male        Age:  66 y.o.    Ambulatory/Rehab Services H2 Model Falls Risk Assessment   Risk Factors:       (4)  Confusion/Disorientation/Impulsivity       (1)  Gender [Male]       (5)  History of Recent Falls [w/in 3 months] Ability to Rise from Chair:       (1)  Pushes up, successful in one attempt   Falls Prevention Plan:       No modifications necessary   Total: (5 or greater = High Risk): 11   ©2010 [facility] . [facility] [employer] Patent #[id number]. Federal Law prohibits the replication, distribution or use without written permission from [facility] [employer]   Current Medications:       Current Outpatient Medications:     PARoxetine CR (PAXIL CR) 37.5 mg tablet, Take 37.5 mg by mouth daily. , Disp: , Rfl:     LORazepam (ATIVAN) 0.5 mg tablet, Take 1 Tab by mouth every eight (8) hours as needed (Panic attack). Max Daily Amount: 1.5 mg. Indications: anxious, Disp: 30 Tab, Rfl: 1    QUEtiapine (SEROQUEL) 25 mg tablet, Take 1 Tab by mouth daily as needed for Other (agitation for safety) for up to 30 days. , Disp: 30 Tab, Rfl: 2    QUEtiapine (SEROQUEL) 50 mg tablet, Take 1 Tab by mouth daily for 90 days. , Disp: 30 Tab, Rfl: 2    levETIRAcetam (KEPPRA) 500 mg tablet, Take 1 Tab by mouth two (2) times a day for 60 days. , Disp: 60 Tab, Rfl: 1    diphenhydrAMINE-acetaminophen (TYLENOL PM EXTRA STRENGTH)  mg tab, Take 2 Tabs by mouth daily as needed for Pain., Disp: , Rfl:     hydroCHLOROthiazide (HYDRODIURIL) 25 mg tablet, Take 1 Tab by mouth daily. Hold for systolic less than 958. Check BP every am  Indications: high blood pressure, Disp: 30 Tab, Rfl: 1    atorvastatin (LIPITOR) 40 mg tablet, Take 1 Tab by mouth nightly., Disp: 30 Tab, Rfl: 2    magnesium oxide (MAG-OX) 400 mg tablet, Take 1 Tab by mouth daily. , Disp: 30 Tab, Rfl: 1    aspirin 81 mg chewable tablet, Take 1 Tab by mouth daily. , Disp: 30 Tab, Rfl: 1    phenytoin ER (DILANTIN ER) 200 mg ER capsule, Take 200 mg by mouth two (2) times a day.  Indications: a type of seizure called tonic-clonic epilepsy, Disp: 60 Cap, Rfl: 2    potassium chloride SR (KLOR-CON 10) 10 mEq tablet, Take 1 Tab by mouth daily. (Patient taking differently: Take 1 Tab by mouth daily. Hold potassium until BMP drawn and results reviewed by Dr. Cain Painter), Disp: 30 Tab, Rfl: 1    esomeprazole (NEXIUM) 40 mg capsule, Take 40 mg by mouth as needed for Other (heartburn). , Disp: , Rfl:     L-Mfolate-B6 Phos-Methyl-B12 (METANX) 3-35-2 mg tab tab, Take 1 Tab by mouth two (2) times a day., Disp: , Rfl:     NIFEdipine ER (ADALAT CC) 60 mg ER tablet, Take 60 mg by mouth two (2) times a day., Disp: , Rfl:     lisinopril (PRINIVIL, ZESTRIL) 20 mg tablet, Take 20 mg by mouth two (2) times a day., Disp: , Rfl:     donepezil (ARICEPT) 10 mg tablet, Take 10 mg by mouth daily. , Disp: , Rfl:     multivitamin (ONE A DAY) tablet, Take 1 Tab by mouth daily. , Disp: , Rfl:    Date Last Reviewed:  12/6/2019    Number of Personal Factors/Comorbidities that affect the Plan of Care: 1-2: MODERATE COMPLEXITY   EXAMINATION:   Observation/Orthostatic Postural Assessment:          Posture Assessment: Rounded shoulders, Forward head   Functional Mobility:         Gait/Mobility:    · Base of Support: Center of gravity altered  · Speed/Henna: Pace decreased (<100 feet/min)  · Step Length: Left shortened, Right shortened  · Swing Pattern: Left symmetrical, Right symmetrical  · Stance: Left increased, Right increased  · Gait Abnormalities: Antalgic  · Ambulation - Level of Assistance: Modified independent  · Assistive Device: Walker, rolling  · Interventions: Verbal cues, Safety awareness training          Transfers:     · Sit to Stand: Modified independent  · Stand to Sit: Modified independent  · Stand Pivot Transfers: Modified independent  · Bed to Chair: Modified independent  · Lateral Transfers: Modified independent         Bed Mobility:     · Rolling: Modified independent  · Supine to Sit: Modified independent  · Sit to Supine: Modified independent  · Scooting: Modified independent       Strength:          UE STRENGTH: 3/5 shoulder flexion, 3/5 shoulder abduction, 3/5 shoulder extension, 3/5 elbow flexion, 3/5 elbow extension. LE STRENGTH:  4/5 hip flexion, 4/5 hip abduction, 4/5 hip adduction, 4/5 hip extension, 4/5 knee extension, 4/5 knee flexion, 4/5 ankle dorsiflexion, 3/5 ankle plantarflexion, 3/5 ankle inversion, 3/5 ankle eversion. Sensation:         Intact  Postural Control & Balance:  · Bonilla Balance Scale:  25/56.   (A score less than 45/56 indicates high risk of falls)        Body Structures Involved:  1. Nerves  2. Muscles Body Functions Affected:  1. Neuromusculoskeletal  2. Movement Related Activities and Participation Affected:  1. Mobility  2.  Self Care   Number of elements (examined above) that affect the Plan of Care: 4+: HIGH COMPLEXITY   CLINICAL PRESENTATION:   Presentation: Evolving clinical presentation with changing clinical characteristics: MODERATE COMPLEXITY   CLINICAL DECISION MAKING:   Use of outcome tool(s) and clinical judgement create a POC that gives a: Questionable prediction of patient's progress: MODERATE COMPLEXITY

## 2019-12-10 NOTE — PROGRESS NOTES
Kira Sanchez  : 1945  [place]  Secondary[place] [address] [facility] at [place] [address] [facility], [address] [place] [address],[address], [facility] [address]  [phone]   [phone]        OUTPATIENT PHYSICAL THERAPY: Daily Treatment Note 12/10/2019  Visit Count:  2    ICD-10: Treatment Diagnosis:   · Other abnormalities of gait and mobility (R26.89)  · Difficulty in walking, not elsewhere classified (R26.2)   Precautions/Allergies:   Patient has no known allergies. TREATMENT PLAN:  Effective Dates: 2019 TO 3/5/2020 (90 days). Frequency/Duration: 2 times a week for 90 Day(s)    Pre-treatment Symptoms/Complaints:  12/10/2019: Patient reports he is ok right now. Pain: Initial: Pain Intensity 1: 0  Post Session:  0/10   Medications Last Reviewed:  12/10/2019  Updated Objective Findings:  None Today  TREATMENT:     THERAPEUTIC EXERCISE: (45 minutes):  Exercises per grid below to improve mobility, strength, balance and coordination. Required minimal visual, verbal and manual cues to promote proper body alignment, promote proper body posture and promote proper body mechanics. Progressed resistance, range, repetitions and complexity of movement as indicated.    Date:  12/10/2019   Activity/Exercise Parameters   Nu-step 6 minutes  Level 3   Standing hip flexion 20 reps  B LE   Standing hip extension 20 reps  B LE   Standing hip abduction 20 reps  B LE   Standing knee flexion 20 reps  B LE   Standing heel raises 20 reps  B LE   Standing toe raises 20 reps  B LE   Step taps 6 inch  20 reps attempting to alternate UE use   Step overs 1/2 foam  20 reps attempting to alternate UE use   Sit-to-stand 10 reps   Walking in hallway Verbal cues for following directions and foot placement      Treatment/Session Summary:    · Response to Treatment:  Patient completed all activities with moderate verbal and physical cues to follow directions. · Communication/Consultation:  None today  · Equipment provided today:  None today  · Recommendations/Intent for next treatment session: Next visit will focus on improving overall mobility and safety.     Total Treatment Billable Duration:  45 minutes  PT Patient Time In/Time Out  Time In: 1015  Time Out: [address] [facility],     Future Appointments   Date Time Provider Stiven Carrizalesi   12/10/2019 10:15 AM Lesly , PT SFEORPT SFE   12/12/2019  1:00 PM Lesly , PT SFEORPT SFE   12/13/2019  2:20 PM Eros Del Castillo MD BSND BSND   12/17/2019 11:00 AM Lesly , PT SFEORPT SFE   12/20/2019  1:00 PM Lesly , PT SFEORPT SFE   12/23/2019 11:00 AM Elsly , PT SFEORPT SFE   2/11/2020  1:30 PM Latrice Fox MD SSA PMR PMR   3/3/2020  1:15 PM Ata Escalera MD [facility] IMANI

## 2019-12-12 NOTE — PROGRESS NOTES
Karley Born  : 1945  [place]  [place] [address] [place] [address] at [place] [address], [facility] 91.  [phone]   [phone]        OUTPATIENT PHYSICAL THERAPY: Daily Treatment Note 2019  Visit Count:  3    ICD-10: Treatment Diagnosis:   · Other abnormalities of gait and mobility (R26.89)  · Difficulty in walking, not elsewhere classified (R26.2)   Precautions/Allergies:   Patient has no known allergies. TREATMENT PLAN:  Effective Dates: 2019 TO 3/5/2020 (90 days). Frequency/Duration: 2 times a week for 90 Day(s)    Pre-treatment Symptoms/Complaints:  2019: Patient reports he is doing ok. Pain: Initial: Pain Intensity 1: 0  Post Session:  0/10   Medications Last Reviewed:  2019  Updated Objective Findings:  None Today  TREATMENT:     THERAPEUTIC EXERCISE: (45 minutes):  Exercises per grid below to improve mobility, strength, balance and coordination. Required minimal visual, verbal and manual cues to promote proper body alignment, promote proper body posture and promote proper body mechanics. Progressed resistance, range, repetitions and complexity of movement as indicated. Date:  2019   Activity/Exercise Parameters   Nu-step X    Standing hip flexion 20 reps  B LE   Standing hip extension 20 reps  B LE   Standing hip abduction 20 reps  B LE   Standing knee flexion 20 reps  B LE   Standing heel raises 20 reps  B LE   Standing toe raises 20 reps  B LE   Step taps 6 inch  20 reps attempting to alternate UE use  Maximal verbal cues   Step overs 1/2 foam  20 reps attempting to alternate UE use  Maximal verbal cues   Sit-to-stand 10 reps   Walking in hallway X       Treatment/Session Summary:    · Response to Treatment:  Patient required maximal verbal cues for all step tap attempts.   · Communication/Consultation:  None today  · Equipment provided today:  None today  · Recommendations/Intent for next treatment session: Next visit will focus on improving overall mobility and safety.     Total Treatment Billable Duration:  45 minutes  PT Patient Time In/Time Out  Time In: 1300  Time Out: 75 Rell Ramey, PT    Future Appointments   Date Time Provider Stiven Albrecht   12/12/2019  1:00 PM Christina Rebolledo PT [facility] SFE   12/13/2019  2:20 PM Eloina Elise MD BSND BSND   12/17/2019 11:00 AM Christina Rebolledo PT [facility] SFE   12/20/2019  1:00 PM Christina Rebolledo PT [facility] SFE   12/23/2019 11:00 AM Christina Rebolledo PT [facility] SFE   1/6/2020  2:15 PM Keith Zhao MD [facility] UCDG UCD   2/11/2020  1:30 PM Mary Lan MD [facility] PMR PMR   3/3/2020  1:15 PM Deepti Russell MD [facility] IMANI

## 2019-12-17 NOTE — PROGRESS NOTES
Marialuisa Beckham  : 1945  [place]  Secondary: [place] [facility] at [place] [address] [facility], [address], [place] [facility].  [phone]   [phone]        OUTPATIENT PHYSICAL THERAPY: Daily Treatment Note 2019  Visit Count:  4    ICD-10: Treatment Diagnosis:   · Other abnormalities of gait and mobility (R26.89)  · Difficulty in walking, not elsewhere classified (R26.2)   Precautions/Allergies:   Patient has no known allergies. TREATMENT PLAN:  Effective Dates: 2019 TO 3/5/2020 (90 days). Frequency/Duration: 2 times a week for 90 Day(s)    Pre-treatment Symptoms/Complaints:  2019: Patient reports no complaints  Pain: Initial: Pain Intensity 1: 0  Post Session:  0/10   Medications Last Reviewed:  2019  Updated Objective Findings:  None Today  TREATMENT:     THERAPEUTIC EXERCISE: (45 minutes):  Exercises per grid below to improve mobility, strength, balance and coordination. Required minimal visual, verbal and manual cues to promote proper body alignment, promote proper body posture and promote proper body mechanics. Progressed resistance, range, repetitions and complexity of movement as indicated. Date:  2019   Activity/Exercise Parameters   Nu-step X    Standing hip flexion 20 reps  B LE   Standing hip extension 20 reps  B LE   Standing hip abduction 20 reps  B LE   Standing knee flexion 20 reps  B LE   Standing heel raises 20 reps  B LE   Standing toe raises 20 reps  B LE   Step taps 6 inch  20 reps attempting to alternate UE use  Maximal verbal cues   Step overs 1/2 foam  20 reps attempting to alternate UE use  Maximal verbal cues   Sit-to-stand 10 reps   Walking in hallway X       Treatment/Session Summary:    · Response to Treatment:  Patient continues to have difficulty with right/left as well as body parts.   · Communication/Consultation:  None today  · Equipment provided today:  None today  · Recommendations/Intent for next treatment session: Next visit will focus on improving overall mobility and safety.     Total Treatment Billable Duration:  45 minutes  PT Patient Time In/Time Out  Time In: 1100  Time Out: 503 [facility],     Future Appointments   Date Time Provider Stiven Albrecht   12/17/2019 11:00 AM Lesly , PT [facility] SFE   12/20/2019  1:00 PM Lesly , PT [facility] SFE   12/23/2019 11:00 AM Lesly , PT [facility] SFE   1/6/2020  2:15 PM Twin Verduzco MD [facility] UCDG UCD   1/24/2020  3:40 PM Eros Del Castillo MD BSND BSND   2/11/2020  1:30 PM Latrice Fox MD [facility] PMR PMR   3/3/2020  1:15 PM Ata Escalera MD [facility] IMANI IMANI

## 2019-12-20 NOTE — PROGRESS NOTES
Therapy Center at [address], [address] [place] [address], [address]  Phone: [phone]   Fax: [phone]    OUTPATIENT DAILY NOTE    NAME/AGE/GENDER: Ro Wilson is a 66 y.o. male. DATE: 12/20/2019    Patient cancelled (less than 24 hours ago) his appointment for today due to \"will not be able to make it today. \"  Will plan to follow up on next scheduled visit.     Addison Sanon, PT    Future Appointments   Date Time Provider Stiven Albrecht   12/20/2019  1:00 PM Fidelina Medel [facility] SFE   12/23/2019 11:00 AM Meet Wilcox PT [facility] SFE   1/6/2020  2:15 PM Anand Petersen MD [facility] UCDG UCD   1/24/2020  3:40 PM Deborah Olguin MD [facility] BSND   2/11/2020  1:30 PM Melchor Trivedi MD [facility] PMR PMR   3/3/2020  1:15 PM Kp Fenton MD [facility] IMANI IMANI

## 2019-12-23 NOTE — PROGRESS NOTES
Therapy Center at [address], [address] [place] [address], [address]  Phone: [phone]   Fax: [phone]    OUTPATIENT DAILY NOTE    NAME/AGE/GENDER: Millie Russo is a 66 y.o. male. DATE: 12/23/2019    Patient cancelled (less than 24 hours ago) his appointment for today due to weather concerns. Will plan to follow up on next scheduled visit.     William Gonzalez, PT    Future Appointments   Date Time Provider Stiven Albrecht   12/23/2019 11:00 AM Laureen Houston [facility] SFE   1/6/2020  2:15 PM Kesha Euceda MD [facility] UCDG UCD   1/24/2020  3:40 PM Crista Mace MD BSND BSND   2/11/2020  1:30 PM Maria Alejandra Duckworth MD [facility] PMR PMR   3/3/2020  1:15 PM Mark Plascencia MD [facility] IMANI IMANI

## 2020-01-01 ENCOUNTER — HOSPICE ADMISSION (OUTPATIENT)
Dept: HOSPICE | Facility: HOSPICE | Age: 75
End: 2020-01-01
Payer: MEDICARE

## 2020-01-01 ENCOUNTER — APPOINTMENT (OUTPATIENT)
Dept: PHYSICAL THERAPY | Age: 75
End: 2020-01-01
Attending: PHYSICAL MEDICINE & REHABILITATION

## 2020-01-01 ENCOUNTER — APPOINTMENT (OUTPATIENT)
Dept: CT IMAGING | Age: 75
DRG: 064 | End: 2020-01-01
Attending: EMERGENCY MEDICINE
Payer: MEDICARE

## 2020-01-01 ENCOUNTER — HOSPITAL ENCOUNTER (INPATIENT)
Age: 75
LOS: 1 days | Discharge: HOSPICE | DRG: 064 | End: 2020-03-02
Attending: EMERGENCY MEDICINE | Admitting: FAMILY MEDICINE
Payer: MEDICARE

## 2020-01-01 ENCOUNTER — HOSPITAL ENCOUNTER (OUTPATIENT)
Dept: LAB | Age: 75
Discharge: HOME | End: 2020-01-24
Payer: MEDICARE

## 2020-01-01 ENCOUNTER — HOSPITAL ENCOUNTER (INPATIENT)
Age: 75
LOS: 2 days | End: 2020-03-04
Attending: INTERNAL MEDICINE | Admitting: INTERNAL MEDICINE

## 2020-01-01 ENCOUNTER — APPOINTMENT (OUTPATIENT)
Dept: GENERAL RADIOLOGY | Age: 75
DRG: 064 | End: 2020-01-01
Attending: HOSPITALIST
Payer: MEDICARE

## 2020-01-01 VITALS
HEART RATE: 80 BPM | SYSTOLIC BLOOD PRESSURE: 233 MMHG | HEIGHT: 69 IN | OXYGEN SATURATION: 97 % | TEMPERATURE: 98 F | DIASTOLIC BLOOD PRESSURE: 104 MMHG | BODY MASS INDEX: 26.66 KG/M2 | WEIGHT: 180 LBS | RESPIRATION RATE: 20 BRPM

## 2020-01-01 VITALS
DIASTOLIC BLOOD PRESSURE: 98 MMHG | RESPIRATION RATE: 40 BRPM | TEMPERATURE: 99.1 F | SYSTOLIC BLOOD PRESSURE: 186 MMHG | HEART RATE: 154 BPM

## 2020-01-01 DIAGNOSIS — G40.909 SEIZURE DISORDER (HCC): ICD-10-CM

## 2020-01-01 DIAGNOSIS — I16.1 HYPERTENSIVE EMERGENCY: ICD-10-CM

## 2020-01-01 DIAGNOSIS — I62.9 INTRACRANIAL HEMORRHAGE (HCC): Primary | ICD-10-CM

## 2020-01-01 LAB
ALBUMIN SERPL-MCNC: 3.5 G/DL (ref 3.2–4.6)
ALBUMIN/GLOB SERPL: 1 {RATIO} (ref 1.2–3.5)
ALP SERPL-CCNC: 185 U/L (ref 50–136)
ALT SERPL-CCNC: 27 U/L (ref 12–65)
ANION GAP SERPL CALC-SCNC: 4 MMOL/L (ref 7–16)
ANION GAP SERPL CALC-SCNC: 6 MMOL/L (ref 7–16)
APPEARANCE UR: CLEAR
AST SERPL-CCNC: 27 U/L (ref 15–37)
ATRIAL RATE: 86 BPM
BACTERIA SPEC CULT: NORMAL
BACTERIA SPEC CULT: NORMAL
BACTERIA URNS QL MICRO: ABNORMAL /HPF
BASOPHILS # BLD: 0 K/UL (ref 0–0.2)
BASOPHILS NFR BLD: 0 % (ref 0–2)
BASOPHILS NFR BLD: 0 % (ref 0–2)
BASOPHILS NFR BLD: 1 % (ref 0–2)
BILIRUB SERPL-MCNC: 0.3 MG/DL (ref 0.2–1.1)
BILIRUB UR QL: NEGATIVE
BUN SERPL-MCNC: 15 MG/DL (ref 8–23)
BUN SERPL-MCNC: 16 MG/DL (ref 8–23)
CALCIUM SERPL-MCNC: 8.4 MG/DL (ref 8.3–10.4)
CALCIUM SERPL-MCNC: 8.8 MG/DL (ref 8.3–10.4)
CALCULATED P AXIS, ECG09: 76 DEGREES
CALCULATED R AXIS, ECG10: 19 DEGREES
CALCULATED T AXIS, ECG11: 73 DEGREES
CHLORIDE SERPL-SCNC: 107 MMOL/L (ref 98–107)
CHLORIDE SERPL-SCNC: 110 MMOL/L (ref 98–107)
CO2 SERPL-SCNC: 29 MMOL/L (ref 21–32)
CO2 SERPL-SCNC: 34 MMOL/L (ref 21–32)
COLOR UR: YELLOW
CREAT SERPL-MCNC: 0.81 MG/DL (ref 0.8–1.5)
CREAT SERPL-MCNC: 0.82 MG/DL (ref 0.8–1.5)
CRYSTALS URNS QL MICRO: ABNORMAL /LPF
DIAGNOSIS, 93000: NORMAL
DIFFERENTIAL METHOD BLD: ABNORMAL
EOSINOPHIL # BLD: 0 K/UL (ref 0–0.8)
EOSINOPHIL # BLD: 0 K/UL (ref 0–0.8)
EOSINOPHIL # BLD: 0.2 K/UL (ref 0–0.8)
EOSINOPHIL NFR BLD: 0 % (ref 0.5–7.8)
EOSINOPHIL NFR BLD: 0 % (ref 0.5–7.8)
EOSINOPHIL NFR BLD: 3 % (ref 0.5–7.8)
EPI CELLS #/AREA URNS HPF: ABNORMAL /HPF
ERYTHROCYTE [DISTWIDTH] IN BLOOD BY AUTOMATED COUNT: 13.2 % (ref 11.9–14.6)
ERYTHROCYTE [DISTWIDTH] IN BLOOD BY AUTOMATED COUNT: 13.3 % (ref 11.9–14.6)
ERYTHROCYTE [DISTWIDTH] IN BLOOD BY AUTOMATED COUNT: 13.6 % (ref 11.9–14.6)
GLOBULIN SER CALC-MCNC: 3.6 G/DL (ref 2.3–3.5)
GLUCOSE BLD STRIP.AUTO-MCNC: 125 MG/DL (ref 65–100)
GLUCOSE SERPL-MCNC: 146 MG/DL (ref 65–100)
GLUCOSE SERPL-MCNC: 164 MG/DL (ref 65–100)
GLUCOSE UR STRIP.AUTO-MCNC: NEGATIVE MG/DL
HCT VFR BLD AUTO: 40.7 % (ref 41.1–50.3)
HCT VFR BLD AUTO: 41.6 % (ref 41.1–50.3)
HCT VFR BLD AUTO: 43.2 % (ref 41.1–50.3)
HGB BLD-MCNC: 12.8 G/DL (ref 13.6–17.2)
HGB BLD-MCNC: 13.1 G/DL (ref 13.6–17.2)
HGB BLD-MCNC: 13.6 G/DL (ref 13.6–17.2)
HGB UR QL STRIP: NEGATIVE
IMM GRANULOCYTES # BLD AUTO: 0 K/UL (ref 0–0.5)
IMM GRANULOCYTES # BLD AUTO: 0.1 K/UL (ref 0–0.5)
IMM GRANULOCYTES # BLD AUTO: 0.1 K/UL (ref 0–0.5)
IMM GRANULOCYTES NFR BLD AUTO: 0 % (ref 0–5)
IMM GRANULOCYTES NFR BLD AUTO: 0 % (ref 0–5)
IMM GRANULOCYTES NFR BLD AUTO: 1 % (ref 0–5)
INR BLD: 1 (ref 0.9–1.2)
INR PPP: 1
KETONES UR QL STRIP.AUTO: NEGATIVE MG/DL
LEUKOCYTE ESTERASE UR QL STRIP.AUTO: ABNORMAL
LYMPHOCYTES # BLD: 0.5 K/UL (ref 0.5–4.6)
LYMPHOCYTES # BLD: 0.7 K/UL (ref 0.5–4.6)
LYMPHOCYTES # BLD: 0.7 K/UL (ref 0.5–4.6)
LYMPHOCYTES NFR BLD: 11 % (ref 13–44)
LYMPHOCYTES NFR BLD: 3 % (ref 13–44)
LYMPHOCYTES NFR BLD: 6 % (ref 13–44)
MCH RBC QN AUTO: 27.9 PG (ref 26.1–32.9)
MCH RBC QN AUTO: 28.2 PG (ref 26.1–32.9)
MCH RBC QN AUTO: 28.5 PG (ref 26.1–32.9)
MCHC RBC AUTO-ENTMCNC: 31.4 G/DL (ref 31.4–35)
MCHC RBC AUTO-ENTMCNC: 31.5 G/DL (ref 31.4–35)
MCHC RBC AUTO-ENTMCNC: 31.5 G/DL (ref 31.4–35)
MCV RBC AUTO: 88.7 FL (ref 79.6–97.8)
MCV RBC AUTO: 89.6 FL (ref 79.6–97.8)
MCV RBC AUTO: 90.6 FL (ref 79.6–97.8)
MONOCYTES # BLD: 0.4 K/UL (ref 0.1–1.3)
MONOCYTES # BLD: 0.6 K/UL (ref 0.1–1.3)
MONOCYTES # BLD: 0.6 K/UL (ref 0.1–1.3)
MONOCYTES NFR BLD: 3 % (ref 4–12)
MONOCYTES NFR BLD: 4 % (ref 4–12)
MONOCYTES NFR BLD: 9 % (ref 4–12)
MUCOUS THREADS URNS QL MICRO: ABNORMAL /LPF
NEUTS SEG # BLD: 11 K/UL (ref 1.7–8.2)
NEUTS SEG # BLD: 14 K/UL (ref 1.7–8.2)
NEUTS SEG # BLD: 4.8 K/UL (ref 1.7–8.2)
NEUTS SEG NFR BLD: 77 % (ref 43–78)
NEUTS SEG NFR BLD: 90 % (ref 43–78)
NEUTS SEG NFR BLD: 93 % (ref 43–78)
NITRITE UR QL STRIP.AUTO: NEGATIVE
NRBC # BLD: 0 K/UL (ref 0–0.2)
OTHER OBSERVATIONS,UCOM: ABNORMAL
P-R INTERVAL, ECG05: 200 MS
PH UR STRIP: 6 [PH] (ref 5–9)
PHENYTOIN FREE SERPL-MCNC: ABNORMAL UG/ML (ref 1–2)
PHENYTOIN SERPL-MCNC: 1.5 UG/ML (ref 10–20)
PHENYTOIN SERPL-MCNC: 4.2 UG/ML (ref 10–20)
PLATELET # BLD AUTO: 187 K/UL (ref 150–450)
PLATELET # BLD AUTO: 189 K/UL (ref 150–450)
PLATELET # BLD AUTO: 229 K/UL (ref 150–450)
PMV BLD AUTO: 9.3 FL (ref 9.4–12.3)
POTASSIUM SERPL-SCNC: 3.2 MMOL/L (ref 3.5–5.1)
POTASSIUM SERPL-SCNC: 3.3 MMOL/L (ref 3.5–5.1)
PROT SERPL-MCNC: 7.1 G/DL (ref 6.3–8.2)
PROT UR STRIP-MCNC: NEGATIVE MG/DL
PROTHROMBIN TIME: 13.7 SEC (ref 12–14.7)
PT BLD: 12.1 SECS (ref 9.6–11.6)
Q-T INTERVAL, ECG07: 408 MS
QRS DURATION, ECG06: 102 MS
QTC CALCULATION (BEZET), ECG08: 488 MS
RBC # BLD AUTO: 4.59 M/UL (ref 4.23–5.6)
RBC # BLD AUTO: 4.59 M/UL (ref 4.23–5.6)
RBC # BLD AUTO: 4.82 M/UL (ref 4.23–5.6)
RBC #/AREA URNS HPF: ABNORMAL /HPF
SERVICE CMNT-IMP: NORMAL
SODIUM SERPL-SCNC: 145 MMOL/L (ref 136–145)
SODIUM SERPL-SCNC: 145 MMOL/L (ref 136–145)
SP GR UR REFRACTOMETRY: 1.02 (ref 1–1.02)
TROPONIN I SERPL-MCNC: <0.02 NG/ML (ref 0.02–0.05)
UROBILINOGEN UR QL STRIP.AUTO: 0.2 EU/DL (ref 0.2–1)
VENTRICULAR RATE, ECG03: 86 BPM
WBC # BLD AUTO: 12.3 K/UL (ref 4.3–11.1)
WBC # BLD AUTO: 15.1 K/UL (ref 4.3–11.1)
WBC # BLD AUTO: 6.3 K/UL (ref 4.3–11.1)
WBC URNS QL MICRO: ABNORMAL /HPF

## 2020-01-01 PROCEDURE — 70496 CT ANGIOGRAPHY HEAD: CPT

## 2020-01-01 PROCEDURE — G0156 HHCP-SVS OF AIDE,EA 15 MIN: HCPCS

## 2020-01-01 PROCEDURE — 0656 HSPC GENERAL INPATIENT

## 2020-01-01 PROCEDURE — 80185 ASSAY OF PHENYTOIN TOTAL: CPT

## 2020-01-01 PROCEDURE — 70450 CT HEAD/BRAIN W/O DYE: CPT

## 2020-01-01 PROCEDURE — 74018 RADEX ABDOMEN 1 VIEW: CPT

## 2020-01-01 PROCEDURE — 74011250636 HC RX REV CODE- 250/636: Performed by: EMERGENCY MEDICINE

## 2020-01-01 PROCEDURE — 81003 URINALYSIS AUTO W/O SCOPE: CPT

## 2020-01-01 PROCEDURE — 74011000250 HC RX REV CODE- 250: Performed by: NURSE PRACTITIONER

## 2020-01-01 PROCEDURE — 82962 GLUCOSE BLOOD TEST: CPT

## 2020-01-01 PROCEDURE — 93005 ELECTROCARDIOGRAM TRACING: CPT | Performed by: EMERGENCY MEDICINE

## 2020-01-01 PROCEDURE — 74011250636 HC RX REV CODE- 250/636: Performed by: NURSE PRACTITIONER

## 2020-01-01 PROCEDURE — 85610 PROTHROMBIN TIME: CPT

## 2020-01-01 PROCEDURE — 74011250636 HC RX REV CODE- 250/636: Performed by: FAMILY MEDICINE

## 2020-01-01 PROCEDURE — 65610000001 HC ROOM ICU GENERAL

## 2020-01-01 PROCEDURE — 74011000258 HC RX REV CODE- 258

## 2020-01-01 PROCEDURE — 77030008771 HC TU NG SALEM SUMP -A

## 2020-01-01 PROCEDURE — 80053 COMPREHEN METABOLIC PANEL: CPT

## 2020-01-01 PROCEDURE — G0155 HHCP-SVS OF CSW,EA 15 MIN: HCPCS

## 2020-01-01 PROCEDURE — 51702 INSERT TEMP BLADDER CATH: CPT

## 2020-01-01 PROCEDURE — 94762 N-INVAS EAR/PLS OXIMTRY CONT: CPT

## 2020-01-01 PROCEDURE — 99285 EMERGENCY DEPT VISIT HI MDM: CPT

## 2020-01-01 PROCEDURE — 74011250637 HC RX REV CODE- 250/637: Performed by: HOSPITALIST

## 2020-01-01 PROCEDURE — G0299 HHS/HOSPICE OF RN EA 15 MIN: HCPCS

## 2020-01-01 PROCEDURE — 74011000250 HC RX REV CODE- 250: Performed by: EMERGENCY MEDICINE

## 2020-01-01 PROCEDURE — 3336500001 HSPC ELECTION

## 2020-01-01 PROCEDURE — 74011636320 HC RX REV CODE- 636/320

## 2020-01-01 PROCEDURE — 96375 TX/PRO/DX INJ NEW DRUG ADDON: CPT

## 2020-01-01 PROCEDURE — 36415 COLL VENOUS BLD VENIPUNCTURE: CPT

## 2020-01-01 PROCEDURE — 85025 COMPLETE CBC W/AUTO DIFF WBC: CPT

## 2020-01-01 PROCEDURE — 96365 THER/PROPH/DIAG IV INF INIT: CPT

## 2020-01-01 PROCEDURE — 87086 URINE CULTURE/COLONY COUNT: CPT

## 2020-01-01 PROCEDURE — 74011000250 HC RX REV CODE- 250: Performed by: FAMILY MEDICINE

## 2020-01-01 PROCEDURE — 84484 ASSAY OF TROPONIN QUANT: CPT

## 2020-01-01 RX ORDER — HYDROMORPHONE HYDROCHLORIDE 1 MG/ML
0.5 INJECTION, SOLUTION INTRAMUSCULAR; INTRAVENOUS; SUBCUTANEOUS
Status: DISCONTINUED | OUTPATIENT
Start: 2020-01-01 | End: 2020-01-01 | Stop reason: HOSPADM

## 2020-01-01 RX ORDER — PHENOBARBITAL SODIUM 65 MG/ML
130 INJECTION INTRAMUSCULAR ONCE
Status: COMPLETED | OUTPATIENT
Start: 2020-01-01 | End: 2020-01-01

## 2020-01-01 RX ORDER — LANOLIN ALCOHOL/MO/W.PET/CERES
400 CREAM (GRAM) TOPICAL DAILY
Status: DISCONTINUED | OUTPATIENT
Start: 2020-01-01 | End: 2020-01-01

## 2020-01-01 RX ORDER — PHENYTOIN 125 MG/5ML
200 SUSPENSION ORAL 2 TIMES DAILY
Status: DISCONTINUED | OUTPATIENT
Start: 2020-01-01 | End: 2020-01-01

## 2020-01-01 RX ORDER — HALOPERIDOL 5 MG/ML
2 INJECTION INTRAMUSCULAR
Status: DISCONTINUED | OUTPATIENT
Start: 2020-01-01 | End: 2020-01-01 | Stop reason: HOSPADM

## 2020-01-01 RX ORDER — LORAZEPAM 2 MG/ML
1 CONCENTRATE ORAL
Status: DISCONTINUED | OUTPATIENT
Start: 2020-01-01 | End: 2020-01-01 | Stop reason: HOSPADM

## 2020-01-01 RX ORDER — SODIUM CHLORIDE 9 MG/ML
75 INJECTION, SOLUTION INTRAVENOUS CONTINUOUS
Status: DISCONTINUED | OUTPATIENT
Start: 2020-01-01 | End: 2020-01-01

## 2020-01-01 RX ORDER — LISINOPRIL 20 MG/1
20 TABLET ORAL 2 TIMES DAILY
Status: DISCONTINUED | OUTPATIENT
Start: 2020-01-01 | End: 2020-01-01

## 2020-01-01 RX ORDER — LORAZEPAM 2 MG/ML
1 INJECTION INTRAMUSCULAR
Status: COMPLETED | OUTPATIENT
Start: 2020-01-01 | End: 2020-01-01

## 2020-01-01 RX ORDER — DEXAMETHASONE SODIUM PHOSPHATE 4 MG/ML
4 INJECTION, SOLUTION INTRA-ARTICULAR; INTRALESIONAL; INTRAMUSCULAR; INTRAVENOUS; SOFT TISSUE EVERY 6 HOURS
Status: DISCONTINUED | OUTPATIENT
Start: 2020-01-01 | End: 2020-01-01 | Stop reason: HOSPADM

## 2020-01-01 RX ORDER — GLYCOPYRROLATE 0.2 MG/ML
0.2 INJECTION INTRAMUSCULAR; INTRAVENOUS
Status: DISCONTINUED | OUTPATIENT
Start: 2020-01-01 | End: 2020-01-01 | Stop reason: HOSPADM

## 2020-01-01 RX ORDER — ACETAMINOPHEN 325 MG/1
650 TABLET ORAL
Status: DISCONTINUED | OUTPATIENT
Start: 2020-01-01 | End: 2020-01-01 | Stop reason: HOSPADM

## 2020-01-01 RX ORDER — GUAIFENESIN 100 MG/5ML
81 LIQUID (ML) ORAL DAILY
Status: DISCONTINUED | OUTPATIENT
Start: 2020-01-01 | End: 2020-01-01

## 2020-01-01 RX ORDER — ACETAMINOPHEN 650 MG/1
650 SUPPOSITORY RECTAL
Status: DISCONTINUED | OUTPATIENT
Start: 2020-01-01 | End: 2020-01-01 | Stop reason: HOSPADM

## 2020-01-01 RX ORDER — LABETALOL HYDROCHLORIDE 5 MG/ML
5 INJECTION, SOLUTION INTRAVENOUS ONCE
Status: COMPLETED | OUTPATIENT
Start: 2020-01-01 | End: 2020-01-01

## 2020-01-01 RX ORDER — SCOLOPAMINE TRANSDERMAL SYSTEM 1 MG/1
1 PATCH, EXTENDED RELEASE TRANSDERMAL
Status: DISCONTINUED | OUTPATIENT
Start: 2020-01-01 | End: 2020-01-01 | Stop reason: HOSPADM

## 2020-01-01 RX ORDER — PHENOBARBITAL SODIUM 65 MG/ML
65 INJECTION INTRAMUSCULAR EVERY 12 HOURS
Status: DISCONTINUED | OUTPATIENT
Start: 2020-01-01 | End: 2020-01-01 | Stop reason: HOSPADM

## 2020-01-01 RX ORDER — LORAZEPAM 0.5 MG/1
0.5 TABLET ORAL
Status: DISCONTINUED | OUTPATIENT
Start: 2020-01-01 | End: 2020-01-01

## 2020-01-01 RX ORDER — SODIUM CHLORIDE 0.9 % (FLUSH) 0.9 %
3 SYRINGE (ML) INJECTION AS NEEDED
Status: DISCONTINUED | OUTPATIENT
Start: 2020-01-01 | End: 2020-01-01 | Stop reason: HOSPADM

## 2020-01-01 RX ORDER — SODIUM CHLORIDE 0.9 % (FLUSH) 0.9 %
3 SYRINGE (ML) INJECTION EVERY 12 HOURS
Status: DISCONTINUED | OUTPATIENT
Start: 2020-01-01 | End: 2020-01-01 | Stop reason: HOSPADM

## 2020-01-01 RX ORDER — PANTOPRAZOLE SODIUM 40 MG/1
40 TABLET, DELAYED RELEASE ORAL
Status: DISCONTINUED | OUTPATIENT
Start: 2020-01-01 | End: 2020-01-01

## 2020-01-01 RX ORDER — PHENYTOIN SODIUM 100 MG/1
200 CAPSULE, EXTENDED RELEASE ORAL 2 TIMES DAILY
Status: DISCONTINUED | OUTPATIENT
Start: 2020-01-01 | End: 2020-01-01 | Stop reason: ALTCHOICE

## 2020-01-01 RX ORDER — SODIUM CHLORIDE 0.9 % (FLUSH) 0.9 %
10 SYRINGE (ML) INJECTION
Status: ACTIVE | OUTPATIENT
Start: 2020-01-01 | End: 2020-01-01

## 2020-01-01 RX ORDER — LORAZEPAM 2 MG/ML
1 INJECTION INTRAMUSCULAR
Status: DISCONTINUED | OUTPATIENT
Start: 2020-01-01 | End: 2020-01-01 | Stop reason: HOSPADM

## 2020-01-01 RX ORDER — HYDRALAZINE HYDROCHLORIDE 50 MG/1
25 TABLET, FILM COATED ORAL 3 TIMES DAILY
Status: DISCONTINUED | OUTPATIENT
Start: 2020-01-01 | End: 2020-01-01

## 2020-01-01 RX ORDER — LORAZEPAM 2 MG/ML
1 INJECTION INTRAMUSCULAR EVERY 8 HOURS
Status: DISCONTINUED | OUTPATIENT
Start: 2020-01-01 | End: 2020-01-01 | Stop reason: HOSPADM

## 2020-01-01 RX ORDER — PAROXETINE HYDROCHLORIDE 20 MG/1
30 TABLET, FILM COATED ORAL DAILY
Status: DISCONTINUED | OUTPATIENT
Start: 2020-01-01 | End: 2020-01-01

## 2020-01-01 RX ORDER — SODIUM CHLORIDE 0.9 % (FLUSH) 0.9 %
5-40 SYRINGE (ML) INJECTION AS NEEDED
Status: DISCONTINUED | OUTPATIENT
Start: 2020-01-01 | End: 2020-01-01 | Stop reason: HOSPADM

## 2020-01-01 RX ORDER — SODIUM CHLORIDE 0.9 % (FLUSH) 0.9 %
5-40 SYRINGE (ML) INJECTION EVERY 8 HOURS
Status: DISCONTINUED | OUTPATIENT
Start: 2020-01-01 | End: 2020-01-01

## 2020-01-01 RX ORDER — ONDANSETRON 2 MG/ML
4 INJECTION INTRAMUSCULAR; INTRAVENOUS
Status: DISCONTINUED | OUTPATIENT
Start: 2020-01-01 | End: 2020-01-01 | Stop reason: HOSPADM

## 2020-01-01 RX ORDER — ATORVASTATIN CALCIUM 40 MG/1
40 TABLET, FILM COATED ORAL
Status: DISCONTINUED | OUTPATIENT
Start: 2020-01-01 | End: 2020-01-01

## 2020-01-01 RX ORDER — ADHESIVE BANDAGE
30 BANDAGE TOPICAL DAILY PRN
Status: DISCONTINUED | OUTPATIENT
Start: 2020-01-01 | End: 2020-01-01

## 2020-01-01 RX ORDER — HALOPERIDOL 5 MG/ML
4 INJECTION INTRAMUSCULAR EVERY 8 HOURS
Status: DISCONTINUED | OUTPATIENT
Start: 2020-01-01 | End: 2020-01-01 | Stop reason: HOSPADM

## 2020-01-01 RX ORDER — NIFEDIPINE 30 MG/1
60 TABLET, EXTENDED RELEASE ORAL DAILY
Status: DISCONTINUED | OUTPATIENT
Start: 2020-01-01 | End: 2020-01-01

## 2020-01-01 RX ORDER — DONEPEZIL HYDROCHLORIDE 5 MG/1
10 TABLET, FILM COATED ORAL DAILY
Status: DISCONTINUED | OUTPATIENT
Start: 2020-01-01 | End: 2020-01-01

## 2020-01-01 RX ORDER — POTASSIUM CHLORIDE 750 MG/1
10 TABLET, EXTENDED RELEASE ORAL DAILY
Status: DISCONTINUED | OUTPATIENT
Start: 2020-01-01 | End: 2020-01-01

## 2020-01-01 RX ORDER — LORAZEPAM 2 MG/ML
2 INJECTION INTRAMUSCULAR
Status: COMPLETED | OUTPATIENT
Start: 2020-01-01 | End: 2020-01-01

## 2020-01-01 RX ORDER — DONEPEZIL HYDROCHLORIDE 5 MG/1
10 TABLET, FILM COATED ORAL
Status: DISCONTINUED | OUTPATIENT
Start: 2020-01-01 | End: 2020-01-01

## 2020-01-01 RX ORDER — ACETAMINOPHEN 325 MG/1
650 TABLET ORAL
Status: DISCONTINUED | OUTPATIENT
Start: 2020-01-01 | End: 2020-01-01

## 2020-01-01 RX ORDER — LORAZEPAM 2 MG/ML
2 INJECTION INTRAMUSCULAR
Status: DISCONTINUED | OUTPATIENT
Start: 2020-01-01 | End: 2020-01-01 | Stop reason: HOSPADM

## 2020-01-01 RX ORDER — FACIAL-BODY WIPES
10 EACH TOPICAL AS NEEDED
Status: DISCONTINUED | OUTPATIENT
Start: 2020-01-01 | End: 2020-01-01 | Stop reason: HOSPADM

## 2020-01-01 RX ORDER — NIFEDIPINE 30 MG/1
60 TABLET, EXTENDED RELEASE ORAL EVERY 12 HOURS
Status: DISCONTINUED | OUTPATIENT
Start: 2020-01-01 | End: 2020-01-01

## 2020-01-01 RX ORDER — ONDANSETRON 2 MG/ML
8 INJECTION INTRAMUSCULAR; INTRAVENOUS ONCE
Status: COMPLETED | OUTPATIENT
Start: 2020-01-01 | End: 2020-01-01

## 2020-01-01 RX ORDER — PHENYTOIN 125 MG/5ML
200 SUSPENSION ORAL 2 TIMES DAILY
Status: DISCONTINUED | OUTPATIENT
Start: 2020-01-01 | End: 2020-01-01 | Stop reason: HOSPADM

## 2020-01-01 RX ORDER — MORPHINE SULFATE 2 MG/ML
2 INJECTION, SOLUTION INTRAMUSCULAR; INTRAVENOUS
Status: DISCONTINUED | OUTPATIENT
Start: 2020-01-01 | End: 2020-01-01 | Stop reason: HOSPADM

## 2020-01-01 RX ADMIN — LORAZEPAM 1 MG: 2 INJECTION INTRAMUSCULAR; INTRAVENOUS at 06:10

## 2020-01-01 RX ADMIN — HALOPERIDOL LACTATE 2 MG: 5 INJECTION INTRAMUSCULAR at 18:04

## 2020-01-01 RX ADMIN — DEXAMETHASONE SODIUM PHOSPHATE 4 MG: 4 INJECTION, SOLUTION INTRAMUSCULAR; INTRAVENOUS at 08:04

## 2020-01-01 RX ADMIN — HYDROMORPHONE HYDROCHLORIDE 0.5 MG: 1 INJECTION, SOLUTION INTRAMUSCULAR; INTRAVENOUS; SUBCUTANEOUS at 04:04

## 2020-01-01 RX ADMIN — ONDANSETRON 8 MG: 2 INJECTION INTRAMUSCULAR; INTRAVENOUS at 02:21

## 2020-01-01 RX ADMIN — HYDROMORPHONE HYDROCHLORIDE 0.5 MG: 1 INJECTION, SOLUTION INTRAMUSCULAR; INTRAVENOUS; SUBCUTANEOUS at 03:15

## 2020-01-01 RX ADMIN — NICARDIPINE HYDROCHLORIDE 15 MG/HR: 25 INJECTION INTRAVENOUS at 04:43

## 2020-01-01 RX ADMIN — LABETALOL HYDROCHLORIDE 5 MG: 5 INJECTION INTRAVENOUS at 07:19

## 2020-01-01 RX ADMIN — PHENYTOIN 200 MG: 125 SUSPENSION ORAL at 11:13

## 2020-01-01 RX ADMIN — NICARDIPINE HYDROCHLORIDE 10 MG/HR: 25 INJECTION INTRAVENOUS at 03:19

## 2020-01-01 RX ADMIN — NICARDIPINE HYDROCHLORIDE 5 MG/HR: 25 INJECTION INTRAVENOUS at 02:22

## 2020-01-01 RX ADMIN — LORAZEPAM 1 MG: 2 INJECTION INTRAMUSCULAR; INTRAVENOUS at 04:06

## 2020-01-01 RX ADMIN — SODIUM CHLORIDE, PRESERVATIVE FREE 3 ML: 5 INJECTION INTRAVENOUS at 04:05

## 2020-01-01 RX ADMIN — SODIUM CHLORIDE, PRESERVATIVE FREE 3 ML: 5 INJECTION INTRAVENOUS at 18:04

## 2020-01-01 RX ADMIN — PHENOBARBITAL SODIUM 65 MG: 65 INJECTION INTRAMUSCULAR; INTRAVENOUS at 08:04

## 2020-01-01 RX ADMIN — LORAZEPAM 2 MG: 2 INJECTION INTRAMUSCULAR; INTRAVENOUS at 10:43

## 2020-01-01 RX ADMIN — NICARDIPINE HYDROCHLORIDE 15 MG/HR: 25 INJECTION INTRAVENOUS at 06:28

## 2020-01-01 RX ADMIN — NICARDIPINE HYDROCHLORIDE 15 MG/HR: 25 INJECTION INTRAVENOUS at 08:29

## 2020-01-01 RX ADMIN — Medication 10 ML: at 05:52

## 2020-01-01 RX ADMIN — SODIUM CHLORIDE, PRESERVATIVE FREE 3 ML: 5 INJECTION INTRAVENOUS at 18:32

## 2020-01-01 RX ADMIN — SODIUM CHLORIDE, PRESERVATIVE FREE 3 ML: 5 INJECTION INTRAVENOUS at 20:43

## 2020-01-01 RX ADMIN — HALOPERIDOL LACTATE 4 MG: 5 INJECTION INTRAMUSCULAR at 14:40

## 2020-01-01 RX ADMIN — HYDROMORPHONE HYDROCHLORIDE 0.5 MG: 1 INJECTION, SOLUTION INTRAMUSCULAR; INTRAVENOUS; SUBCUTANEOUS at 18:04

## 2020-01-01 RX ADMIN — LORAZEPAM 1 MG: 2 INJECTION INTRAMUSCULAR; INTRAVENOUS at 21:33

## 2020-01-01 RX ADMIN — LABETALOL HYDROCHLORIDE 5 MG: 5 INJECTION INTRAVENOUS at 05:51

## 2020-01-01 RX ADMIN — LORAZEPAM 1 MG: 2 INJECTION INTRAMUSCULAR; INTRAVENOUS at 18:22

## 2020-01-01 RX ADMIN — SODIUM CHLORIDE 75 ML/HR: 900 INJECTION, SOLUTION INTRAVENOUS at 04:14

## 2020-01-01 RX ADMIN — SODIUM CHLORIDE, PRESERVATIVE FREE 3 ML: 5 INJECTION INTRAVENOUS at 09:32

## 2020-01-01 RX ADMIN — NICARDIPINE HYDROCHLORIDE 15 MG/HR: 25 INJECTION INTRAVENOUS at 03:32

## 2020-01-01 RX ADMIN — SODIUM CHLORIDE, PRESERVATIVE FREE 3 ML: 5 INJECTION INTRAVENOUS at 21:34

## 2020-01-01 RX ADMIN — PHENOBARBITAL SODIUM 65 MG: 65 INJECTION INTRAMUSCULAR; INTRAVENOUS at 09:25

## 2020-01-01 RX ADMIN — HALOPERIDOL LACTATE 4 MG: 5 INJECTION INTRAMUSCULAR at 06:10

## 2020-01-01 RX ADMIN — SODIUM CHLORIDE, PRESERVATIVE FREE 3 ML: 5 INJECTION INTRAVENOUS at 06:13

## 2020-01-01 RX ADMIN — SODIUM CHLORIDE, PRESERVATIVE FREE 3 ML: 5 INJECTION INTRAVENOUS at 22:33

## 2020-01-01 RX ADMIN — NICARDIPINE HYDROCHLORIDE 12.5 MG/HR: 25 INJECTION INTRAVENOUS at 03:25

## 2020-01-01 RX ADMIN — PROMETHAZINE HYDROCHLORIDE 12.5 MG: 25 INJECTION INTRAMUSCULAR; INTRAVENOUS at 13:07

## 2020-01-01 RX ADMIN — LORAZEPAM 1 MG: 2 INJECTION INTRAMUSCULAR; INTRAVENOUS at 03:26

## 2020-01-01 RX ADMIN — LORAZEPAM 1 MG: 2 INJECTION INTRAMUSCULAR; INTRAVENOUS at 22:31

## 2020-01-01 RX ADMIN — HYDROMORPHONE HYDROCHLORIDE 0.5 MG: 1 INJECTION, SOLUTION INTRAMUSCULAR; INTRAVENOUS; SUBCUTANEOUS at 18:31

## 2020-01-01 RX ADMIN — DEXAMETHASONE SODIUM PHOSPHATE 4 MG: 4 INJECTION, SOLUTION INTRAMUSCULAR; INTRAVENOUS at 01:49

## 2020-01-01 RX ADMIN — LORAZEPAM 1 MG: 2 INJECTION INTRAMUSCULAR; INTRAVENOUS at 20:37

## 2020-01-01 RX ADMIN — SODIUM CHLORIDE, PRESERVATIVE FREE 3 ML: 5 INJECTION INTRAVENOUS at 08:06

## 2020-01-01 RX ADMIN — HALOPERIDOL LACTATE 4 MG: 5 INJECTION INTRAMUSCULAR at 21:33

## 2020-01-01 RX ADMIN — SODIUM CHLORIDE, PRESERVATIVE FREE 3 ML: 5 INJECTION INTRAVENOUS at 01:49

## 2020-01-01 RX ADMIN — DEXAMETHASONE SODIUM PHOSPHATE 4 MG: 4 INJECTION, SOLUTION INTRAMUSCULAR; INTRAVENOUS at 20:43

## 2020-01-01 RX ADMIN — HALOPERIDOL LACTATE 2 MG: 5 INJECTION INTRAMUSCULAR at 10:28

## 2020-01-01 RX ADMIN — LORAZEPAM 1 MG: 2 INJECTION INTRAMUSCULAR; INTRAVENOUS at 13:29

## 2020-01-01 RX ADMIN — PHENOBARBITAL SODIUM 130 MG: 65 INJECTION INTRAMUSCULAR; INTRAVENOUS at 22:31

## 2020-01-01 RX ADMIN — SODIUM CHLORIDE, PRESERVATIVE FREE 3 ML: 5 INJECTION INTRAVENOUS at 10:43

## 2020-01-01 RX ADMIN — PHENOBARBITAL SODIUM 65 MG: 65 INJECTION INTRAMUSCULAR; INTRAVENOUS at 20:43

## 2020-01-01 RX ADMIN — HYDROMORPHONE HYDROCHLORIDE 0.5 MG: 1 INJECTION, SOLUTION INTRAMUSCULAR; INTRAVENOUS; SUBCUTANEOUS at 13:30

## 2020-01-01 RX ADMIN — PROMETHAZINE HYDROCHLORIDE 25 MG: 25 INJECTION INTRAMUSCULAR; INTRAVENOUS at 05:51

## 2020-01-01 RX ADMIN — LORAZEPAM 1 MG: 2 INJECTION INTRAMUSCULAR; INTRAVENOUS at 03:51

## 2020-01-01 RX ADMIN — SODIUM CHLORIDE, PRESERVATIVE FREE 3 ML: 5 INJECTION INTRAVENOUS at 13:30

## 2020-01-01 RX ADMIN — DEXAMETHASONE SODIUM PHOSPHATE 4 MG: 4 INJECTION, SOLUTION INTRAMUSCULAR; INTRAVENOUS at 14:45

## 2020-02-28 NOTE — THERAPY DISCHARGE
Zandra Pagan  : 1945  [place]  Secondary: [address] at [place] [address] [place] [address].  [phone]   [phone]          OUTPATIENT PHYSICAL THERAPY:Discontinuation Summary    ICD-10: Treatment Diagnosis:    Other abnormalities of gait and mobility (R26.89)   Difficulty in walking, not elsewhere classified (R26.2)   Precautions/Allergies:   Patient has no known allergies. TREATMENT PLAN:  Effective Dates: 2019 TO 3/5/2020 (90 days). Frequency/Duration: 2 times a week for 90 Day(s) MEDICAL/REFERRING DIAGNOSIS:  Unspecified convulsions [R56.9]   DATE OF ONSET:   REFERRING PHYSICIAN: Kailey Garcia MD MD Orders: Evaluate and Treat  Return MD Appointment: TBD     ASSESSMENT:  Mr. Mariana Quintanilla presented with decreased mobility and decreased safety with ambulation. Patient attended a total of 4 scheduled physical therapy visits including initial evaluation on 2019. Treatment has consisted of balance and strengthening activities to improve overall mobility, safety, and performance with activities of daily living. Patient did not attend any additional physical therapy visits secondary to unknown reasons. Patient's therapy will be discontinued at this time. We will be happy to re-assess him with a change in his status and a new order from his doctor. Thank you for the opportunity to serve this patient. GOALS: (Goals have been discussed and agreed upon with patient.)  Short-Term Functional Goals: Time Frame: 30 days  1. Patient will be independent with home exercise program without exacerbation of symptoms or cueing needed--goal unmet. Discharge Goals: Time Frame: 90 days  1. Patient will be independent with all ADLs with minimal fear of falling and loss of balance with daily tasks--goal unmet.   2. Patient will report no fear avoidance with social or recreational activities due to fear of falling--goal unmet. 3. Patient will score greater than or equal to 45/56 on Bonilla Balance Scale with minimal effect of imbalance or fatigue on patient's ability to manage every day life activities--goal unmet. 4. Patient will score less than or equal to 15 seconds on Timed Up and Go with minimal effect of imbalance or fatigue on patient's ability to manage every day life activities--goal unmet. OUTCOME MEASURE:   Tool Used: Bonilla Balance Scale  Score:  Initial: 24/56 Most Recent: X/56 (Date: -- )   Interpretation of Score: Each section is scored on a 0-4 scale, 0 representing the patients inability to perform the task and 4 representing independence. The scores of each section are added together for a total score of 56. The higher the patients score, the more independent the patient is. Any score below 45 indicates increased risk for falls. Tool Used: Timed Up and Go (TUG)  Score:  Initial: 25 seconds Most Recent: X seconds (Date: -- )   Interpretation of Score: The test measures, in seconds, the time taken by an individual to stand up from a standard arm chair (seat height 46 cm [18 in], arm height 65 cm [25.6 in]), walk a distance of 3 meters (118 in, approx 10 ft), turn, walk back to the chair and sit down. If the individual takes longer than 14 seconds to complete TUG, this indicates risk for falls.      EXAMINATION:   Observation/Orthostatic Postural Assessment:          Posture Assessment: Rounded shoulders, Forward head   Functional Mobility:         Gait/Mobility:    · Base of Support: Center of gravity altered  · Speed/Henna: Pace decreased (<100 feet/min)  · Step Length: Left shortened, Right shortened  · Swing Pattern: Left symmetrical, Right symmetrical  · Stance: Left increased, Right increased  · Gait Abnormalities: Antalgic  · Ambulation - Level of Assistance: Modified independent  · Assistive Device: Walker, rolling  · Interventions: Verbal cues, Safety awareness training          Transfers:     · Sit to Stand: Modified independent  · Stand to Sit: Modified independent  · Stand Pivot Transfers: Modified independent  · Bed to Chair: Modified independent  · Lateral Transfers: Modified independent         Bed Mobility:     · Rolling: Modified independent  · Supine to Sit: Modified independent  · Sit to Supine: Modified independent  · Scooting: Modified independent       Strength:          UE STRENGTH: 3/5 shoulder flexion, 3/5 shoulder abduction, 3/5 shoulder extension, 3/5 elbow flexion, 3/5 elbow extension. LE STRENGTH:  4/5 hip flexion, 4/5 hip abduction, 4/5 hip adduction, 4/5 hip extension, 4/5 knee extension, 4/5 knee flexion, 4/5 ankle dorsiflexion, 3/5 ankle plantarflexion, 3/5 ankle inversion, 3/5 ankle eversion.   Sensation:         Intact  Postural Control & Balance:  · Bonilla Balance Scale:  25/56.   (A score less than 45/56 indicates high risk of falls)

## 2020-03-02 PROBLEM — G45.9 TIA (TRANSIENT ISCHEMIC ATTACK): Status: RESOLVED | Noted: 2019-01-01 | Resolved: 2020-01-01

## 2020-03-02 PROBLEM — I62.9 INTRACRANIAL HEMORRHAGE (HCC): Status: ACTIVE | Noted: 2020-01-01

## 2020-03-02 PROBLEM — R53.83 FATIGUE: Status: RESOLVED | Noted: 2019-01-01 | Resolved: 2020-01-01

## 2020-03-02 PROBLEM — G40.901 STATUS EPILEPTICUS (HCC): Status: RESOLVED | Noted: 2019-01-01 | Resolved: 2020-01-01

## 2020-03-02 PROBLEM — I69.30 SEQUELAE, POST-STROKE: Chronic | Status: ACTIVE | Noted: 2020-01-01

## 2020-03-02 PROBLEM — R56.9 SEIZURES (HCC): Status: RESOLVED | Noted: 2019-01-01 | Resolved: 2020-01-01

## 2020-03-02 NOTE — PROGRESS NOTES
Pt with SBP >140 maxed on cardene at 15mcg. Continuing with emesis. Difficulty clearing secretions. Unable to pass STAND. Dr. Susan Durbin notified. Orders for phenergan IV and labetalol IV. Will administer and continue to monitor.

## 2020-03-02 NOTE — PROGRESS NOTES
LEAPFROG PROTOCOL NOTE Helio Salgadorodríguez 3/2/2020 The patient is currently in the critical care setting managed by Dr. Sandi Saunders with [address]. The patient's chart is reviewed and the patient is discussed with the staff. Patient is currently hemodynamically stable. Patient has no needs identified for Intensivist management in the critical care setting at this time. Please notify us if can be of assistance. No charge billed to the patient. Thank you.  
 
Bravo Cox MD

## 2020-03-02 NOTE — ED NOTES
TRANSFER - OUT REPORT: 
 
Verbal report given to [address] on Yuli Donato  being transferred to [address] [place] [address] for routine progression of care Report consisted of patients Situation, Background, Assessment and  
Recommendations(SBAR). Information from the following report(s) SBAR, ED Summary, MAR and Dual Neuro Assessment was reviewed with the receiving nurse. Lines:  
Peripheral IV 03/02/20 Left Antecubital (Active) Site Assessment Clean, dry, & intact 3/2/2020  2:53 AM  
Phlebitis Assessment 0 3/2/2020  2:53 AM  
Infiltration Assessment 0 3/2/2020  2:53 AM  
Dressing Status Clean, dry, & intact 3/2/2020  2:53 AM  
   
Peripheral IV 03/02/20 Right Antecubital (Active) Site Assessment Clean, dry, & intact 3/2/2020  2:53 AM  
Phlebitis Assessment 0 3/2/2020  2:53 AM  
Infiltration Assessment 0 3/2/2020  2:53 AM  
Dressing Status Clean, dry, & intact 3/2/2020  2:53 AM  
  
 
Opportunity for questions and clarification was provided. Patient transported with: 
 Monitor O2 @ 3 liters Registered Nurse

## 2020-03-02 NOTE — ED TRIAGE NOTES
Pt arrives from home via [facility], called out for stroke. Pt presents alert, non-verbal, unable to follow commands. Pt has hx of CVA with LLE weakness only. LKW 1 hour prior.

## 2020-03-02 NOTE — INTERDISCIPLINARY ROUNDS
Interdisciplinary team rounds were held 3/2/2020 with the following team members:Nursing, Nurse Practitioner, Nutrition, Palliative Care, Pastoral Care, Pharmacy, Physical Therapy, Physician, Respiratory Therapy and Clinical Coordinator and the patient. Plan of care discussed. See clinical pathway and/or care plan for interventions and desired outcomes.

## 2020-03-02 NOTE — PROGRESS NOTES
HOSPITALIST NOTE 
 
NAME:  Karla Gann Age:  66 y.o. 
:   1945 MRN:   [id number] PCP: Missy Eric MD 
Consulting MD: Treatment Team: Attending Provider: Navid Waddell MD 
 
CHIEF COMPLAINT: inability to follow commands HISTORY OF PRESENT ILLNESS:  
Karla Gann is a 66 y.o. male with a past medical history of CAD, vascular dementia, seizure disorder, previous CVA, previous ICH in 2019 admitted on 2020 with report of RUL tremor, R gaze deviation, and inability to follow commands. ER work-up showed large right frontal intraparenchymal hemorrhage measuring 16 to 4.9 cm along with right temporal hyperdense mass measuring 1.1 to 1 cm with vasogenic edema in right temporal lobe 2020: Patient seen at bedside, is nonverbal, does not open eyes on verbal commands or painful stimuli. Patient's wife is at bedside discussed about goals of care. As per the wife patient's quality of life has been declining over the past few months. She understands that patient is at high risk of decompensation and less likelihood of him returning back to baseline. She does not wish for any aggressive life prolonging measures including chest compressions or intubation. Patient is currently DNR/DNI, no artificial means of nutrition desired. REVIEW OF SYSTEMS: Comprehensive ROS unable to be performed given nonverbal status Objective:  
 
Visit Vitals /69 Pulse 89 Temp 98.3 °F (36.8 °C) Resp 23 Ht 5' 9\" (1.753 m) Wt 81.6 kg (180 lb) SpO2 97% BMI 26.58 kg/m² Temp (24hrs), Av.5 °F (36.9 °C), Min:98.3 °F (36.8 °C), Max:98.7 °F (37.1 °C) Oxygen Therapy O2 Sat (%): 97 % (20 07) Pulse via Oximetry: 93 beats per minute (20 07) O2 Device: Nasal cannula (20) O2 Flow Rate (L/min): 3 l/min (20) Physical Exam: 
General:    Early male, no acute distress, nonverbal, somnolent Head:   Normocephalic/atraumatic. Eyes:  No palpebral pallor or scleral icterus. ENT:  External auricular and nasal exam within normal limits. Mucous membranes are moist. 
Neck:  Supple, non-tender, no JVD. Lungs:   Clear to auscultation bilaterally without wheezes or crackles. No respiratory distress or accessory muscle use. Heart:   Regular rate and rhythm, without murmurs, rubs, or gallops. Abdomen:   Soft, non-tender, non-distended with normoactive bowel sounds. Genitourinary: No tenderness over the bladder or bilateral CVAs. Extremities: Without clubbing, cyanosis, or edema. Skin:     Normal color, texture, and turgor. No rashes, lesions, or jaundice. Pulses: Radial and dorsalis pedis pulses present 2+ bilaterally. Capillary refill <2s. Neurologic: Unable to assess as patient is not following commands and barely responding to painful stimuli Psychiatric: Nonverbal 
 
Data Review:  
Recent Results (from the past 24 hour(s)) POC PT/INR Collection Time: 03/02/20  1:47 AM  
Result Value Ref Range Prothrombin time (POC) 12.1 (H) 9.6 - 11.6 SECS  
 INR (POC) 1.0 0.9 - 1.2 GLUCOSE, POC Collection Time: 03/02/20  1:48 AM  
Result Value Ref Range Glucose (POC) 125 (H) 65 - 100 mg/dL CBC WITH AUTOMATED DIFF Collection Time: 03/02/20  2:37 AM  
Result Value Ref Range WBC 12.3 (H) 4.3 - 11.1 K/uL  
 RBC 4.59 4.23 - 5.6 M/uL  
 HGB 12.8 (L) 13.6 - 17.2 g/dL HCT 40.7 (L) 41.1 - 50.3 % MCV 88.7 79.6 - 97.8 FL  
 MCH 27.9 26.1 - 32.9 PG  
 MCHC 31.4 31.4 - 35.0 g/dL  
 RDW 13.3 11.9 - 14.6 % PLATELET 947 590 - 794 K/uL MPV 9.3 (L) 9.4 - 12.3 FL ABSOLUTE NRBC 0.00 0.0 - 0.2 K/uL  
 DF AUTOMATED NEUTROPHILS 90 (H) 43 - 78 % LYMPHOCYTES 6 (L) 13 - 44 % MONOCYTES 3 (L) 4.0 - 12.0 % EOSINOPHILS 0 (L) 0.5 - 7.8 % BASOPHILS 0 0.0 - 2.0 % IMMATURE GRANULOCYTES 0 0.0 - 5.0 %  
 ABS. NEUTROPHILS 11.0 (H) 1.7 - 8.2 K/UL  
 ABS. LYMPHOCYTES 0.7 0.5 - 4.6 K/UL ABS. MONOCYTES 0.4 0.1 - 1.3 K/UL  
 ABS. EOSINOPHILS 0.0 0.0 - 0.8 K/UL  
 ABS. BASOPHILS 0.0 0.0 - 0.2 K/UL  
 ABS. IMM. GRANS. 0.1 0.0 - 0.5 K/UL METABOLIC PANEL, COMPREHENSIVE Collection Time: 03/02/20  2:37 AM  
Result Value Ref Range Sodium 145 136 - 145 mmol/L Potassium 3.2 (L) 3.5 - 5.1 mmol/L Chloride 107 98 - 107 mmol/L  
 CO2 34 (H) 21 - 32 mmol/L Anion gap 4 (L) 7 - 16 mmol/L Glucose 146 (H) 65 - 100 mg/dL BUN 16 8 - 23 MG/DL Creatinine 0.82 0.8 - 1.5 MG/DL  
 GFR est AA >60 >60 ml/min/1.73m2 GFR est non-AA >60 >60 ml/min/1.73m2 Calcium 8.4 8.3 - 10.4 MG/DL Bilirubin, total 0.3 0.2 - 1.1 MG/DL  
 ALT (SGPT) 27 12 - 65 U/L  
 AST (SGOT) 27 15 - 37 U/L Alk. phosphatase 185 (H) 50 - 136 U/L Protein, total 7.1 6.3 - 8.2 g/dL Albumin 3.5 3.2 - 4.6 g/dL Globulin 3.6 (H) 2.3 - 3.5 g/dL A-G Ratio 1.0 (L) 1.2 - 3.5 PROTHROMBIN TIME + INR Collection Time: 03/02/20  2:37 AM  
Result Value Ref Range Prothrombin time 13.7 12.0 - 14.7 sec INR 1.0    
TROPONIN I Collection Time: 03/02/20  2:37 AM  
Result Value Ref Range Troponin-I, Qt. <0.02 (L) 0.02 - 0.05 NG/ML  
PHENYTOIN Collection Time: 03/02/20  2:37 AM  
Result Value Ref Range Phenytoin 4.2 (L) 10 - 20 ug/mL EKG, 12 LEAD, INITIAL Collection Time: 03/02/20  2:39 AM  
Result Value Ref Range Ventricular Rate 86 BPM  
 Atrial Rate 86 BPM  
 P-R Interval 200 ms QRS Duration 102 ms Q-T Interval 408 ms QTC Calculation (Bezet) 488 ms Calculated P Axis 76 degrees Calculated R Axis 19 degrees Calculated T Axis 73 degrees Diagnosis    
  !! AGE AND GENDER SPECIFIC ECG ANALYSIS !! Normal sinus rhythm Prolonged QT Abnormal ECG No previous ECGs available METABOLIC PANEL, BASIC Collection Time: 03/02/20  5:36 AM  
Result Value Ref Range Sodium 145 136 - 145 mmol/L Potassium 3.3 (L) 3.5 - 5.1 mmol/L  Chloride 110 (H) 98 - 107 mmol/L  
 CO2 29 21 - 32 mmol/L Anion gap 6 (L) 7 - 16 mmol/L Glucose 164 (H) 65 - 100 mg/dL BUN 15 8 - 23 MG/DL Creatinine 0.81 0.8 - 1.5 MG/DL  
 GFR est AA >60 >60 ml/min/1.73m2 GFR est non-AA >60 >60 ml/min/1.73m2 Calcium 8.8 8.3 - 10.4 MG/DL  
CBC WITH AUTOMATED DIFF Collection Time: 03/02/20  5:36 AM  
Result Value Ref Range WBC 15.1 (H) 4.3 - 11.1 K/uL  
 RBC 4.82 4.23 - 5.6 M/uL  
 HGB 13.6 13.6 - 17.2 g/dL HCT 43.2 41.1 - 50.3 % MCV 89.6 79.6 - 97.8 FL  
 MCH 28.2 26.1 - 32.9 PG  
 MCHC 31.5 31.4 - 35.0 g/dL  
 RDW 13.2 11.9 - 14.6 % PLATELET 771 914 - 563 K/uL MPV 9.3 (L) 9.4 - 12.3 FL ABSOLUTE NRBC 0.00 0.0 - 0.2 K/uL  
 DF AUTOMATED NEUTROPHILS 93 (H) 43 - 78 % LYMPHOCYTES 3 (L) 13 - 44 % MONOCYTES 4 4.0 - 12.0 % EOSINOPHILS 0 (L) 0.5 - 7.8 % BASOPHILS 0 0.0 - 2.0 % IMMATURE GRANULOCYTES 0 0.0 - 5.0 %  
 ABS. NEUTROPHILS 14.0 (H) 1.7 - 8.2 K/UL  
 ABS. LYMPHOCYTES 0.5 0.5 - 4.6 K/UL  
 ABS. MONOCYTES 0.6 0.1 - 1.3 K/UL  
 ABS. EOSINOPHILS 0.0 0.0 - 0.8 K/UL  
 ABS. BASOPHILS 0.0 0.0 - 0.2 K/UL  
 ABS. IMM. GRANS. 0.1 0.0 - 0.5 K/UL Imaging /Procedures /Studies: 
Ct Code Neuro Head Wo Contrast 
 
Result Date: 3/2/2020 IMPRESSION: Right frontal hematoma. Right lateral intraventricular hemorrhage. Given the other findings I suspect hemorrhagic metastatic disease. Date of Dictation: 3/2/2020 2:15 AM  
  
 
 
Assessment and Plan:  
 
Principal Problem: 
  Intracranial hemorrhage ([address] (3/2/2020) Patient is DNR/DNI Is currently on comfort measures Hospice consulted Code Status: DNR Dispo: Transfer to regular floor Discharge plan pending until hospice has evaluate the patient. Signed By: Augusto Russell MD   
 March 2, 2020

## 2020-03-02 NOTE — ED PROVIDER NOTES
76-year-old male presenting from home for tremor of the right upper extremity, right gaze deviation and inability to follow commands. Story is being given by EMS and the wife. Patient has a history of vascular dementia and a history of ischemic stroke in 2018 with hemorrhage in 2019, accompanied by subsequent seizure disorder for which she is taking Dilantin. Throughout EMS transport the patient was non-communicative and uncooperative. He had a persistent right gaze deviation with tremor of the right upper extremity. He vomited throughout the trip as well. Patient's blood pressure was exceedingly elevated in the 437E systolic. Patient was given no medications. This facility was contacted of the patient's impending arrival and requested that we activate a stroke alert for this patient. The history is provided by the EMS personnel. Past Medical History:  
Diagnosis Date  Anemia  Arthritis  Autoimmune disease ([facility] [address]  CAD (coronary artery disease)  Cancer [facility]) 2009  
 prostate  Hypertension  Seizures ([facility] [address]  Stroke ([facility] [address] Past Surgical History:  
Procedure Laterality Date  CARDIAC SURG PROCEDURE UNLIST    
 one stent  HX APPENDECTOMY  HX COLONOSCOPY  2018  
 colon polyps  HX ORTHOPAEDIC    
 knee surgeries  HX PROSTATECTOMY  2009 Family History:  
Problem Relation Age of Onset  Cancer Mother  Hypertension Father  Heart Disease Father  Kidney Disease Father  Hypertension Sister Social History Socioeconomic History  Marital status:  Spouse name: Not on file  Number of children: Not on file  Years of education: Not on file  Highest education level: Not on file Occupational History  Not on file Social Needs  Financial resource strain: Not on file  Food insecurity:  
  Worry: Not on file Inability: Not on file  Transportation needs:  
  Medical: Not on file Non-medical: Not on file Tobacco Use  Smoking status: Former Smoker Last attempt to quit: 1965 Years since quittin.2  Smokeless tobacco: Never Used Substance and Sexual Activity  Alcohol use: Not Currently  Drug use: Never  Sexual activity: Not on file Lifestyle  Physical activity:  
  Days per week: Not on file Minutes per session: Not on file  Stress: Not on file Relationships  Social connections:  
  Talks on phone: Not on file Gets together: Not on file Attends Buddhist service: Not on file Active member of club or organization: Not on file Attends meetings of clubs or organizations: Not on file Relationship status: Not on file  Intimate partner violence:  
  Fear of current or ex partner: Not on file Emotionally abused: Not on file Physically abused: Not on file Forced sexual activity: Not on file Other Topics Concern  Not on file Social History Narrative  Not on file ALLERGIES: Patient has no known allergies. Review of Systems Gastrointestinal: Positive for vomiting. Neurological: Positive for tremors, seizures, facial asymmetry and speech difficulty. All other systems reviewed and are negative. Vitals:  
 20 0330 20 6613 20 0350 20 1355 BP: 179/80 179/80 161/70 155/65 Pulse: 93 94 90 88 Resp: 20  20 19 Temp:      
SpO2: 100%  100% 100% Physical Exam 
Vitals signs and nursing note reviewed. Constitutional:   
   Appearance: He is well-developed. He is ill-appearing. HENT:  
   Head: Normocephalic and atraumatic. Nose: Nose normal.  
   Mouth/Throat:  
   Mouth: Mucous membranes are moist.  
   Comments: Actively vomiting upon arrival 
Eyes:  
   Conjunctiva/sclera: Conjunctivae normal.  
   Comments: Right gaze deviation Neck: Musculoskeletal: Normal range of motion and neck supple. Cardiovascular: Rate and Rhythm: Regular rhythm. Tachycardia present. Heart sounds: Normal heart sounds. Pulmonary:  
   Effort: Pulmonary effort is normal.  
   Breath sounds: Rhonchi present. Abdominal:  
   General: Bowel sounds are normal.  
   Palpations: Abdomen is soft. Musculoskeletal: Normal range of motion. General: No deformity. Skin: 
   General: Skin is warm and dry. Neurological:  
   Mental Status: He is alert. He is disoriented. Cranial Nerves: No cranial nerve deficit. MDM Number of Diagnoses or Management Options Hypertensive emergency:  
Intracranial hemorrhage [facility]):  
Diagnosis management comments: 64-year-old male presenting for possible CVA versus seizure. Stroke alert activated upon arrival. 
 
  
Amount and/or Complexity of Data Reviewed Clinical lab tests: ordered and reviewed (Results for orders placed or performed during the hospital encounter of 03/02/20 
-CBC WITH AUTOMATED DIFF Result                      Value             Ref Range WBC                         12.3 (H)          4.3 - 11.1 K* 
     RBC                         4.59              4.23 - 5.6 M* HGB                         12.8 (L)          13.6 - 17.2 * HCT                         40.7 (L)          41.1 - 50.3 % MCV                         88.7              79.6 - 97.8 * MCH                         27.9              26.1 - 32.9 * MCHC                        31.4              31.4 - 35.0 * RDW                         13.3              11.9 - 14.6 % PLATELET                    187               150 - 450 K/* MPV                         9.3 (L)           9.4 - 12.3 FL ABSOLUTE NRBC               0.00              0.0 - 0.2 K/* DF                          AUTOMATED NEUTROPHILS                 90 (H)            43 - 78 % LYMPHOCYTES                 6 (L)             13 - 44 % MONOCYTES                   3 (L)             4.0 - 12.0 % EOSINOPHILS                 0 (L)             0.5 - 7.8 % BASOPHILS                   0                 0.0 - 2.0 % IMMATURE GRANULOCYTES       0                 0.0 - 5.0 %   
     ABS. NEUTROPHILS            11.0 (H)          1.7 - 8.2 K/* ABS. LYMPHOCYTES            0.7               0.5 - 4.6 K/* ABS. MONOCYTES              0.4               0.1 - 1.3 K/* ABS. EOSINOPHILS            0.0               0.0 - 0.8 K/* ABS. BASOPHILS              0.0               0.0 - 0.2 K/* ABS. IMM. GRANS.            0.1               0.0 - 0.5 K/* 
-METABOLIC PANEL, COMPREHENSIVE Result                      Value             Ref Range Sodium                      145               136 - 145 mm* Potassium                   3.2 (L)           3.5 - 5.1 mm* Chloride                    107               98 - 107 mmo* CO2                         34 (H)            21 - 32 mmol* Anion gap                   4 (L)             7 - 16 mmol/L Glucose                     146 (H)           65 - 100 mg/* BUN                         16                8 - 23 MG/DL Creatinine                  0.82              0.8 - 1.5 MG* 
     GFR est AA                  >60               >60 ml/min/1* GFR est non-AA              >60               >60 ml/min/1* Calcium                     8.4               8.3 - 10.4 M* Bilirubin, total            0.3               0.2 - 1.1 MG* ALT (SGPT)                  27                12 - 65 U/L   
     AST (SGOT)                  27                15 - 37 U/L Alk. phosphatase            185 (H)           50 - 136 U/L Protein, total              7.1               6.3 - 8.2 g/* Albumin                     3.5               3.2 - 4.6 g/*      Globulin                    3.6 (H)           2.3 - 3.5 g/* 
 A-G Ratio                   1.0 (L)           1.2 - 3.5     
-PROTHROMBIN TIME + INR Result                      Value             Ref Range Prothrombin time            13.7              12.0 - 14.7 * INR                         1.0                             
-TROPONIN I Result                      Value             Ref Range Troponin-I, Qt.             <0.02 (L)         0.02 - 0.05 * 
-PHENYTOIN Result                      Value             Ref Range Phenytoin                   4.2 (L)           10 - 20 ug/mL 
-POC PT/INR Result                      Value             Ref Range Prothrombin time (POC)      12.1 (H)          9.6 - 11.6 S* INR (POC)                   1.0               0.9 - 1.2     
-GLUCOSE, POC Result                      Value             Ref Range Glucose (POC)               125 (H)           65 - 100 mg/* 
 
) Tests in the radiology section of CPT®: ordered and reviewed (Ct Code Neuro Head Wo Contrast 
 
Result Date: 3/2/2020 CT HEAD WITHOUT CONTRAST HISTORY:  CVA. COMPARISON: None TECHNIQUE: Axial imaging was performed without intravenous contrast utilizing 5mm slice thickness. Sagittal and coronal reformats were performed. Radiation dose reduction techniques were used for this study. Our CT scanner uses one or all of the following: Automated exposure control, adjustment of the MAS or KUB according to patient's size and iterative reconstruction. FINDINGS:    *BRAIN:    -  Right frontal intraparenchymal hematoma measuring 6.0 x 4.9 cm. Right-sided intraventricular hemorrhage.    -  Right temporal hyperdense mass measuring 1.1 x 1.0 cm. Vasogenic edema in left temporal lobe. -  No gross white matter abnormality by CT. *VISUALIZED PARANASAL SINUSES: Well aerated. *MASTOIDS:  Clear. *CALVARIUM AND SCALP: Unremarkable. IMPRESSION: Right frontal hematoma.  Right lateral intraventricular hemorrhage. Given the other findings I suspect hemorrhagic metastatic disease. Date of Dictation: 3/2/2020 2:15 AM  
 
) Tests in the medicine section of CPT®: reviewed and ordered Decide to obtain previous medical records or to obtain history from someone other than the patient: yes Review and summarize past medical records: yes (75 yom with hx of CAD. PAD and vascular dementia presents for evaluation and to establish care with a caridologist in [place]. He listed in [facility] and was cared for there. He had a PCI to unknown vessel about 10 years ago and had some angioplasty to his left (unknown which leg) about 5 years go. He suffered a stroke in May of 18 and it converted to hemorragic in Sep of 19 when he suffered some seizures. His dementia has progressed and now he is on seroquel for violent outbursts.) Discuss the patient with other providers: yes (Discussed the case with the neurosurgeon on-call Discussed the case with the hospitalist who will assume care) Independent visualization of images, tracings, or specimens: yes (Personally reviewed the patient's imaging) Risk of Complications, Morbidity, and/or Mortality Presenting problems: high Diagnostic procedures: high Management options: high General comments: Patient's blood pressure has begrudgingly responded to the nicardipine. Blood work seems unremarkable. The case was discussed with the neurosurgeon on-call who feels that no surgical intervention is required at this time given the patient's age and comorbidities it would likely result in increased likelihood of death. Patient's information was also reviewed by the interventional neurology team who feels that there is no coiling or other intervention that can be performed on this patient. As a result, patient will be admitted to the hospital service to the ICU for better blood pressure control and monitoring. I personally reviewed the patient's vital signs, laboratory tests, and/or radiological findings. I discussed these findings with the patient and their significance. I answered all questions and explained that given these findings there is significant concern for increased morbidity and/or mortality without immediate intervention. As a result, I recommended admission to the hospital, consulted the appropriate service, and transitioned care to that service in improved condition Critical Care Total time providing critical care: 30-74 minutes ED Course as of Mar 02 0400 Mon Mar 02, 2020  
0158 Large right frontal intraparenchymal hemorrhage on noncontrast head CT. Ordering nicardipine. [JS] 0215 Ordering 8 of Zofran as the patient has had multiple episodes of vomiting. [JS]  
6735 Informed charge nurse that the patient's blood work is not in process and appears that the tubes have been misplaced so we will have to redraw. [JS] [phone] with the neurosurgeon on-call Dr. Edmond Hernandez who currently recommends medical management with blood pressure control. Given the patient's comorbidities and age his risk of morbidity mortality is quite high but surgical craniectomy in this patient with increases risk. [JS] 0240 EKG performed and shows a normal sinus rhythm rate 86, left axis deviation NM is 200, QRS is 102, QTc is 488 with no acute ischemic change [JS] 0241 Essentially unchanged from prior EKG except for prolongation of intervals. [JS] [address] [facility]  [address] arrived. I informed them of our findings and it appears that the patient's hemorrhage is in the same area that it happened before. [JS] 9255 I did ask about CODE STATUS and the patient has made statements in the past and the family agrees that if he were to stop breathing or his heart stop pumping he would not want to be resuscitated. His wife who is the power of  states that there is a signed DNR form [JS] ED Course User Index [JS] Kayla Che MD  
 
 
CRITICAL CARE (ASAP ONLY) Performed by: Kayla Che MD 
Authorized by: Kayla Che MD  
 
Critical care provider statement:  
  Critical care time (minutes):  55 
  Critical care start time:  3/2/2020 2:30 AM 
  Critical care end time:  3/2/2020 3:25 AM 
  Critical care time was exclusive of:  Separately billable procedures and treating other patients Critical care was necessary to treat or prevent imminent or life-threatening deterioration of the following conditions:  CNS failure or compromise Critical care was time spent personally by me on the following activities:  Blood draw for specimens, ordering and performing treatments and interventions, development of treatment plan with patient or surrogate, ordering and review of laboratory studies, discussions with consultants, ordering and review of radiographic studies, pulse oximetry, evaluation of patient's response to treatment, re-evaluation of patient's condition, examination of patient, review of old charts and obtaining history from patient or surrogate   I assumed direction of critical care for this patient from another provider in my specialty: no

## 2020-03-02 NOTE — PROGRESS NOTES
Primary Nurse Becca Valentin, RN performed a dual skin assessment on this patient No impairment noted Jerry score is 12

## 2020-03-02 NOTE — PROGRESS NOTES
TRANSFER - IN REPORT: 
 
Verbal report received from [employer] (name) on Claudeen Reil  being received from ED (unit) for routine progression of care Report consisted of patients Situation, Background, Assessment and  
Recommendations(SBAR). Information from the following report(s) SBAR, Kardex, ED Summary and MAR was reviewed with the receiving nurse. Opportunity for questions and clarification was provided. Assessment completed upon patients arrival to unit and care assumed.

## 2020-03-02 NOTE — PROGRESS NOTES
Discussed with patient's wife that [id number] [facility] will come later and speak with her. She states that she has spoken with the attending MD already about Hospice. MECHELLE has spoken with Rajan Avila with [address] and he states he will see the patient today.

## 2020-03-02 NOTE — HOSPICE
Met with the patient's spouse, Lidia Rivas, and their adult son at the patient's bedside. The patient would open his eyes with a jolt occasionally but did no appear that e was comprehending what was going on. We discussed the criteria for Hospice Care and the Hospice Philosophy. We discussed the services provided with [facility] ([address] We discussed the criteria for General In Patient (GIP) care at the [facility]). They said that they would like Sharron Rehman transferred to the [facility]. Reviewed the patient's EMR and treatments with Dr. Pranay Gamboa MD the [address] [facility]) Medical Director who determined the patient meets criteria for [facility] Care. All consents were signed. I gave [facility] the bedside RN a DNR signed by Lidia Rivas so the discharging physician could sign it to be used when transporting Sharron Rehman via ambulance to the [facility]. The family is aware that at present there are no beds available at the [facility]. A [facility] Intake Nurse will contact the bedside nurse/ when a bed becomes available so transportation can be arranged. Thank you for this referral, 
 
Renny Carlos RN, BSN Community Nurse Liaison [facility] [phone]

## 2020-03-02 NOTE — DISCHARGE SUMMARY
Hospitalist Discharge Summary Patient ID: 
Toma Scherer 
[id number] 
75 y.o. 
1945 Admit date: 3/2/2020  1:41 AM 
Discharge date and time: 3/2/2020 Attending: Genevieve Lambert MD 
PCP:  Ata Escalera MD 
Treatment Team: Attending Provider: Genevieve Lambert MD 
 
Principal Diagnosis Intracranial hemorrhage ([address] Principal Problem: 
  Intracranial hemorrhage ([address] (3/2/2020) Active Problems: 
  History of CVA (cerebrovascular accident) (9/26/2019) HTN (hypertension) (9/26/2019) HLD (hyperlipidemia) (9/26/2019) CAD (coronary artery disease) (9/26/2019) Seizure disorder ([address] (10/1/2019) Vascular dementia with behavior disturbance ([address] (12/3/2019) Hospital Course: 
Please refer to the admission H&P for details of presentation. In summary, the patient is 66 y.o. male with a past medical history of CAD, vascular dementia, seizure disorder, previous CVA, previous ICH in 2019 admitted on March 2, 2020 with report of RUL tremor, R gaze deviation, and inability to follow commands. ER work-up showed large right frontal intraparenchymal hemorrhage measuring 16 to 4.9 cm along with right temporal hyperdense mass measuring 1.1 to 1 cm with vasogenic edema in right temporal lobe. Patient seen at bedside, is nonverbal, does not open eyes on verbal commands or painful stimuli. Patient's wife is at bedside discussed about goals of care. As per the wife patient's quality of life has been declining over the past few months. She understands that patient is at high risk of decompensation and less likelihood of him returning back to baseline. She does not wish for any aggressive life prolonging measures including chest compressions or intubation. Patient is currently DNR/DNI, no artificial means of nutrition desired. Hospice was consulted, pt accepted for [facility] [place], will be transferred at 2100 hrs. Rest of the hospital course was uneventful. Significant Diagnostic Studies: KUB 
  
CLINICAL INDICATION: Evaluate NG tube 
  
FINDINGS: Single supine view of the upper abdomen shows an NG tube in good 
position. 
  
IMPRESSION IMPRESSION: NG tube in satisfactory position. HISTORY: Parenchymal of age Male withintraparenchymal hemorrhage. Patient with a 
history of prior right frontal lobe infarct with encephalomalacia. 
  
EXAM: CTA CODE NEURO HEAD AND NECK W CONT. Radiation reduction dose techniques 
were used for the study. Our CT scanner use one or all of the following- 
Automated exposure control, adjustment of the mA and/or KV according the patient 
size, iterative reconstruction. 3-D multiplanar reformations were performed at 
the workstation. All carotid artery stenosis percentages are based upon NASCET 
criteria if appropriate. 
  
COMPARISON: CTA head and neck exam on 9/26/2019. Noncontrast CT head exam on 
3/2/2020 performed just prior to this study. MR brain exam on 9/26/2019. 
  
3/2/2020 noncontrast CT head examination demonstrated a large volume mid to high 
convexity right frontal lobe intraparenchymal hemorrhage with associated mass 
effect on the right lateral ventricle with no midline shift. There was also a 
smaller focus of hemorrhage in the lateral right ventricle. There was also 
intraventricular hemorrhage in the right lateral ventricle. 
  
FINDINGS:  
VASCULAR FINDINGS: 
Cervical CTA: 
There is a three-vessel branching pattern of the aortic arch. There is minimal 
atherosclerotic disease of the aortic arch and at the origin of the arch branch 
vessels. The right subclavian artery is patent where visualized. The left 
subclavian artery is patent where visualized. 
  
The right common carotid artery is patent with mild atherosclerotic disease 
distally. The right external carotid artery is patent. 
  
There is mild atherosclerotic disease of the right carotid bulb without significant stenosis. There is similar moderate noncalcific plaque of the right ICA near the origin with approximately 40-50 percent stenosis in this region. The cervical right ICA is patent. 
  
The left vertebral artery is dominant. The cervical right vertebral artery is 
patent and moderately stenosed at the origin due to atherosclerotic disease. 
  
The left common carotid artery is patent without stenosis. The left external 
carotid artery is patent. 
  
There is mild atherosclerotic disease of the left carotid bulb without stenosis. There is moderate atherosclerotic disease of the left ICA origin with 
approximately 50% stenosis in this region. The cervical left ICA is patent. 
  
The cervical left vertebral artery is patent. 
  
No evidence of flow-limiting stenosis in the major cervical arterial vascular. 
  
Cranial CTA: 
The bilateral intracranial internal carotid arteries are patent. Minimal 
atherosclerotic disease of the bilateral cavernous ICAs.   
The bilateral A1 segments are patent. The bilateral A2 and A3 segments are 
patent. 
  
The right M1 is patent. The major proximal right MCA branches beyond the 
bifurcation are patent. The left M1 is patent. The major proximal left MCA 
branches beyond the bifurcation are patent. 
  
The intracranial vertebral arteries are patent. The basilar artery is patent. 
  
The right and left posterior cerebral arteries are patent. 
  
There is no evidence of intracranial aneurysms. 
  
NONVASCULAR FINDINGS: 
Head: 
There is a similar brain parenchymal pattern to prior. There is redemonstration 
of the large volume mid to high convexity right frontal lobe acute 
intraparenchymal hemorrhage/hematoma which causes mass effect on the frontal 
horn of the right lateral ventricle. There is also limited evaluation of the 
smaller focus of intraparenchymal hemorrhage/hematoma in the right lateral 
ventricle without significant mass effect.  There is no significant midline shift. There are prominent chronic ischemic white matter demyelination changes. There is limited evaluation of the previously noted right frontal lobe 
encephalomalacia defect. There is limited evaluation of the previously noted 
blood products within the right lateral ventricle which layer posteriorly. 
  
The orbital structures demonstrate findings of bilateral lens replacements. The 
calvarial and maxillofacial soft tissues are without evidence of acute 
abnormality. 
  
Neck: The thyroid gland is similar to prior. No evidence of significant cervical or 
upper thoracic adenopathy. The lungs are without areas of prominent focal 
consolidation. 
  
OSSEOUS STRUCTURES: 
The mastoid air cells are well aerated. The paranasal sinuses are without 
significant mucoperiosteal thickening. There are multilevel degenerative changes 
of the cervical spine. There is a similar sclerotic focus in the T2 vertebral 
body posteriorly likely representing a bone island. 
  
IMPRESSION IMPRESSION: 
1. No evidence of significant stenosis, proximal vessel occlusion, or aneurysms 
in the major intracranial arterial vasculature. 
  
2. Atherosclerotic disease of the bilateral proximal internal carotid arteries 
with approximately 50% stenosis in these regions. Moderate stenosis of the right 
vertebral artery origin. 
  
3. Redemonstration of the findings of large volume intraparenchymal 
hemorrhage/hematoma in the right frontal lobe as well as small volume 
hemorrhage/hematoma in the right temporal lobe as well as intraventricular 
hemorrhage in the right lateral ventricle, as above. Neoplastic lesions cannot 
be excluded underlying the areas of parenchymal hemorrhage. CT HEAD WITHOUT CONTRAST  
  
HISTORY:  CVA. 
  
COMPARISON: None 
  
TECHNIQUE: Axial imaging was performed without intravenous contrast utilizing 5mm slice thickness. Sagittal and coronal reformats were performed. Radiation dose reduction techniques were used for this study. Our CT scanner uses one or 
all of the following: 
  
Automated exposure control, adjustment of the MAS or KUB according to patient's 
size and iterative reconstruction. 
  
FINDINGS:     
  
*BRAIN:  
   -  Right frontal intraparenchymal hematoma measuring 6.0 x 4.9 cm. Right-sided intraventricular hemorrhage. 
   -  Right temporal hyperdense mass measuring 1.1 x 1.0 cm. Vasogenic edema in 
left temporal lobe. -  No gross white matter abnormality by CT. 
  
*VISUALIZED PARANASAL SINUSES: Well aerated. *MASTOIDS:  Clear. *CALVARIUM AND SCALP: Unremarkable. 
  
  
IMPRESSION IMPRESSION: 
  
Right frontal hematoma. 
  
Right lateral intraventricular hemorrhage. 
  
Given the other findings I suspect hemorrhagic metastatic disease. Labs: Results:  
   
Chemistry Recent Labs 03/02/20 
0599 03/02/20 
9976 * 146*  145  
K 3.3* 3.2*  
* 107 CO2 29 34* BUN 15 16 CREA 0.81 0.82 CA 8.8 8.4 AGAP 6* 4* AP  --  185* TP  --  7.1 ALB  --  3.5 GLOB  --  3.6* AGRAT  --  1.0*  
  
CBC w/Diff Recent Labs 03/02/20 
8879 03/02/20 
1667 WBC 15.1* 12.3*  
RBC 4.82 4.59 HGB 13.6 12.8* HCT 43.2 40.7*  187 GRANS 93* 90* LYMPH 3* 6*  
EOS 0* 0* Cardiac Enzymes No results for input(s): CPK, CKND1, ALFREDA in the last 72 hours. No lab exists for component: Gabbie Martin Coagulation Recent Labs 03/02/20 
2489 03/02/20 
0147 PTP 13.7  --   
INR 1.0 1.0 Lipid Panel Lab Results Component Value Date/Time Cholesterol, total 152 09/27/2019 04:57 AM  
 HDL Cholesterol 44 09/27/2019 04:57 AM  
 LDL, calculated 91.2 09/27/2019 04:57 AM  
 VLDL, calculated 16.8 09/27/2019 04:57 AM  
 Triglyceride 84 09/27/2019 04:57 AM  
 CHOL/HDL Ratio 3.5 09/27/2019 04:57 AM  
  
BNP No results for input(s): BNPP in the last 72 hours. Liver Enzymes Recent Labs 03/02/20 
8110 TP 7.1 ALB 3.5 * SGOT 27 Thyroid Studies Lab Results Component Value Date/Time TSH 0.413 09/27/2019 04:57 AM  
    
 
 
Discharge Exam: 
Visit Mindy Guevara BP (!) 218/96 Pulse 92 Temp 98 °F (36.7 °C) Resp 23 Ht 5' 9\" (1.753 m) Wt 81.6 kg (180 lb) SpO2 98% BMI 26.58 kg/m² General appearance: elderly, NAD, nonverbal, stuporous. Lungs: clear to auscultation bilaterally Heart: regular rate and rhythm, S1, S2 normal, no murmur, click, rub or gallop Abdomen: soft, non-tender. Bowel sounds normal. No masses,  no organomegaly Extremities: no cyanosis or edema Neurologic: nonverbal, barely withdrawing to pain, right pupil sluggish, motor or sensory deficits could not be assessed Disposition: HOSPICE HOUSE Discharge Condition: stable Patient Instructions:  
Current Discharge Medication List  
  
STOP taking these medications  
  
 lisinopril (PRINIVIL, ZESTRIL) 20 mg tablet Comments:  
Reason for Stopping:   
   
 atorvastatin (LIPITOR) 40 mg tablet Comments:  
Reason for Stopping:   
   
 phenytoin ER (DILANTIN ER) 100 mg ER capsule Comments:  
Reason for Stopping: NIFEdipine ER (ADALAT CC) 60 mg ER tablet Comments:  
Reason for Stopping: PARoxetine CR (PAXIL CR) 37.5 mg tablet Comments:  
Reason for Stopping:  LORazepam (ATIVAN) 0.5 mg tablet Comments:  
Reason for Stopping:   
   
 diphenhydrAMINE-acetaminophen (TYLENOL PM EXTRA STRENGTH)  mg tab Comments:  
Reason for Stopping:   
   
 magnesium oxide (MAG-OX) 400 mg tablet Comments:  
Reason for Stopping:   
   
 aspirin 81 mg chewable tablet Comments:  
Reason for Stopping:   
   
 potassium chloride SR (KLOR-CON 10) 10 mEq tablet Comments:  
Reason for Stopping:   
   
 esomeprazole (NEXIUM) 40 mg capsule Comments:  
Reason for Stopping:   
   
 donepezil (ARICEPT) 10 mg tablet Comments:  
Reason for Stopping:   
   
 multivitamin (ONE A DAY) tablet Comments:  
Reason for Stopping:   
   
  
 
 
Activity: Bedrest 
 Diet: Comfort feeding Wound Care: None needed Follow-up ·   FOLLOW UP WITH HOSPICE TEAM AT [facility] Time spent to discharge patient 35 minutes Signed: 
Saleem Mendoza MD 
3/2/2020 
4:54 PM

## 2020-03-02 NOTE — PROGRESS NOTES
Initial visit made to patient and a prayer was provided for the patient and his wife, Lesa Benito card was left. Diamond was crying and distraught about her 's prognosis.  provided active listening, empathy and prayer. Joanne Jo MDIV

## 2020-03-02 NOTE — PROGRESS NOTES
A follow up visit was made to the patient and his wife, Diamond. Emotional support, spiritual presence and the assurance of   
prayer were provided for the patient. His wife had questions about hospice and she was still dealing with being overwhelmed by the seriousness of her 's medical condition. Hansel Mayo MDIV

## 2020-03-02 NOTE — HOSPICE
[facility] is able to admit patient tonight around [address] [facility] with Mrs. Coleman. She was in favor of having patient transferred to [id number] [facility]. [address] notified. Nurse Emely Nunez and ISABELA Hamlin updated with new plan. Kandis Vega RN BSN [facility] [phone]

## 2020-03-02 NOTE — H&P
HOSPITALIST INITIAL HISTORY AND PHYSICAL 
 
NAME:  Wilmer Miner Age:  66 y.o. 
:   1945 MRN:   [id number] PCP: Gwyn Rehman MD 
Consulting MD: Treatment Team: Attending Provider: Maliha Mendoza MD; Primary Nurse: Johnnie Nathan RN 
 
CHIEF COMPLAINT: inability to follow commands HISTORY OF PRESENT ILLNESS:  
Wilmer Miner is a 66 y.o. male with a past medical history of CAD, vascular dementia, seizure disorder, previous CVA, previous ICH in 2019 who presents to the ER with report of RUL tremor, R gaze deviation, and inability to follow commands. Currently, the patient is nonverbal and unable to follow commands. REVIEW OF SYSTEMS: Comprehensive ROS unable to be performed given nonverbal status Past Medical History:  
Diagnosis Date  Anemia  Arthritis  Autoimmune disease ([facility]  CAD (coronary artery disease)  Cancer [facility]) 2009  
 prostate  Hypertension  Seizures ([facility]  Stroke ([facility] Past Surgical History:  
Procedure Laterality Date  CARDIAC SURG PROCEDURE UNLIST    
 one stent  HX APPENDECTOMY  HX COLONOSCOPY  2018  
 colon polyps  HX ORTHOPAEDIC    
 knee surgeries  HX PROSTATECTOMY  2009 Prior to Admission Medications Prescriptions Last Dose Informant Patient Reported? Taking? LORazepam (ATIVAN) 0.5 mg tablet   No No  
Sig: Take 1 Tab by mouth every eight (8) hours as needed (Panic attack). Max Daily Amount: 1.5 mg. Indications: anxious NIFEdipine ER (ADALAT CC) 60 mg ER tablet   No No  
Sig: Take 1 Tab by mouth two (2) times a day. PARoxetine CR (PAXIL CR) 37.5 mg tablet   Yes No  
Sig: Take 37.5 mg by mouth daily. aspirin 81 mg chewable tablet   No No  
Sig: Take 1 Tab by mouth daily. atorvastatin (LIPITOR) 40 mg tablet   No No  
Sig: TAKE 1 TABLET BY MOUTH NIGHTLY  
diphenhydrAMINE-acetaminophen (TYLENOL PM EXTRA STRENGTH)  mg tab   Yes No  
Sig: Take 2 Tabs by mouth daily as needed for Pain. donepezil (ARICEPT) 10 mg tablet   Yes No  
Sig: Take 10 mg by mouth daily. esomeprazole (NEXIUM) 40 mg capsule   Yes No  
Sig: Take 40 mg by mouth as needed for Other (heartburn). lisinopril (PRINIVIL, ZESTRIL) 20 mg tablet   No No  
Sig: Take 1 Tab by mouth two (2) times a day. magnesium oxide (MAG-OX) 400 mg tablet   No No  
Sig: Take 1 Tab by mouth daily. multivitamin (ONE A DAY) tablet   Yes No  
Sig: Take 1 Tab by mouth daily. phenytoin ER (DILANTIN ER) 100 mg ER capsule   No No  
Sig: Take 2 Caps by mouth two (2) times a day. potassium chloride SR (KLOR-CON 10) 10 mEq tablet   No No  
Sig: Take 1 Tab by mouth daily. Patient taking differently: Take 1 Tab by mouth daily. Hold potassium until BMP drawn and results reviewed by Dr. Farooq Kearns Facility-Administered Medications: None No Known Allergies FAMILY HISTORY: Reviewed. Negative except Family History Problem Relation Age of Onset  Cancer Mother  Hypertension Father  Heart Disease Father  Kidney Disease Father  Hypertension Sister Social History Tobacco Use  Smoking status: Former Smoker Last attempt to quit: 1965 Years since quittin.2  Smokeless tobacco: Never Used Substance Use Topics  Alcohol use: Not Currently Objective:  
 
Visit Vitals /65 Pulse 88 Temp 98.7 °F (37.1 °C) Resp 19 SpO2 100% Temp (24hrs), Av.7 °F (37.1 °C), Min:98.7 °F (37.1 °C), Max:98.7 °F (37.1 °C) Oxygen Therapy O2 Sat (%): 100 % (20) Pulse via Oximetry: 89 beats per minute (20) O2 Device: Nasal cannula (20) O2 Flow Rate (L/min): 3 l/min (20) Physical Exam: 
General:    The patient is an elderly male in no acute distress. Head:   Normocephalic/atraumatic. Eyes:  No palpebral pallor or scleral icterus. ENT:  External auricular and nasal exam within normal limits.   
  Mucous membranes are moist. 
 Neck:  Supple, non-tender, no JVD. Lungs:   Clear to auscultation bilaterally without wheezes or crackles. No respiratory distress or accessory muscle use. Heart:   Regular rate and rhythm, without murmurs, rubs, or gallops. Abdomen:   Soft, non-tender, non-distended with normoactive bowel sounds. Genitourinary: No tenderness over the bladder or bilateral CVAs. Extremities: Without clubbing, cyanosis, or edema. Skin:     Normal color, texture, and turgor. No rashes, lesions, or jaundice. Pulses: Radial and dorsalis pedis pulses present 2+ bilaterally. Capillary refill <2s. Neurologic: RUE tremor, moving spontaneously. No spontaneous movement of LUE. Spontaneous movements of BLE. Nonverbal 
Psychiatric: Nonverbal 
 
Data Review:  
Recent Results (from the past 24 hour(s)) POC PT/INR Collection Time: 03/02/20  1:47 AM  
Result Value Ref Range Prothrombin time (POC) 12.1 (H) 9.6 - 11.6 SECS  
 INR (POC) 1.0 0.9 - 1.2 GLUCOSE, POC Collection Time: 03/02/20  1:48 AM  
Result Value Ref Range Glucose (POC) 125 (H) 65 - 100 mg/dL CBC WITH AUTOMATED DIFF Collection Time: 03/02/20  2:37 AM  
Result Value Ref Range WBC 12.3 (H) 4.3 - 11.1 K/uL  
 RBC 4.59 4.23 - 5.6 M/uL  
 HGB 12.8 (L) 13.6 - 17.2 g/dL HCT 40.7 (L) 41.1 - 50.3 % MCV 88.7 79.6 - 97.8 FL  
 MCH 27.9 26.1 - 32.9 PG  
 MCHC 31.4 31.4 - 35.0 g/dL  
 RDW 13.3 11.9 - 14.6 % PLATELET 650 901 - 801 K/uL MPV 9.3 (L) 9.4 - 12.3 FL ABSOLUTE NRBC 0.00 0.0 - 0.2 K/uL  
 DF AUTOMATED NEUTROPHILS 90 (H) 43 - 78 % LYMPHOCYTES 6 (L) 13 - 44 % MONOCYTES 3 (L) 4.0 - 12.0 % EOSINOPHILS 0 (L) 0.5 - 7.8 % BASOPHILS 0 0.0 - 2.0 % IMMATURE GRANULOCYTES 0 0.0 - 5.0 %  
 ABS. NEUTROPHILS 11.0 (H) 1.7 - 8.2 K/UL  
 ABS. LYMPHOCYTES 0.7 0.5 - 4.6 K/UL  
 ABS. MONOCYTES 0.4 0.1 - 1.3 K/UL  
 ABS. EOSINOPHILS 0.0 0.0 - 0.8 K/UL  
 ABS. BASOPHILS 0.0 0.0 - 0.2 K/UL  
 ABS. IMM. GRANS. 0.1 0.0 - 0.5 K/UL METABOLIC PANEL, COMPREHENSIVE Collection Time: 03/02/20  2:37 AM  
Result Value Ref Range Sodium 145 136 - 145 mmol/L Potassium 3.2 (L) 3.5 - 5.1 mmol/L Chloride 107 98 - 107 mmol/L  
 CO2 34 (H) 21 - 32 mmol/L Anion gap 4 (L) 7 - 16 mmol/L Glucose 146 (H) 65 - 100 mg/dL BUN 16 8 - 23 MG/DL Creatinine 0.82 0.8 - 1.5 MG/DL  
 GFR est AA >60 >60 ml/min/1.73m2 GFR est non-AA >60 >60 ml/min/1.73m2 Calcium 8.4 8.3 - 10.4 MG/DL Bilirubin, total 0.3 0.2 - 1.1 MG/DL  
 ALT (SGPT) 27 12 - 65 U/L  
 AST (SGOT) 27 15 - 37 U/L Alk. phosphatase 185 (H) 50 - 136 U/L Protein, total 7.1 6.3 - 8.2 g/dL Albumin 3.5 3.2 - 4.6 g/dL Globulin 3.6 (H) 2.3 - 3.5 g/dL A-G Ratio 1.0 (L) 1.2 - 3.5 PROTHROMBIN TIME + INR Collection Time: 03/02/20  2:37 AM  
Result Value Ref Range Prothrombin time 13.7 12.0 - 14.7 sec INR 1.0    
TROPONIN I Collection Time: 03/02/20  2:37 AM  
Result Value Ref Range Troponin-I, Qt. <0.02 (L) 0.02 - 0.05 NG/ML  
PHENYTOIN Collection Time: 03/02/20  2:37 AM  
Result Value Ref Range Phenytoin 4.2 (L) 10 - 20 ug/mL Imaging /Procedures /Studies: 
Ct Code Neuro Head Wo Contrast 
 
Result Date: 3/2/2020 IMPRESSION: Right frontal hematoma. Right lateral intraventricular hemorrhage. Given the other findings I suspect hemorrhagic metastatic disease. Date of Dictation: 3/2/2020 2:15 AM  
  
 
 
Assessment and Plan:  
 
Principal Problem: 
  Intracranial hemorrhage ([address] (3/2/2020) Admit to ICU. NPO. IV Cardene gtt, titrate to SBP < 140. Elevate head of bead. Neurosurgery aware. Formally consult neurology and neurosurgery. Active Problems: 
  Seizure disorder ([address] (10/1/2019) Stable. Continue home meds. HTN (hypertension) (9/26/2019) Per above. CAD (coronary artery disease) (9/26/2019) Stable. Continue home meds. History of CVA (cerebrovascular accident) (9/26/2019) Per above. HLD (hyperlipidemia) (9/26/2019) Stable. Continue home meds. Vascular dementia with behavior disturbance ([facility] (12/3/2019) Stable. Continue home meds. DVT Prophylaxis: SCDs Code Status: DNR Disposition: Admit to ICU for evaluation and treatment as per above. Anticipated discharge: 2-3 days Signed By: Soren Cross MD   
 March 2, 2020

## 2020-03-02 NOTE — PROGRESS NOTES
TRANSFER - OUT REPORT: 
 
Verbal report given to [facility] RN(name) on Arleen Melvin  being transferred to hospice house(unit) for hospice. Report consisted of patients Situation, Background, Assessment and  
Recommendations(SBAR). Information from the following report(s) SBAR, Kardex, Intake/Output, MAR and Recent Results was reviewed with the receiving nurse. Lines:  
Peripheral IV 03/02/20 Left Antecubital (Active) Site Assessment Clean, dry, & intact 3/2/2020  7:01 AM  
Phlebitis Assessment 0 3/2/2020  7:01 AM  
Infiltration Assessment 0 3/2/2020  7:01 AM  
Dressing Status Clean, dry, & intact 3/2/2020  7:01 AM  
Dressing Type Transparent;Tape 3/2/2020  7:01 AM  
Hub Color/Line Status Green;Patent; Infusing 3/2/2020  7:01 AM  
Alcohol Cap Used Yes 3/2/2020  5:01 AM  
   
Peripheral IV 03/02/20 Right Antecubital (Active) Site Assessment Clean, dry, & intact 3/2/2020  7:01 AM  
Phlebitis Assessment 0 3/2/2020  7:01 AM  
Infiltration Assessment 0 3/2/2020  7:01 AM  
Dressing Status Clean, dry, & intact 3/2/2020  7:01 AM  
Dressing Type Transparent;Tape 3/2/2020  7:01 AM  
Hub Color/Line Status Green;Patent 3/2/2020  7:01 AM  
Alcohol Cap Used No 3/2/2020  5:01 AM  
  
 
Opportunity for questions and clarification was provided.    
 
Patient transported with: 
 [facility]

## 2020-03-03 NOTE — H&P
History and Physical    Patient: Padma Delvalle MRN: [id number]  SSN: [id number]    YOB: 1945  Age: 66 y.o. Sex: male      Subjective:      Padma Delvalle is a 66 y.o. male who has a hospice diagnosis of sequelae of right sided hemorrhagic stroke. Hospice associated diagnoses are simultaneous right frontal and right temporal parenchymal hemorrhage and stroke, cerebral right intraventricular hemorrhage, vascular dementia with behavioral disturbances, seizure disorder, receptive and expressive aphasia, refractory nausea & vomiting, remote right frontal encephalomalacia and history of prior stroke. Non associated diagnoses are ASHD s/p PCTA, prostate cancer survivor and osteoarthritis. On 2/29/20, he was admitted to the hospital due to coarse tremor of the upper extremities and altered mental state. Radiology studies revealed right frontal lobe mesenteric hemorrhagic stroke with extension of the hemorrhage into the right lateral ventricle. NG tube was inserted for administration of his home meds. He is unable to swallow. Unfortunately, he began to vomit due to increased intracranial pressure. He is now unable to keep down his medications including anti-seizure medications. Due to his recent decline, his family has elected to forgo further medical treatment and pursue comfort measures with hospice care. Without further treatment, his life expectancy is less than 5 days. Patient admitted GIP with sequelae of stroke for management of pain, dyspnea, agitation and seizures.      Past Medical History:   Diagnosis Date    Anemia     Arthritis     [facility] disease [facility]     CAD (coronary artery disease)     Cancer ([facility] 2009    prostate     Hypertension     Seizures ([facility]     Stroke [facility])      Past Surgical History:   Procedure Laterality Date    CARDIAC SURG PROCEDURE UNLIST      one stent    HX APPENDECTOMY      HX COLONOSCOPY  2018    colon polyps    HX ORTHOPAEDIC      knee surgeries    HX PROSTATECTOMY  2009      Family History   Problem Relation Age of Onset   [de-identified] Cancer Mother     Hypertension Father     Heart Disease Father     Kidney Disease Father     Hypertension Sister      Social History     Tobacco Use    Smoking status: Former Smoker     Last attempt to quit: 1965     Years since quittin.3    Smokeless tobacco: Never Used   Substance Use Topics    Alcohol use: Not Currently      Prior to Admission medications    Medication Sig Start Date End Date Taking? Authorizing Provider   lisinopril (PRINIVIL, ZESTRIL) 20 mg tablet Take 1 Tab by mouth two (2) times a day. 20   Adilene Garibay MD   atorvastatin (LIPITOR) 40 mg tablet TAKE 1 TABLET BY MOUTH NIGHTLY 20   Magen Andres MD   phenytoin ER (DILANTIN ER) 100 mg ER capsule Take 2 Caps by mouth two (2) times a day. 20   Tisha Harris MD   NIFEdipine ER (ADALAT CC) 60 mg ER tablet Take 1 Tab by mouth two (2) times a day. 20   Adilene Garibay MD   PARoxetine CR (PAXIL CR) 37.5 mg tablet Take 37.5 mg by mouth daily. Provider, Historical   LORazepam (ATIVAN) 0.5 mg tablet Take 1 Tab by mouth every eight (8) hours as needed (Panic attack). Max Daily Amount: 1.5 mg. Indications: anxious 12/3/19   Terence Christie MD   diphenhydrAMINE-acetaminophen (TYLENOL PM EXTRA STRENGTH)  mg tab Take 2 Tabs by mouth daily as needed for Pain. Provider, Historical   magnesium oxide (MAG-OX) 400 mg tablet Take 1 Tab by mouth daily. 10/16/19   Magen Andres MD   aspirin 81 mg chewable tablet Take 1 Tab by mouth daily. 10/16/19   Magen Andres MD   potassium chloride SR (KLOR-CON 10) 10 mEq tablet Take 1 Tab by mouth daily. Patient taking differently: Take 1 Tab by mouth daily. Hold potassium until BMP drawn and results reviewed by Dr. Precious Connor 10/15/19   Magen Andres MD   esomeprazole (NEXIUM) 40 mg capsule Take 40 mg by mouth as needed for Other (heartburn).     Provider, Historical   donepezil (ARICEPT) 10 mg tablet Take 10 mg by mouth daily. Provider, Historical   multivitamin (ONE A DAY) tablet Take 1 Tab by mouth daily. Provider, Historical        No Known Allergies    Review of Systems:  Review of systems not obtained due to patient factors. Objective:     Vitals:    03/02/20 2252 03/03/20 0414 03/03/20 1312   BP:  (!) 206/81 (!) 224/106   Pulse: 84 82 (!) 123   Resp: 22 26 (!) 36   Temp: 99.8 °F (37.7 °C) 98.8 °F (37.1 °C) 98.8 °F (37.1 °C)        Physical Exam:  GENERAL: moderate distress, appears stated age, pale, unresponsive  LUNG: Coarse breath sounds with labored respirations. HEART: regular rate and rhythm  ABDOMEN: soft, non-tender. Bowel sounds hypoactive. : Ramirez catheter with dark yellow urine. EXTREMITIES:  extremities normal, atraumatic, no cyanosis or edema  SKIN: Warm to touch. Skin intact. NEUROLOGIC: Unresponsive. Unable to participate in exam. Bedbound.      Assessment:     Hospital Problems  Date Reviewed: 3/2/2020          Codes Class Noted POA    * (Principal) Sequelae, post-stroke (Chronic) ICD-10-CM: I69.30  ICD-9-CM: 438.9  3/2/2020 Unknown              Plan:     Current Facility-Administered Medications   Medication Dose Route Frequency    LORazepam (ATIVAN) injection 1 mg  1 mg IntraVENous Q8H    dexamethasone (DECADRON) 4 mg/mL injection 4 mg  4 mg IntraVENous Q6H    haloperidol lactate (HALDOL) injection 4 mg  4 mg IntraVENous Q8H    sodium chloride (NS) flush 3 mL  3 mL IntraVENous Q12H    sodium chloride (NS) flush 3 mL  3 mL IntraVENous PRN    HYDROmorphone (DILAUDID) syringe 0.5 mg  0.5 mg SubCUTAneous Q30MIN PRN    Or    HYDROmorphone (DILAUDID) syringe 0.5 mg  0.5 mg IntraVENous Q30MIN PRN    acetaminophen (TYLENOL) suppository 650 mg  650 mg Rectal Q3H PRN    bisacodyL (DULCOLAX) suppository 10 mg  10 mg Rectal PRN    haloperidol lactate (HALDOL) injection 2 mg  2 mg SubCUTAneous Q1H PRN    Or    haloperidol lactate (HALDOL) injection 2 mg  2 mg IntraVENous Q1H PRN    glycopyrrolate (ROBINUL) injection 0.2 mg  0.2 mg IntraVENous Q4H PRN    haloperidol lactate (HALDOL) injection 2 mg  2 mg SubCUTAneous Q1H PRN    Or    haloperidol lactate (HALDOL) injection 2 mg  2 mg IntraVENous Q1H PRN    LORazepam (ATIVAN) injection 1 mg  1 mg IntraVENous Q4H PRN    LORazepam (ATIVAN) injection 2 mg  2 mg IntraVENous Q10MIN PRN    PHENobarbital (LUMINAL) injection 65 mg  65 mg SubCUTAneous Q12H       3/2: ([place]ison) Admitted GIP with sequelae of stroke for management of pain, dyspnea, agitation and seizures. 1. Pain: Hydromorphone 0.5mg IV/SQ Q30 minutes as needed. 2. Dyspnea: Hydromorphone 0.5mg IV/SQ Q30 minutes as needed. Glycopyrrolate 0.2mg q4 prn secretions. Oxygen prn.    3. Agitation: Haloperidol 2mg IV/SQ Q1 hour prn and Lorazepam 1mg IV/IM q4 hours prn.    4. Nausea: Haloperidol 4mg q8 and Ativan 1mg q8. Decadron q6.     5. Seizures: Phenobarbital 65mg q12. Lorazepam 1mg q8 scheduled and prn sustained seizure activity and notify provider. 6. Family/Pt Support: Family at bedside during exam. Medications and plan of care discussed with nursing staff and family. Will continue to monitor for symptoms and adjust medications as needed to maintain patient comfort. PPS 10%. Case discussed with Dr. Juan Alberto Lawson.     Signed By: Alvin Loja NP     March 3, 2020

## 2020-03-03 NOTE — PROGRESS NOTES
Problem: Falls - Risk of  Goal: *Absence of Falls  Description  Document Cathy Daniel Fall Risk and appropriate interventions in the flowsheet. Outcome: Progressing Towards Goal  Note: Fall Risk Interventions:       Mentation Interventions: Adequate sleep, hydration, pain control, Bed/chair exit alarm, Door open when patient unattended, Room close to nurse's station, Toileting rounds, Evaluate medications/consider consulting pharmacy    Medication Interventions: Bed/chair exit alarm, Evaluate medications/consider consulting pharmacy    Elimination Interventions: Bed/chair exit alarm, Call light in reach, Toileting schedule/hourly rounds              Problem: Pressure Injury - Risk of  Goal: *Prevention of pressure injury  Description  Document Jerry Scale and appropriate interventions in the flowsheet. Outcome: Progressing Towards Goal  Note: Pressure Injury Interventions:  Sensory Interventions: Assess changes in LOC, Float heels, Assess need for specialty bed, Check visual cues for pain    Moisture Interventions: Absorbent underpads, Maintain skin hydration (lotion/cream), Apply protective barrier, creams and emollients, Moisture barrier, Limit adult briefs, Assess need for specialty bed    Activity Interventions: Assess need for specialty bed    Mobility Interventions: Assess need for specialty bed, Float heels, HOB 30 degrees or less                          Problem: Hospice Orientation  Goal: Demonstrate understanding of hospice philosophy, plan of care, and home hospice program  Description  The patient/family/caregiver will demonstrate understanding of hospice philosophy, plan of care and the home hospice program as evidenced by participation in meeting the patient's psychosocial, spiritual, medical, and physical needs inclusive of medical supplies/equipment focusing on symptoms.   Outcome: Progressing Towards Goal     Problem: Anxiety/Agitation  Goal: Verbalize and demonstrate ability to manage anxiety  Description  The patient/family/caregiver will verbalize and demonstrate ability to manage the patient's anxiety throughout hospice care. Outcome: Progressing Towards Goal     Problem: Pain  Goal: *Control of Pain  Description  Catie Late will have pain control AEB a FLACC score of 2 or less.   Outcome: Progressing Towards Goal

## 2020-03-03 NOTE — PROGRESS NOTES
Background:  Yocasta Brumfield is a 66year old gentleman who comes to us after an extended illness. He is  to Dieter Shetty.  It is a second marriage for them both. April Cedeno is Diamond's son which Isaiah Thomas adopted when he was 3years old. Isaiah Thomas had two adult children at that time, Janice Nieves ( wife Clary Greenberg) and [facility]. When the two met Louie Joya was a supervisor in the [employer]. Diamond was a CPA and worked in administration. They lived in [place] for a number of years. Soon after moving to the [facility], Isaiah Thomas became ill. They both are Christians and were very involved in the [employer] ( [address] [place] [address]) in [place], [place]. Due to Josh's decline since moving to [place], they have not connected with a [employer]. Assessment:  Patient is lying quietly in bed with what appears to be some labored breathing. Marino Mayorga RN had just medicated him and was leaving the room when  arrived. Patient's wife, Dieter Shetty  Was sitting at bedside. Their son April Cedeno was seated nearby on the  seat. Diamond shared their journey, going back to when they first met and what a good life they have had together. She spoke of Josh's sense of humor and compulsion to tell jokes. She said he had been a wonderful caregiver as they had cared for parents together. Diamond spoke of their Sabrina and it's importance to them. She also spoke of how her Sabrina had been challenged through Josh's illness.  attempted to normalize feelings of doubt, frustration and anger which are common when faced with loosing a loved one. Dieter Shetty believes in [place] and verbalized how Rodger Morin had brought her through the last couple of years. She is tearful as she speaks of her , specifically when she talks of the good times they have had together. Isaiah Thomas gave her the gift of preparing a DNR and letting his wishes be known long before he was ill. She does not question following his wishes. April Cedeno is very supportive.   Clement Needs are dealing with Anticipatory Grief appropriately. Kanwal Loveless is anxious about his mom and wants to make sure she is supported. During this encounter  provided support through active listening, compassion, words of comfort and prayer. Plan:  to provide spiritual and bereavement support including spiritual rituals. Focus on Anticipatory Grief.

## 2020-03-03 NOTE — PROGRESS NOTES
2215:  Patient is a 75-year-old male [facility] [place] [facility]. Patient is admitted GIP level of care with admitting diagnosis of sequelae of hemorrhagic stroke with agitation and pain symptoms to be managed. Patient was admitted to the hospital with reports of RUL tremor, R gaze deviation, and inability to follow commands. ER work-up showed large right frontal intraparenchymal hemorrhage. Patient has a history of stroke, vascular dementia with behavioral disturbances, seizure disorder, refractory nausea and vomiting, and is a prostate cancer survivor. On admission, pt noted to be non-verbal.  Eyes open but does not track or respond to any stimuli. Pt with NG tube and hooked up to low intermittent suction per orders. Patient has a chambers catheter and two peripheral IV's that are patent. Patient's skin is intact. On admission, patient bathed by nurse and CNA. Pt noted to be restless. Lorazepam 1 mg IV given for agitation/anxiety per orders. Pt showed no other signs of distress on admission. No s/sx of pain observed. Patient's son arrived @ 18. Wife to come in the morning. Oriented son to room and facility and questions answered. Pt's son is very emotional with many questions. Answered all questions and informed son we are always here to answer any questions/concerns family may have. Son also states pt's wife is very emotional and tearful. States they welcome angelina support. Admission complete and initial general hospice care plan initiated which includes spiritual, psychosocial, and bereavement. Immediate needs recognized for symptom management. IDG team, including MD, made aware of plan of care and immediate needs. Family is aware of volunteer services. 2245:  Reassessment. Pt resting with no s/sx of agitation/anxiety observed. 0100:  Pt resting with no s/sx of pain, dyspnea, agitation, or seizures noted. FLACC 0.    0315:  Pt with increased RR.   Discussed with pt's son that pt's diagnosis of CVA will cause breathing pattern to be irregular and son voiced understanding. PRN Hydromorphone 0.5 mg IV given at this time for dyspnea. Pt repositioned at this time for comfort.    0326:  Pt noted to be restless; moving right arm up and down. Medicated with PRN Lorazepam 1 mg IV for agitation/anxiety. Will reassess. 2199:  Reassessment. Pt with less labored breathing noted but still breathing rapidly. Pt with no s/sx of agitation/anxiety observed. Son remains at bedside. 0600:  Pt continues to rest with no s/sx of pain, dyspnea, agitation, or seizures noted. FLACC 0. Pt has required one dose of PRN Hydromorphone this shift for dyspnea and two doses of PRN Lorazepam this shift for agitation/anxiety with good effect. NG tube continues to low intermittent suction with 25 mL recorded output since admission at 2215. Pt's son has remained at the bedside. Report to Neena Gorman RN.

## 2020-03-03 NOTE — PROGRESS NOTES
The patient report and walking rounds completed at 0640 with Dearcrystal Avina RN. The patient was identified by name and . The patient is showing no signs of distress at this time. No signs of anxiety, SOB, nausea/vomit or pain. The bed is low and locked with two side rails up and the call light within his son's reach. The son is sleeping on the cot. 0830- The patient continues to sleep. Nasogastric tube is hooked to suction. There is 50 mls in the jar. The chambers is draining clear yellow urine. 1025- The patient is vomiting. He was medicated with Haldol 2 mg by Amy Smith. 1043- Medicated with Ativan 2 mg IV for nausea. The NG tube continues to drain. Educated the family on the medication administered and why. The family voiced understanding. 1115- The patient is now resting in the bed with signs of vomit observed. 1330- The patient is very restless. Medicated with scheduled Lorazepam 1 mg IV and Hydromorphone 0.5 mg for dyspnea. Educated the family on the medications administered. The family voiced understanding. 1400- The patient is now resting quietly in the bed with no signs of distress observed. 1440- Scheduled Haldol and Decadron administered IV as ordered. Educated the patient's wife on the medication. She voiced understanding. 1700- The patient is very diaphoretic. Bed clothes and gown changed by the CNA and patient washed off. Educated the patient's son that the patient may have had an event that caused the excessive sweating. He voiced understanding. 1804- Medicated with Haldol 2 mg IV for agitation and Hydromorphone 0.5 mg IV for dyspnea. 1831- Medicated with Hydromorphone 0.5 mg for tachypnea and Lorazepam 1 mg for anxiety. The patient is very restless. 1845- The patient is now resting quietly in the bed with no signs of distress observed. Reported off to [address] and completed walking rounds.

## 2020-03-03 NOTE — HSPC IDG NURSE NOTES
Patient: Angelique Thomas    Date: 03/03/20  Time: 12:13 AM    [facility] Nurse Notes    Patient is a 68-year-old male admitted from [facility] to [facility]. Patient is admitted GIP level of care with admitting diagnosis of sequelae of hemorrhagic stroke with agitation and pain symptoms to be managed. Patient was admitted to the hospital with reports of RUL tremor, R gaze deviation, and inability to follow commands. ER work-up showed large right frontal intraparenchymal hemorrhage. Patient has a history of stroke, vascular dementia with behavioral disturbances, seizure disorder, refractory nausea and vomiting, and is a prostate cancer survivor. On admission, pt noted to be non-verbal.  Eyes open but does not track or respond to any stimuli. Pt with NG tube and hooked up to low intermittent suction per orders. Patient has a chambers catheter and two peripheral IV's that are patent. Patient's skin is intact. On admission, patient bathed by nurse and CNA. Pt noted to be restless. Lorazepam 1 mg IV given for agitation/anxiety per orders. Pt showed no other signs of distress on admission. No s/sx of pain observed. Patient's son arrived @ 18. Wife to come in the morning. Oriented son to room and facility and questions answered. Pt's son is very emotional with many questions. Answered all questions and informed son we are always here to answer any questions/concerns family may have. Son also states pt's wife is very emotional and tearful. States they welcome angelina support. Admission complete and initial general hospice care plan initiated which includes spiritual, psychosocial, and bereavement. Immediate needs recognized for symptom management. IDG team, including MD, made aware of plan of care and immediate needs. Family is aware of volunteer services.         Signed by: Katt Mitchell RN

## 2020-03-04 NOTE — PROGRESS NOTES
Shift change report taken with Vickie Ashraf RN and walking rounds completed. The patient was identified by name and . Patient is resting quietly in the bed with no signs of pain, SOB, nausea / dyspnea or agitation / anxiety. The bed is low and locked with two bed rails up and tab alert in place. The door to the patient's room is closed. pts son is  Present. 2200 Pt assessment complete. The patient is resting quietly in the bed with no signs of distress. Respirations are even and unlabored. pts son left and wife and wife's brother are present. Door is closed. 2330 Pt observed and appears to be sleeping/resting, no facial grimacing, no moaning, relaxed respirations. No symptoms to manage at this time. Flacc 0/10. Pt has 3 family members at bedside. 0045 3 family members left and pts son returned to stay for the night. Pt resting quietly with no s&s of distress. 3600 [facility] scheduled IV meds. Pt observed and appears to be sleeping/resting, no facial grimacing, no moaning, relaxed respirations. No symptoms to manage at this time. Flacc 0/10. Pts son asleep at bedside. 0330 pt resting in bed with eyes closed. No s&s of distress. Door open. Tab alert on and hooked to pts gown. flacc= 0   Pts son at bedside asleep. Door is closed. 0400 pts respirations are 40 per min. Gave dilaudid 0.5mg and lorazepam 1 mg IV.    0430 pt resting in bed with eyes closed. No s&s of distress. Respirations = 22. Door closed. Tab alert on and hooked to pts gown. flacc= 0  Pts son asleep at bedside. Door is closed. 7840 Reported off to [facility], RN and completed walking rounds.

## 2020-03-04 NOTE — PROGRESS NOTES
Bereavement Support:    Bereavement Coordinator/scarlett Camarena made supportive visit with family members and family  after patient death this day. Visit made in response to referral from [address] [place] [address]. Support provided to family and  in patient room through ministry of presence, listening, and words of encouragement. BC/CH encouraged family to utilize [facility] bereavement services, and made plans for a follow-up phone call in 1-2 weeks.

## 2020-03-04 NOTE — PROGRESS NOTES
Report received from 2900 [facility] RN visual identification made, assumed care of pt. Pt resting quietly with eyes closed, no agitation or restlessness, no grimacing or groaning. Pt respirations 42/minute. Tab alert in place, rails up x 2, bed in lowest position, safety maintained. FLACC 0. Son, Aleja Whipple at bedside. Discussed the elevated respirations. He states they have been like this since the stroke and as long as he is comfortable, he does not need to be medicated. Pt has ng to left nare to low intermittent wall suction. 9558 Physical assessment completed. Respirations 42/minute and unvarying. Heart sounds rapid, NG to low intermittent wall suction with brown drainage in tubing, chambers draining clear yellow urine. Flushed iv. Pt diaphoretic to forehead and arms, wiped with dry washcloth, lower extremities, cool, mottling to knees, no palpable pedal pulses. Upper extremities warm and diaphoretic. pupils non responsive to light Informed son, Aleja Whipple, about assessment findings. 0813 administered dexamethasone IVP and phenobarb sub q. Pt did not respond. Delfina 62 at bedside, speaking with pt wife about assessment findings. 63 wife, at bedside, states she needs to talk to the     12 pt resting quietly , head of bed elevated    1200 going from respirations at 42 and now having periods of apnea lasting 10 seconds or more, wife at bedside with two others. They are calling chaplain Azael Cochran at bedside    1209 pt non responsive, stopped breathing, no apical pulse, wife Diamond, at bedside weeping over pt body. Rachna King, at bedside.

## 2020-03-04 NOTE — [facility] IDG SOCIAL WORKER NOTES
Patient: Isi Estrada    Date: 03/04/20  Time: 8:32 AM    [facility]  Notes  LMSW has read and agrees with the RN initial assessment. LMSW will meet with pt and family to complete the SW assessment. Pt will receive volunteer support in the form of comfort bags, pet therapy, and visits for emotional support.         Signed by: Jessica Espinoza

## 2020-03-04 NOTE — HSPC IDG CHAPLAIN NOTES
Patient: Mindy Reid    Date: 03/04/20  Time: 9:05 AM    [facility]  Notes  / Grief Counselor has reviewed the initial comprehensive assessment and plan of care.  Grief counselor has met with family and will continue to provide support.          Signed by: Yoselin Solomon

## 2020-03-04 NOTE — PROGRESS NOTES
Problem: Anxiety/Agitation  Goal: Verbalize and demonstrate ability to manage anxiety  Description  The patient/family/caregiver will verbalize and demonstrate ability to manage the patient's anxiety throughout hospice care. Scheduled ativan 1 mg IV q 8 hours   Outcome: Progressing Towards Goal     Problem: Pain  Goal: *Control of Pain  Description  Everette Rehman will have pain control AEB a FLACC score of 2 or less.   Outcome: Progressing Towards Goal

## 2020-03-04 NOTE — PROGRESS NOTES
Demographics     Information provided by: Spouse and niece brittani        Name:  Davey Velez                                                                  Level of Care  GIP [x] Routine  [] Respite   []           From the in home program Yes [] No [x]  Team                                                        Diagnosis Seq of stroke       Insurance    Medicare [x]   Medicaid  []  [employer]  []      Other []              Social    [x]   Single []     []    []    Spouse name Diamond   Length of marriage 22 1/2 years   Second marriage for both Children: He has 2 children from a previous marriage and she has 1 son he adopted at the age of 5. Carmelo Langley and Ron Gregory live in [place] live in [address] [place] [address] in hetras Used in the Home prior to admission Yes []  No [x]      Financial Concerns :     No concerns               Delfina Application Needed   Yes []  No [x]     Medicaid application needed Yes []  No [x]                                   IFA Form Complete  Yes [x]   No []    Discharge Plans:   Family said they would take him home with family if his condition improves. He is currently having symptoms that need to be managed. Work History :  Retired [x] [employer] [] Part-time [] Disabled []        [address]   Yes [x]  No []  Branch   [employer] []   [place] [x] [employer] [] [facility] []  [email or web address] []  [employer] []  Linked to [place]   Yes [x]  No []  Referral made to [facility] Yes [] No [x]  He gets a  Small VA disability pension for hearing loss      Advanced Directives Scanned in the system     Living Will  Yes  []  No [x]   HCPOA     Yes  []  No [x]   DPOA        Yes  []  No [x]  DNR           Yes [x]  No []       Spiritual / Bernadine Cristobal Support:  [employer] in [place] in [place]  They moved her 6 months ago         Final Arrangements: Family is doing cremation.   They have not decided on a provider yet cremation resources provided to the spouse . Medical History and/or Narrative copied from the patients chart from the Admitting Physician or Rn:  Patient is a 47-year-old male admitted from [facility] to [facility]. Patient is admitted GIP level of care with admitting diagnosis of sequelae of hemorrhagic stroke with agitation and pain symptoms to be managed. Patient was admitted to the hospital with reports of RUL tremor, R gaze deviation, and inability to follow commands. ER work-up showed large right frontal intraparenchymal hemorrhage. Patient has a history of stroke, vascular dementia with behavioral disturbances, seizure disorder, refractory nausea and vomiting, and is a prostate cancer survivor. On admission, pt noted to be non-verbal.  Eyes open but does not track or respond to any stimuli. Pt with NG tube and hooked up to low intermittent suction per orders. Patient has a chambers catheter and two peripheral IV's that are patent. Patient's skin is intact. On admission, patient bathed by nurse and CNA. Pt noted to be restless. Lorazepam 1 mg IV given for agitation/anxiety per orders. Pt showed no other signs of distress on admission. No s/sx of pain observed. Patient's son arrived @ [address] [place] [address]. Wife to come in the morning. Oriented son to room and facility and questions answered. Pt's son is very emotional with many questions. Answered all questions and informed son we are always here to answer any questions/concerns family may have. Son also states pt's wife is very emotional and tearful. States they welcome angelina support. Admission complete and initial general hospice care plan initiated which includes spiritual, psychosocial, and bereavement. Immediate needs recognized for symptom management. IDG team, including MD, made aware of plan of care and immediate needs. Family is aware of volunteer services.       Mental Health History  No MH issues         Volunteer discussion: Yes [x]    No []      Goals of care for the patient and family: Comfort for the pt. Coping and Bereavement:   Spouse is tearful but appropriate. She is concerned with all of the paperwork she is going to have to do after his death. She owns 2 homes on e here in [place] and one in [facility]. Her important papers are still packed from the move and has no idea where things are.                              Was a Referral made to Bereavement  Yes [] No [x]

## 2020-03-04 NOTE — [facility] IDG SOCIAL WORKER NOTES
Patient: Davey Velez    Date: 03/04/20  Time: 8:44 AM    [facility]  Notes  LMSW has read and agrees with the RN initial assessment. LMSW will meet with pt and family to complete the SW assessment. Pt will receive volunteer support in the form of comfort bags, pet therapy, and visits for emotional support.         Signed by: Melissa Altamirano

## 2020-03-05 NOTE — PROGRESS NOTES
was present with patient and family prior to and following patient's death. Wife was very tearful during patient's transition and following his death. She lay over his body as he was passing and afterward she lay beside him in the bed. Their son Irving Arriaga was present. A couple  Related to the family stayed throughout the passing and took [facility] home afterward.  did a referral to Dilan Jarrell, In Home Bereavement Support and BR Coordinator. Pavithra Soriano felt given the grief reaction of the wife it would be helpful for Sosa Gayle to meet her in person. Azucena followed up making the connection with family. She will continue to follow them throughout the next 13 months.
